# Patient Record
Sex: FEMALE | Race: WHITE | NOT HISPANIC OR LATINO | Employment: OTHER | URBAN - METROPOLITAN AREA
[De-identification: names, ages, dates, MRNs, and addresses within clinical notes are randomized per-mention and may not be internally consistent; named-entity substitution may affect disease eponyms.]

---

## 2022-06-17 ENCOUNTER — ANESTHESIA EVENT (OUTPATIENT)
Dept: EMERGENCY MEDICINE | Facility: HOSPITAL | Age: 57
DRG: 208 | End: 2022-06-17
Payer: MEDICARE

## 2022-06-17 ENCOUNTER — HOSPITAL ENCOUNTER (INPATIENT)
Facility: HOSPITAL | Age: 57
LOS: 8 days | Discharge: HOSPICE | DRG: 208 | End: 2022-06-25
Attending: EMERGENCY MEDICINE | Admitting: FAMILY MEDICINE
Payer: MEDICARE

## 2022-06-17 ENCOUNTER — ANESTHESIA (OUTPATIENT)
Dept: EMERGENCY MEDICINE | Facility: HOSPITAL | Age: 57
DRG: 208 | End: 2022-06-17
Payer: MEDICARE

## 2022-06-17 DIAGNOSIS — R06.00 DYSPNEA: ICD-10-CM

## 2022-06-17 DIAGNOSIS — J43.8 OTHER EMPHYSEMA: Chronic | ICD-10-CM

## 2022-06-17 DIAGNOSIS — R65.21 SEPTIC SHOCK: ICD-10-CM

## 2022-06-17 DIAGNOSIS — J18.9 PNEUMONIA OF RIGHT LUNG DUE TO INFECTIOUS ORGANISM, UNSPECIFIED PART OF LUNG: ICD-10-CM

## 2022-06-17 DIAGNOSIS — I47.20 VT (VENTRICULAR TACHYCARDIA): ICD-10-CM

## 2022-06-17 DIAGNOSIS — I46.9 CARDIAC ARREST: ICD-10-CM

## 2022-06-17 DIAGNOSIS — R07.9 CHEST PAIN: ICD-10-CM

## 2022-06-17 DIAGNOSIS — G47.33 OBSTRUCTIVE SLEEP APNEA SYNDROME: ICD-10-CM

## 2022-06-17 DIAGNOSIS — A41.9 SEPTIC SHOCK: ICD-10-CM

## 2022-06-17 DIAGNOSIS — I47.21: ICD-10-CM

## 2022-06-17 DIAGNOSIS — J96.02 ACUTE HYPERCAPNIC RESPIRATORY FAILURE: Primary | ICD-10-CM

## 2022-06-17 DIAGNOSIS — Z97.8 ENDOTRACHEALLY INTUBATED: ICD-10-CM

## 2022-06-17 DIAGNOSIS — J96.10 CHRONIC RESPIRATORY FAILURE: ICD-10-CM

## 2022-06-17 DIAGNOSIS — I47.20 VENTRICULAR TACHYCARDIA: ICD-10-CM

## 2022-06-17 DIAGNOSIS — E43 SEVERE MALNUTRITION: Chronic | ICD-10-CM

## 2022-06-17 DIAGNOSIS — J44.1 COPD EXACERBATION: ICD-10-CM

## 2022-06-17 DIAGNOSIS — R06.02 SOB (SHORTNESS OF BREATH): ICD-10-CM

## 2022-06-17 DIAGNOSIS — J44.0 CHRONIC OBSTRUCTIVE PULMONARY DISEASE WITH ACUTE LOWER RESPIRATORY INFECTION: Chronic | ICD-10-CM

## 2022-06-17 DIAGNOSIS — J96.10 CHRONIC RESPIRATORY FAILURE, UNSPECIFIED WHETHER WITH HYPOXIA OR HYPERCAPNIA: ICD-10-CM

## 2022-06-17 PROBLEM — R10.9 ABDOMINAL PAIN: Status: ACTIVE | Noted: 2022-03-07

## 2022-06-17 PROBLEM — F17.200 SMOKER: Status: ACTIVE | Noted: 2022-05-23

## 2022-06-17 PROBLEM — J43.2 CENTRIACINAR EMPHYSEMA: Status: ACTIVE | Noted: 2022-06-17

## 2022-06-17 LAB
ALBUMIN SERPL BCP-MCNC: 3.6 G/DL (ref 3.5–5.2)
ALP SERPL-CCNC: 52 U/L (ref 55–135)
ALT SERPL W/O P-5'-P-CCNC: 10 U/L (ref 10–44)
ANION GAP SERPL CALC-SCNC: 6 MMOL/L (ref 8–16)
AST SERPL-CCNC: 15 U/L (ref 10–40)
BASOPHILS # BLD AUTO: 0.03 K/UL (ref 0–0.2)
BASOPHILS NFR BLD: 0.2 % (ref 0–1.9)
BILIRUB SERPL-MCNC: 1 MG/DL (ref 0.1–1)
BNP SERPL-MCNC: 60 PG/ML (ref 0–99)
BUN SERPL-MCNC: 10 MG/DL (ref 6–20)
CALCIUM SERPL-MCNC: 8.5 MG/DL (ref 8.7–10.5)
CHLORIDE SERPL-SCNC: 80 MMOL/L (ref 95–110)
CO2 SERPL-SCNC: 48 MMOL/L (ref 23–29)
CREAT SERPL-MCNC: 0.3 MG/DL (ref 0.5–1.4)
DIFFERENTIAL METHOD: ABNORMAL
EOSINOPHIL # BLD AUTO: 0 K/UL (ref 0–0.5)
EOSINOPHIL NFR BLD: 0.2 % (ref 0–8)
ERYTHROCYTE [DISTWIDTH] IN BLOOD BY AUTOMATED COUNT: 15.4 % (ref 11.5–14.5)
EST. GFR  (AFRICAN AMERICAN): >60 ML/MIN/1.73 M^2
EST. GFR  (NON AFRICAN AMERICAN): >60 ML/MIN/1.73 M^2
GLUCOSE SERPL-MCNC: 134 MG/DL (ref 70–110)
GLUCOSE SERPL-MCNC: 82 MG/DL (ref 70–110)
GLUCOSE SERPL-MCNC: 86 MG/DL (ref 70–110)
HCT VFR BLD AUTO: 37.2 % (ref 37–48.5)
HGB BLD-MCNC: 11 G/DL (ref 12–16)
IMM GRANULOCYTES # BLD AUTO: 0.1 K/UL (ref 0–0.04)
IMM GRANULOCYTES NFR BLD AUTO: 0.6 % (ref 0–0.5)
INFLUENZA A, MOLECULAR: NEGATIVE
INFLUENZA B, MOLECULAR: NEGATIVE
LACTATE SERPL-SCNC: 0.8 MMOL/L (ref 0.5–1.9)
LYMPHOCYTES # BLD AUTO: 0.8 K/UL (ref 1–4.8)
LYMPHOCYTES NFR BLD: 4.4 % (ref 18–48)
MAGNESIUM SERPL-MCNC: 1.6 MG/DL (ref 1.6–2.6)
MCH RBC QN AUTO: 28.4 PG (ref 27–31)
MCHC RBC AUTO-ENTMCNC: 29.6 G/DL (ref 32–36)
MCV RBC AUTO: 96 FL (ref 82–98)
MONOCYTES # BLD AUTO: 0.6 K/UL (ref 0.3–1)
MONOCYTES NFR BLD: 3.6 % (ref 4–15)
NEUTROPHILS # BLD AUTO: 16.4 K/UL (ref 1.8–7.7)
NEUTROPHILS NFR BLD: 91 % (ref 38–73)
NRBC BLD-RTO: 0 /100 WBC
PLATELET # BLD AUTO: 210 K/UL (ref 150–450)
PMV BLD AUTO: 8.4 FL (ref 9.2–12.9)
POTASSIUM SERPL-SCNC: 4.3 MMOL/L (ref 3.5–5.1)
PROCALCITONIN SERPL IA-MCNC: 0.05 NG/ML (ref 0–0.5)
PROT SERPL-MCNC: 6.9 G/DL (ref 6–8.4)
RBC # BLD AUTO: 3.88 M/UL (ref 4–5.4)
SARS-COV-2 RDRP RESP QL NAA+PROBE: NEGATIVE
SODIUM SERPL-SCNC: 134 MMOL/L (ref 136–145)
SPECIMEN SOURCE: NORMAL
TROPONIN I SERPL DL<=0.01 NG/ML-MCNC: <0.03 NG/ML
WBC # BLD AUTO: 17.99 K/UL (ref 3.9–12.7)

## 2022-06-17 PROCEDURE — 87040 BLOOD CULTURE FOR BACTERIA: CPT | Performed by: EMERGENCY MEDICINE

## 2022-06-17 PROCEDURE — C1751 CATH, INF, PER/CENT/MIDLINE: HCPCS

## 2022-06-17 PROCEDURE — 25000242 PHARM REV CODE 250 ALT 637 W/ HCPCS: Performed by: EMERGENCY MEDICINE

## 2022-06-17 PROCEDURE — 83880 ASSAY OF NATRIURETIC PEPTIDE: CPT | Performed by: EMERGENCY MEDICINE

## 2022-06-17 PROCEDURE — 93005 ELECTROCARDIOGRAM TRACING: CPT | Performed by: SPECIALIST

## 2022-06-17 PROCEDURE — 99900031 HC PATIENT EDUCATION (STAT)

## 2022-06-17 PROCEDURE — 93010 EKG 12-LEAD: ICD-10-PCS | Mod: ,,, | Performed by: SPECIALIST

## 2022-06-17 PROCEDURE — 25000003 PHARM REV CODE 250: Performed by: EMERGENCY MEDICINE

## 2022-06-17 PROCEDURE — 99900035 HC TECH TIME PER 15 MIN (STAT)

## 2022-06-17 PROCEDURE — 36600 WITHDRAWAL OF ARTERIAL BLOOD: CPT

## 2022-06-17 PROCEDURE — 82962 GLUCOSE BLOOD TEST: CPT

## 2022-06-17 PROCEDURE — 94002 VENT MGMT INPAT INIT DAY: CPT

## 2022-06-17 PROCEDURE — 82803 BLOOD GASES ANY COMBINATION: CPT

## 2022-06-17 PROCEDURE — 96361 HYDRATE IV INFUSION ADD-ON: CPT

## 2022-06-17 PROCEDURE — 36415 COLL VENOUS BLD VENIPUNCTURE: CPT | Performed by: EMERGENCY MEDICINE

## 2022-06-17 PROCEDURE — 93010 ELECTROCARDIOGRAM REPORT: CPT | Mod: ,,, | Performed by: SPECIALIST

## 2022-06-17 PROCEDURE — 96365 THER/PROPH/DIAG IV INF INIT: CPT

## 2022-06-17 PROCEDURE — 25500020 PHARM REV CODE 255: Performed by: EMERGENCY MEDICINE

## 2022-06-17 PROCEDURE — 63600175 PHARM REV CODE 636 W HCPCS: Performed by: FAMILY MEDICINE

## 2022-06-17 PROCEDURE — 36620 INSERTION CATHETER ARTERY: CPT | Performed by: ANESTHESIOLOGY

## 2022-06-17 PROCEDURE — 94761 N-INVAS EAR/PLS OXIMETRY MLT: CPT

## 2022-06-17 PROCEDURE — 36556 INSERT NON-TUNNEL CV CATH: CPT

## 2022-06-17 PROCEDURE — 92950 HEART/LUNG RESUSCITATION CPR: CPT | Mod: 59

## 2022-06-17 PROCEDURE — 84145 PROCALCITONIN (PCT): CPT | Performed by: EMERGENCY MEDICINE

## 2022-06-17 PROCEDURE — 31500 INSERT EMERGENCY AIRWAY: CPT

## 2022-06-17 PROCEDURE — U0002 COVID-19 LAB TEST NON-CDC: HCPCS | Performed by: EMERGENCY MEDICINE

## 2022-06-17 PROCEDURE — 25000003 PHARM REV CODE 250: Performed by: FAMILY MEDICINE

## 2022-06-17 PROCEDURE — 99291 CRITICAL CARE FIRST HOUR: CPT

## 2022-06-17 PROCEDURE — 80053 COMPREHEN METABOLIC PANEL: CPT | Performed by: EMERGENCY MEDICINE

## 2022-06-17 PROCEDURE — 51702 INSERT TEMP BLADDER CATH: CPT

## 2022-06-17 PROCEDURE — 96367 TX/PROPH/DG ADDL SEQ IV INF: CPT

## 2022-06-17 PROCEDURE — 84484 ASSAY OF TROPONIN QUANT: CPT | Performed by: EMERGENCY MEDICINE

## 2022-06-17 PROCEDURE — 94644 CONT INHLJ TX 1ST HOUR: CPT

## 2022-06-17 PROCEDURE — 83735 ASSAY OF MAGNESIUM: CPT | Performed by: EMERGENCY MEDICINE

## 2022-06-17 PROCEDURE — 63600175 PHARM REV CODE 636 W HCPCS: Performed by: EMERGENCY MEDICINE

## 2022-06-17 PROCEDURE — 83605 ASSAY OF LACTIC ACID: CPT | Performed by: EMERGENCY MEDICINE

## 2022-06-17 PROCEDURE — 96376 TX/PRO/DX INJ SAME DRUG ADON: CPT

## 2022-06-17 PROCEDURE — 87502 INFLUENZA DNA AMP PROBE: CPT | Performed by: EMERGENCY MEDICINE

## 2022-06-17 PROCEDURE — 27000221 HC OXYGEN, UP TO 24 HOURS

## 2022-06-17 PROCEDURE — 96375 TX/PRO/DX INJ NEW DRUG ADDON: CPT

## 2022-06-17 PROCEDURE — 85025 COMPLETE CBC W/AUTO DIFF WBC: CPT | Performed by: EMERGENCY MEDICINE

## 2022-06-17 PROCEDURE — 96368 THER/DIAG CONCURRENT INF: CPT

## 2022-06-17 PROCEDURE — 20000000 HC ICU ROOM

## 2022-06-17 PROCEDURE — 96366 THER/PROPH/DIAG IV INF ADDON: CPT

## 2022-06-17 RX ORDER — LORAZEPAM 2 MG/ML
2 INJECTION INTRAMUSCULAR
Status: DISPENSED | OUTPATIENT
Start: 2022-06-17 | End: 2022-06-18

## 2022-06-17 RX ORDER — ALBUTEROL SULFATE 0.63 MG/3ML
0.63 SOLUTION RESPIRATORY (INHALATION) EVERY 6 HOURS PRN
COMMUNITY

## 2022-06-17 RX ORDER — FENTANYL CITRATE-0.9 % NACL/PF 10 MCG/ML
0-250 PLASTIC BAG, INJECTION (ML) INTRAVENOUS CONTINUOUS
Status: DISCONTINUED | OUTPATIENT
Start: 2022-06-17 | End: 2022-06-19

## 2022-06-17 RX ORDER — LATANOPROST 50 UG/ML
1 SOLUTION/ DROPS OPHTHALMIC NIGHTLY
Status: DISCONTINUED | OUTPATIENT
Start: 2022-06-17 | End: 2022-06-25 | Stop reason: HOSPADM

## 2022-06-17 RX ORDER — GLUCAGON 1 MG
1 KIT INJECTION
Status: DISCONTINUED | OUTPATIENT
Start: 2022-06-17 | End: 2022-06-25 | Stop reason: HOSPADM

## 2022-06-17 RX ORDER — MAGNESIUM SULFATE HEPTAHYDRATE 500 MG/ML
INJECTION, SOLUTION INTRAMUSCULAR; INTRAVENOUS CODE/TRAUMA/SEDATION MEDICATION
Status: COMPLETED | OUTPATIENT
Start: 2022-06-17 | End: 2022-06-17

## 2022-06-17 RX ORDER — IPRATROPIUM BROMIDE AND ALBUTEROL SULFATE 2.5; .5 MG/3ML; MG/3ML
3 SOLUTION RESPIRATORY (INHALATION)
Status: DISCONTINUED | OUTPATIENT
Start: 2022-06-18 | End: 2022-06-18

## 2022-06-17 RX ORDER — ALPRAZOLAM 0.25 MG/1
0.25 TABLET ORAL 3 TIMES DAILY
Status: DISCONTINUED | OUTPATIENT
Start: 2022-06-18 | End: 2022-06-18

## 2022-06-17 RX ORDER — SODIUM BICARBONATE 1 MEQ/ML
SYRINGE (ML) INTRAVENOUS CODE/TRAUMA/SEDATION MEDICATION
Status: COMPLETED | OUTPATIENT
Start: 2022-06-17 | End: 2022-06-17

## 2022-06-17 RX ORDER — LANOLIN ALCOHOL/MO/W.PET/CERES
800 CREAM (GRAM) TOPICAL
Status: DISCONTINUED | OUTPATIENT
Start: 2022-06-17 | End: 2022-06-25 | Stop reason: HOSPADM

## 2022-06-17 RX ORDER — PANTOPRAZOLE SODIUM 40 MG/1
40 TABLET, DELAYED RELEASE ORAL DAILY
COMMUNITY

## 2022-06-17 RX ORDER — AZITHROMYCIN 250 MG/1
500 TABLET, FILM COATED ORAL DAILY
COMMUNITY

## 2022-06-17 RX ORDER — MORPHINE SULFATE 2 MG/ML
2 INJECTION, SOLUTION INTRAMUSCULAR; INTRAVENOUS EVERY 4 HOURS PRN
Status: DISCONTINUED | OUTPATIENT
Start: 2022-06-17 | End: 2022-06-19

## 2022-06-17 RX ORDER — LIDOCAINE HYDROCHLORIDE 10 MG/ML
5 INJECTION, SOLUTION EPIDURAL; INFILTRATION; INTRACAUDAL; PERINEURAL
Status: COMPLETED | OUTPATIENT
Start: 2022-06-17 | End: 2022-06-17

## 2022-06-17 RX ORDER — IBUPROFEN 200 MG
16 TABLET ORAL
Status: DISCONTINUED | OUTPATIENT
Start: 2022-06-17 | End: 2022-06-25 | Stop reason: HOSPADM

## 2022-06-17 RX ORDER — SODIUM CHLORIDE 0.9 % (FLUSH) 0.9 %
10 SYRINGE (ML) INJECTION EVERY 12 HOURS PRN
Status: DISCONTINUED | OUTPATIENT
Start: 2022-06-17 | End: 2022-06-25 | Stop reason: HOSPADM

## 2022-06-17 RX ORDER — PROPOFOL 10 MG/ML
0-50 INJECTION, EMULSION INTRAVENOUS CONTINUOUS
Status: DISCONTINUED | OUTPATIENT
Start: 2022-06-17 | End: 2022-06-18

## 2022-06-17 RX ORDER — GUAIFENESIN 600 MG/1
1200 TABLET, EXTENDED RELEASE ORAL DAILY
COMMUNITY

## 2022-06-17 RX ORDER — IBUPROFEN 200 MG
24 TABLET ORAL
Status: DISCONTINUED | OUTPATIENT
Start: 2022-06-17 | End: 2022-06-25 | Stop reason: HOSPADM

## 2022-06-17 RX ORDER — NOREPINEPHRINE BITARTRATE/D5W 4MG/250ML
0-3 PLASTIC BAG, INJECTION (ML) INTRAVENOUS CONTINUOUS
Status: DISCONTINUED | OUTPATIENT
Start: 2022-06-17 | End: 2022-06-18

## 2022-06-17 RX ORDER — POLYETHYLENE GLYCOL 3350 17 G/17G
17 POWDER, FOR SOLUTION ORAL 2 TIMES DAILY PRN
Status: DISCONTINUED | OUTPATIENT
Start: 2022-06-17 | End: 2022-06-25 | Stop reason: HOSPADM

## 2022-06-17 RX ORDER — AMIODARONE HYDROCHLORIDE 150 MG/3ML
INJECTION, SOLUTION INTRAVENOUS CODE/TRAUMA/SEDATION MEDICATION
Status: COMPLETED | OUTPATIENT
Start: 2022-06-17 | End: 2022-06-17

## 2022-06-17 RX ORDER — ALBUTEROL SULFATE 0.83 MG/ML
7.5 SOLUTION RESPIRATORY (INHALATION)
Status: COMPLETED | OUTPATIENT
Start: 2022-06-17 | End: 2022-06-17

## 2022-06-17 RX ORDER — EPINEPHRINE 0.1 MG/ML
INJECTION INTRAVENOUS CODE/TRAUMA/SEDATION MEDICATION
Status: COMPLETED | OUTPATIENT
Start: 2022-06-17 | End: 2022-06-17

## 2022-06-17 RX ORDER — LATANOPROST/PF 0.005 %
DROPS OPHTHALMIC (EYE)
COMMUNITY

## 2022-06-17 RX ORDER — MORPHINE SULFATE 4 MG/ML
4 INJECTION, SOLUTION INTRAMUSCULAR; INTRAVENOUS ONCE
Status: COMPLETED | OUTPATIENT
Start: 2022-06-17 | End: 2022-06-17

## 2022-06-17 RX ORDER — ONDANSETRON 2 MG/ML
4 INJECTION INTRAMUSCULAR; INTRAVENOUS EVERY 12 HOURS PRN
Status: DISCONTINUED | OUTPATIENT
Start: 2022-06-17 | End: 2022-06-19

## 2022-06-17 RX ORDER — IBUPROFEN 200 MG
1 TABLET ORAL
Status: DISCONTINUED | OUTPATIENT
Start: 2022-06-17 | End: 2022-06-18

## 2022-06-17 RX ORDER — METHYLPREDNISOLONE SOD SUCC 125 MG
125 VIAL (EA) INJECTION
Status: COMPLETED | OUTPATIENT
Start: 2022-06-17 | End: 2022-06-17

## 2022-06-17 RX ORDER — MORPHINE SULFATE 4 MG/ML
4 INJECTION, SOLUTION INTRAMUSCULAR; INTRAVENOUS EVERY 4 HOURS PRN
Status: DISCONTINUED | OUTPATIENT
Start: 2022-06-17 | End: 2022-06-19

## 2022-06-17 RX ORDER — MEROPENEM AND SODIUM CHLORIDE 1 G/50ML
1 INJECTION, SOLUTION INTRAVENOUS
Status: DISCONTINUED | OUTPATIENT
Start: 2022-06-18 | End: 2022-06-23

## 2022-06-17 RX ORDER — ONDANSETRON 2 MG/ML
4 INJECTION INTRAMUSCULAR; INTRAVENOUS
Status: COMPLETED | OUTPATIENT
Start: 2022-06-17 | End: 2022-06-17

## 2022-06-17 RX ORDER — FLUTICASONE FUROATE 200 UG/1
200 POWDER RESPIRATORY (INHALATION)
COMMUNITY

## 2022-06-17 RX ORDER — ACETAMINOPHEN 500 MG
500 TABLET ORAL EVERY 6 HOURS PRN
Status: ON HOLD | COMMUNITY
End: 2022-06-25 | Stop reason: HOSPADM

## 2022-06-17 RX ORDER — SODIUM CHLORIDE, SODIUM LACTATE, POTASSIUM CHLORIDE, CALCIUM CHLORIDE 600; 310; 30; 20 MG/100ML; MG/100ML; MG/100ML; MG/100ML
INJECTION, SOLUTION INTRAVENOUS CONTINUOUS
Status: DISCONTINUED | OUTPATIENT
Start: 2022-06-17 | End: 2022-06-18

## 2022-06-17 RX ORDER — MAGNESIUM SULFATE HEPTAHYDRATE 40 MG/ML
2 INJECTION, SOLUTION INTRAVENOUS ONCE
Status: COMPLETED | OUTPATIENT
Start: 2022-06-17 | End: 2022-06-17

## 2022-06-17 RX ORDER — ENOXAPARIN SODIUM 100 MG/ML
40 INJECTION SUBCUTANEOUS EVERY 24 HOURS
Status: DISCONTINUED | OUTPATIENT
Start: 2022-06-17 | End: 2022-06-25 | Stop reason: HOSPADM

## 2022-06-17 RX ORDER — IBUPROFEN 200 MG
1 TABLET ORAL
COMMUNITY

## 2022-06-17 RX ORDER — NEOMYCIN/POLYMYXIN B/HYDROCORT 3.5-10K-1
1 SUSPENSION, DROPS(FINAL DOSAGE FORM)(ML) OPHTHALMIC (EYE) EVERY 4 HOURS
COMMUNITY

## 2022-06-17 RX ORDER — ACETAMINOPHEN 325 MG/1
650 TABLET ORAL EVERY 8 HOURS PRN
Status: DISCONTINUED | OUTPATIENT
Start: 2022-06-17 | End: 2022-06-25 | Stop reason: HOSPADM

## 2022-06-17 RX ORDER — NALOXONE HCL 0.4 MG/ML
0.02 VIAL (ML) INJECTION
Status: DISCONTINUED | OUTPATIENT
Start: 2022-06-17 | End: 2022-06-25 | Stop reason: HOSPADM

## 2022-06-17 RX ORDER — MEROPENEM AND SODIUM CHLORIDE 1 G/50ML
1 INJECTION, SOLUTION INTRAVENOUS
Status: DISCONTINUED | OUTPATIENT
Start: 2022-06-17 | End: 2022-06-17

## 2022-06-17 RX ORDER — SODIUM CHLORIDE 0.9 % (FLUSH) 0.9 %
3 SYRINGE (ML) INJECTION
Status: DISCONTINUED | OUTPATIENT
Start: 2022-06-17 | End: 2022-06-25 | Stop reason: HOSPADM

## 2022-06-17 RX ORDER — GUAIFENESIN 600 MG/1
1200 TABLET, EXTENDED RELEASE ORAL DAILY
Status: DISCONTINUED | OUTPATIENT
Start: 2022-06-18 | End: 2022-06-25 | Stop reason: HOSPADM

## 2022-06-17 RX ORDER — AZELASTINE 1 MG/ML
1 SPRAY, METERED NASAL 2 TIMES DAILY
COMMUNITY

## 2022-06-17 RX ORDER — PROCHLORPERAZINE EDISYLATE 5 MG/ML
5 INJECTION INTRAMUSCULAR; INTRAVENOUS EVERY 6 HOURS PRN
Status: DISCONTINUED | OUTPATIENT
Start: 2022-06-17 | End: 2022-06-25 | Stop reason: HOSPADM

## 2022-06-17 RX ORDER — FENTANYL CITRATE 50 UG/ML
25 INJECTION, SOLUTION INTRAMUSCULAR; INTRAVENOUS
Status: COMPLETED | OUTPATIENT
Start: 2022-06-17 | End: 2022-06-17

## 2022-06-17 RX ORDER — ALPRAZOLAM 0.25 MG/1
TABLET ORAL 3 TIMES DAILY
COMMUNITY

## 2022-06-17 RX ORDER — IPRATROPIUM BROMIDE 0.5 MG/2.5ML
0.5 SOLUTION RESPIRATORY (INHALATION)
Status: COMPLETED | OUTPATIENT
Start: 2022-06-17 | End: 2022-06-17

## 2022-06-17 RX ADMIN — CEFTRIAXONE 1 G: 1 INJECTION, SOLUTION INTRAVENOUS at 04:06

## 2022-06-17 RX ADMIN — SODIUM CHLORIDE, SODIUM LACTATE, POTASSIUM CHLORIDE, AND CALCIUM CHLORIDE 1000 ML: .6; .31; .03; .02 INJECTION, SOLUTION INTRAVENOUS at 07:06

## 2022-06-17 RX ADMIN — IOHEXOL 100 ML: 350 INJECTION, SOLUTION INTRAVENOUS at 08:06

## 2022-06-17 RX ADMIN — AMIODARONE HYDROCHLORIDE 300 MG: 50 INJECTION, SOLUTION INTRAVENOUS at 05:06

## 2022-06-17 RX ADMIN — MORPHINE SULFATE 4 MG: 4 INJECTION, SOLUTION INTRAMUSCULAR; INTRAVENOUS at 02:06

## 2022-06-17 RX ADMIN — MEROPENEM AND SODIUM CHLORIDE 1 G: 1 INJECTION, SOLUTION INTRAVENOUS at 09:06

## 2022-06-17 RX ADMIN — FENTANYL CITRATE 25 MCG: 50 INJECTION, SOLUTION INTRAMUSCULAR; INTRAVENOUS at 07:06

## 2022-06-17 RX ADMIN — SODIUM CHLORIDE, SODIUM LACTATE, POTASSIUM CHLORIDE, AND CALCIUM CHLORIDE 1000 ML: .6; .31; .03; .02 INJECTION, SOLUTION INTRAVENOUS at 05:06

## 2022-06-17 RX ADMIN — Medication 0.35 MCG/KG/MIN: at 11:06

## 2022-06-17 RX ADMIN — FENTANYL CITRATE 25 MCG/HR: 50 INJECTION INTRAVENOUS at 09:06

## 2022-06-17 RX ADMIN — ALBUTEROL SULFATE 7.5 MG: 2.5 SOLUTION RESPIRATORY (INHALATION) at 11:06

## 2022-06-17 RX ADMIN — Medication 0.01 MCG/KG/MIN: at 06:06

## 2022-06-17 RX ADMIN — SODIUM CHLORIDE, SODIUM LACTATE, POTASSIUM CHLORIDE, AND CALCIUM CHLORIDE 1000 ML: .6; .31; .03; .02 INJECTION, SOLUTION INTRAVENOUS at 11:06

## 2022-06-17 RX ADMIN — LIDOCAINE HYDROCHLORIDE 50 MG: 10 INJECTION, SOLUTION EPIDURAL; INFILTRATION; INTRACAUDAL; PERINEURAL at 07:06

## 2022-06-17 RX ADMIN — SODIUM CHLORIDE, SODIUM LACTATE, POTASSIUM CHLORIDE, AND CALCIUM CHLORIDE: .6; .31; .03; .02 INJECTION, SOLUTION INTRAVENOUS at 11:06

## 2022-06-17 RX ADMIN — MAGNESIUM SULFATE IN WATER 2 G: 40 INJECTION, SOLUTION INTRAVENOUS at 02:06

## 2022-06-17 RX ADMIN — MAGNESIUM SULFATE HEPTAHYDRATE 2 G: 500 INJECTION, SOLUTION INTRAMUSCULAR; INTRAVENOUS at 05:06

## 2022-06-17 RX ADMIN — METHYLPREDNISOLONE SODIUM SUCCINATE 125 MG: 125 INJECTION, POWDER, FOR SOLUTION INTRAMUSCULAR; INTRAVENOUS at 01:06

## 2022-06-17 RX ADMIN — EPINEPHRINE 0.1 MG: 0.1 INJECTION, SOLUTION ENDOTRACHEAL; INTRACARDIAC; INTRAVENOUS at 05:06

## 2022-06-17 RX ADMIN — AZITHROMYCIN 500 MG: 500 INJECTION, POWDER, LYOPHILIZED, FOR SOLUTION INTRAVENOUS at 04:06

## 2022-06-17 RX ADMIN — ONDANSETRON 4 MG: 2 INJECTION INTRAMUSCULAR; INTRAVENOUS at 02:06

## 2022-06-17 RX ADMIN — PROPOFOL 5 MCG/KG/MIN: 10 INJECTION, EMULSION INTRAVENOUS at 04:06

## 2022-06-17 RX ADMIN — SODIUM BICARBONATE 100 MEQ: 84 INJECTION, SOLUTION INTRAVENOUS at 05:06

## 2022-06-17 RX ADMIN — IPRATROPIUM BROMIDE 0.5 MG: 0.5 SOLUTION RESPIRATORY (INHALATION) at 11:06

## 2022-06-17 NOTE — ED PROVIDER NOTES
Encounter Date: 6/17/2022       History     Chief Complaint   Patient presents with    Cough    Shortness of Breath     Cough and SOB x 3 days      56-year-old female past medical history of COPD, malnutrition, sepsis, asthma, who presents emergency department with shortness of breath.  Patient normally wears oxygen 3 L and was reported that her oxygen was low 80s.  Patient has had a cough of occasionally productive sputum.  She has had diarrhea chronically unchanged from baseline.  No fever chills reported.  No one else sick at home with similar symptoms.  No chest pain.  She has chronic abdominal pain from bowel resection from an episode when she severe sepsis.  She is complaining of some upper abdominal pain mild.  Patient has been using her breathing treatments and inhalers at home but still feels short of breath.  Patient recently relocated here from [place].        Review of patient's allergies indicates:   Allergen Reactions    Celexa [citalopram]     Breztri aerosphere [budesonide-glycopyr-formoterol]     Pcn [penicillins]     Shellfish containing products     Advair diskus [fluticasone propion-salmeterol] Rash    Buspirone (bulk) Rash    Chantix [varenicline] Nausea And Vomiting     Past Medical History:   Diagnosis Date    Anemia, unspecified     Asthma     Chronic respiratory failure with hypoxia     COPD (chronic obstructive pulmonary disease)     Dysuria     Hypokalemia     Hypomagnesemia     Hyponatremia     Malnutrition     Sepsis, unspecified organism      Past Surgical History:   Procedure Laterality Date    ABDOMINAL SURGERY      CHOLECYSTECTOMY      COLECTOMY      12/2021    HYSTERECTOMY       History reviewed. No pertinent family history.  Social History     Tobacco Use    Smoking status: Current Every Day Smoker     Packs/day: 0.50    Smokeless tobacco: Never Used   Substance Use Topics    Alcohol use: Not Currently    Drug use: Yes     Types: Marijuana     Comment: x  5 days ago     Review of Systems   Constitutional: Negative for fever.   HENT: Negative for sore throat.    Respiratory: Positive for cough and shortness of breath.    Cardiovascular: Positive for leg swelling. Negative for chest pain.   Gastrointestinal: Positive for diarrhea. Negative for nausea and vomiting.   Genitourinary: Negative for dysuria.   Musculoskeletal: Negative for back pain.   Skin: Negative for rash.   Neurological: Negative for weakness and headaches.   Hematological: Does not bruise/bleed easily.   All other systems reviewed and are negative.      Physical Exam     Initial Vitals [06/17/22 1100]   BP Pulse Resp Temp SpO2   119/82 (!) 118 20 98.7 °F (37.1 °C) 100 %      MAP       --         Physical Exam    Nursing note and vitals reviewed.  Constitutional: No distress.   Frail, cachectic   HENT:   Head: Normocephalic and atraumatic.   Mouth/Throat: No oropharyngeal exudate.   Eyes: Conjunctivae and EOM are normal. Pupils are equal, round, and reactive to light.   Neck: Neck supple. No tracheal deviation present.   Cardiovascular: Regular rhythm, normal heart sounds and intact distal pulses.   No murmur heard.  Tachycardic,   Pulmonary/Chest: No stridor. No respiratory distress. She has wheezes (Diffuse expiratory bilaterally). She has no rhonchi. She has no rales.   Decreased air entry   Abdominal: Abdomen is soft. She exhibits no distension. There is abdominal tenderness ( minimal epigastric). There is no rebound.   Musculoskeletal:         General: No tenderness or edema. Normal range of motion.      Cervical back: Neck supple.     Neurological: She is alert and oriented to person, place, and time. She has normal strength. No cranial nerve deficit or sensory deficit.   Skin: Skin is warm and dry. Capillary refill takes less than 2 seconds. No rash noted. No erythema. No pallor.   Psychiatric: She has a normal mood and affect. Thought content normal.         ED Course   Critical  "Care    Date/Time: 6/17/2022 5:07 PM  Performed by: Shayne Hoffman DO  Authorized by: Shayne Hoffman DO   Direct patient critical care time: 15 minutes  Additional history critical care time: 5 minutes  Ordering / reviewing critical care time: 5 minutes  Documentation critical care time: 5 minutes  Consulting other physicians critical care time: 5 minutes  Total critical care time (exclusive of procedural time) : 35 minutes  Critical care time was exclusive of separately billable procedures and treating other patients.  Critical care was necessary to treat or prevent imminent or life-threatening deterioration of the following conditions: respiratory failure.  Critical care was time spent personally by me on the following activities: blood draw for specimens, discussions with consultants, discussions with primary provider, interpretation of cardiac output measurements, evaluation of patient's response to treatment, examination of patient, obtaining history from patient or surrogate, ordering and performing treatments and interventions, ordering and review of laboratory studies, ordering and review of radiographic studies, review of old charts, transcutaneous pacing, re-evaluation of patient's condition and vascular access procedures.  Subsequent provider of critical care: I assumed direction of critical care for this patient from another provider of my specialty.    Intubation    Date/Time: 6/17/2022 5:09 PM  Location procedure was performed: [facility] EMERGENCY DEPARTMENT  Performed by: Shayne Hoffman DO  Authorized by: Shayne Hoffman DO   Consent Done: Yes  Consent: Verbal consent obtained.  Risks and benefits: risks, benefits and alternatives were discussed  Consent given by: hari  Patient identity confirmed: name  Time out: Immediately prior to procedure a "time out" was called to verify the correct patient, procedure, equipment, support staff and site/side marked as required.  Indications: respiratory failure " and  hypercapnia  Intubation method: video-assisted  Patient status: paralyzed (RSI)  Preoxygenation: BVM  Paralytic: none  Laryngoscope size: Glide 3  Tube size: 7.5 mm  Tube type: cuffed  Number of attempts: 1  Cricoid pressure: no  Cords visualized: yes  Post-procedure assessment: ETCO2 monitor  Breath sounds: wheezing  Cuff inflated: yes  ETT to lip: 23 cm  Tube secured with: ETT christian  Chest x-ray interpreted by me.  Chest x-ray findings: endotracheal tube in appropriate position  Patient tolerance: Patient tolerated the procedure well with no immediate complications  Specimens: No    Central Line    Date/Time: 6/17/2022 7:14 PM  Performed by: Bernadette Campbell MD  Authorized by: Bernadette Campbell MD     Location procedure was performed:  [facility] EMERGENCY DEPARTMENT  Indications:  Hemodynamic monitoring, vascular access and med administration  Anesthesia:  Local infiltration  Local anesthetic:  Lidocaine 1% without epinephrine  Preparation:  Skin prepped with ChloraPrep  Location:  Left internal jugular  Catheter size:  12 Fr  Manometry: No    Number of attempts:  1  Critical Care    Date/Time: 6/17/2022 7:36 PM  Performed by: Bernadette Campbell MD  Authorized by: Juan M Godwin MD   Direct patient critical care time: 20 minutes  Ordering / reviewing critical care time: 5 minutes  Documentation critical care time: 5 minutes  Consulting other physicians critical care time: 5 minutes  Total critical care time (exclusive of procedural time) : 35 minutes        Labs Reviewed   CBC W/ AUTO DIFFERENTIAL - Abnormal; Notable for the following components:       Result Value    WBC 17.99 (*)     RBC 3.88 (*)     Hemoglobin 11.0 (*)     MCHC 29.6 (*)     RDW 15.4 (*)     MPV 8.4 (*)     Immature Granulocytes 0.6 (*)     Gran # (ANC) 16.4 (*)     Immature Grans (Abs) 0.10 (*)     Lymph # 0.8 (*)     Gran % 91.0 (*)     Lymph % 4.4 (*)     Mono % 3.6 (*)     All other components within normal limits   COMPREHENSIVE METABOLIC PANEL - Abnormal;  Notable for the following components:    Sodium 134 (*)     Chloride 80 (*)     CO2 48 (*)     Creatinine 0.3 (*)     Calcium 8.5 (*)     Alkaline Phosphatase 52 (*)     Anion Gap 6 (*)     All other components within normal limits    Narrative:     CO2 critical result(s) repeated. Called and verbal readback obtained   from Kanika Fox RN/ED by SORAIDA 06/17/2022 14:44   CULTURE, BLOOD   CULTURE, BLOOD   LACTIC ACID, PLASMA   TROPONIN I   MAGNESIUM   SARS-COV-2 RNA AMPLIFICATION, QUAL   INFLUENZA A AND B ANTIGEN    Narrative:     Specimen Source->Nasopharyngeal Swab   PROCALCITONIN   B-TYPE NATRIURETIC PEPTIDE   B-TYPE NATRIURETIC PEPTIDE   POCT GLUCOSE     Results for orders placed or performed during the hospital encounter of 06/17/22   CBC auto differential   Result Value Ref Range    WBC 17.99 (H) 3.90 - 12.70 K/uL    RBC 3.88 (L) 4.00 - 5.40 M/uL    Hemoglobin 11.0 (L) 12.0 - 16.0 g/dL    Hematocrit 37.2 37.0 - 48.5 %    MCV 96 82 - 98 fL    MCH 28.4 27.0 - 31.0 pg    MCHC 29.6 (L) 32.0 - 36.0 g/dL    RDW 15.4 (H) 11.5 - 14.5 %    Platelets 210 150 - 450 K/uL    MPV 8.4 (L) 9.2 - 12.9 fL    Immature Granulocytes 0.6 (H) 0.0 - 0.5 %    Gran # (ANC) 16.4 (H) 1.8 - 7.7 K/uL    Immature Grans (Abs) 0.10 (H) 0.00 - 0.04 K/uL    Lymph # 0.8 (L) 1.0 - 4.8 K/uL    Mono # 0.6 0.3 - 1.0 K/uL    Eos # 0.0 0.0 - 0.5 K/uL    Baso # 0.03 0.00 - 0.20 K/uL    nRBC 0 0 /100 WBC    Gran % 91.0 (H) 38.0 - 73.0 %    Lymph % 4.4 (L) 18.0 - 48.0 %    Mono % 3.6 (L) 4.0 - 15.0 %    Eosinophil % 0.2 0.0 - 8.0 %    Basophil % 0.2 0.0 - 1.9 %    Differential Method Automated    Comprehensive metabolic panel   Result Value Ref Range    Sodium 134 (L) 136 - 145 mmol/L    Potassium 4.3 3.5 - 5.1 mmol/L    Chloride 80 (L) 95 - 110 mmol/L    CO2 48 (HH) 23 - 29 mmol/L    Glucose 82 70 - 110 mg/dL    BUN 10 6 - 20 mg/dL    Creatinine 0.3 (L) 0.5 - 1.4 mg/dL    Calcium 8.5 (L) 8.7 - 10.5 mg/dL    Total Protein 6.9 6.0 - 8.4 g/dL    Albumin 3.6 3.5  - 5.2 g/dL    Total Bilirubin 1.0 0.1 - 1.0 mg/dL    Alkaline Phosphatase 52 (L) 55 - 135 U/L    AST 15 10 - 40 U/L    ALT 10 10 - 44 U/L    Anion Gap 6 (L) 8 - 16 mmol/L    eGFR if African American >60.0 >60 mL/min/1.73 m^2    eGFR if non African American >60.0 >60 mL/min/1.73 m^2   Lactic acid, plasma   Result Value Ref Range    Lactate (Lactic Acid) 0.8 0.5 - 1.9 mmol/L   Troponin I   Result Value Ref Range    Troponin I <0.030 <=0.040 ng/mL   Magnesium   Result Value Ref Range    Magnesium 1.6 1.6 - 2.6 mg/dL   COVID-19 Rapid Screening   Result Value Ref Range    SARS-CoV-2 RNA, Amplification, Qual Negative Negative   Influenza antigen Nasopharyngeal Swab   Result Value Ref Range    Influenza A, Molecular Negative Negative    Influenza B, Molecular Negative Negative    Flu A & B Source Nasal swab    Procalcitonin   Result Value Ref Range    Procalcitonin 0.05 0.00 - 0.50 ng/mL   BNP   Result Value Ref Range    BNP 60 0 - 99 pg/mL   POCT glucose   Result Value Ref Range    POC Glucose 86 70 - 110     X-Ray Chest AP Portable   Final Result      X-Ray Chest AP Portable   Final Result      X-Ray Chest 1 View   Final Result      CT Abdomen Pelvis With Contrast    (Results Pending)   CTA Chest Non-Coronary(PE Studies)    (Results Pending)   CT Head Without Contrast    (Results Pending)       EKG Readings: (Independently Interpreted)   Initial Reading: No STEMI. Rhythm: Sinus Tachycardia. Ectopy: Multifocal PVCs. Conduction: Normal.     ECG Results          EKG 12-lead (In process)  Result time 06/17/22 13:26:40    In process by Interface, Lab In [facility] (06/17/22 13:26:40)                 Narrative:    Test Reason : R06.00,    Vent. Rate : 121 BPM     Atrial Rate : 187 BPM     P-R Int : 130 ms          QRS Dur : 080 ms      QT Int : 322 ms       P-R-T Axes : 065 023 075 degrees     QTc Int : 457 ms    Sinus tachycardia with frequent Premature ventricular complexes  Possible Left atrial enlargement  Low voltage  QRS  Borderline Abnormal ECG  No previous ECGs available    Referred By: [email or web address]   SELF           Confirmed By:                             Imaging Results          X-Ray Chest AP Portable (Final result)  Result time 06/17/22 19:06:39    Final result by Isaias Lance MD (06/17/22 19:06:39)                 Narrative:    CLINICAL HISTORY:  56 years (1965) Female Cough; Shortness of Breath    TECHNIQUE:  Portable AP radiograph the chest.    COMPARISON:  Radiograph from June 17, 2022.    FINDINGS:  The lungs are hyperinflated with flattening of the bilateral hemidiaphragms consistent with underlying chronic obstructive lung disease. There is a tiny interstitial opacity in the right upper lobe, unchanged from the previous exam. There is minimal interstitial opacity at the right lung base. No pneumothorax is identified. The heart is normal in size. Atheromatous calcifications are seen at the aortic arch. Osseous structures appear unchanged. The visualized upper abdomen is unremarkable.    Lines and tubes: Interval placement of a right-sided IJ central venous catheter with tip at the level of the SVC. There is an endotracheal tube with tip approximately 4.8; from the shauna. There is an enteric tube with tip below the field of view (sub-diaphragmatic).    IMPRESSION:  Interval placement of a right-sided IJ central venous catheter with tip at the level of the SVC, otherwise unchanged radiograph the chest.                  .            Electronically signed by:  Isaias Lance MD  6/17/2022 7:06 PM CDT Workstation: [id number]                             X-Ray Chest AP Portable (Final result)  Result time 06/17/22 17:58:18   Procedure changed from X-Ray Chest 1 View     Final result by Laurel Arana MD (06/17/22 17:58:18)                 Narrative:    Portable chest x-ray at 5:43 PM is compared to prior study of 11:24 AM    Clinical history is intubation, shortness of breath    There is an endotracheal  tube with the tip approximately four cm above the shauna. There is an NG tube descending into the stomach.    The lungs are hyperexpanded compatible with COPD. There are scattered reticular lung markings in the right upper lobe and infrahilar region. There are no new infiltrates or pleural effusions. There is no pneumothorax.    The cardiomediastinal silhouette is normal in size.    IMPRESSION: Support devices in satisfactory position    Hyperexpanded lungs suggestive of COPD    Stable reticular opacities in the right upper lobe and right infrahilar region suggestive of infectious or inflammatory process    Electronically signed by:  Laurel Arana MD  6/17/2022 5:58 PM CDT Workstation: [id number]                             CT Abdomen Pelvis With Contrast (No Result on File)                X-Ray Chest 1 View (Final result)  Result time 06/17/22 11:31:20    Final result by Isaias Lance MD (06/17/22 11:31:20)                 Narrative:    CLINICAL HISTORY:  56 years (1965) Female Cough; Shortness of Breath (Cough and SOB x 3 days ); Hx of COPD    TECHNIQUE:  Portable AP radiograph the chest.    COMPARISON:  None available.    FINDINGS:  Mild scattered reticular lung markings are seen in the right upper lung zone and right infrahilar lung zone. No pneumothorax is identified. The heart is normal in size. The mediastinum is within normal limits. Osseous structures appear within normal limits. The visualized upper abdomen is unremarkable.    IMPRESSION:  Findings compatible with mild atypical infection/viral pneumonia. Consider radiographic follow-up in 6-8 weeks after treatment to document resolution (as radiographic findings may persist beyond clinical symptoms and underlying malignancy is not excluded).                  .            Electronically signed by:  Isaias Lance MD  6/17/2022 11:31 AM CDT Workstation: [id number]                            X-Rays:   Independently Interpreted Readings:    Other Readings:  Chest x-ray shows normal placement of right IJ catheter    Medications   iohexoL (OMNIPAQUE 350) injection 100 mL (has no administration in time range)   propofol (DIPRIVAN) 10 mg/mL infusion (25 mcg/kg/min × 39 kg Intravenous Rate/Dose Change 6/17/22 1720)   meropenem-0.9% sodium chloride 1 g/50 mL IVPB (has no administration in time range)   lactated ringers bolus 1,000 mL (1,000 mLs Intravenous New Bag 6/17/22 1907)   NORepinephrine 4 mg in dextrose 5% 250 mL infusion (premix) (titrating) (0.005 mcg/kg/min × 39 kg Intravenous New Bag 6/17/22 1833)   lorazepam injection 2 mg (has no administration in time range)   LIDOcaine (PF) 10 mg/ml (1%) injection 50 mg (has no administration in time range)   EPINEPHrine 0.1 mg/mL injection (0.1 mg Intravenous Given 6/17/22 1727)   amiodarone injection (300 mg Intravenous Given 6/17/22 1726)   albuterol nebulizer solution 7.5 mg (7.5 mg Nebulization Given 6/17/22 1130)   ipratropium 0.02 % nebulizer solution 0.5 mg (0.5 mg Nebulization Given 6/17/22 1130)   methylPREDNISolone sodium succinate injection 125 mg (125 mg Intravenous Given 6/17/22 1332)   magnesium sulfate 2g in water 50mL IVPB (premix) (0 g Intravenous Stopped 6/17/22 1642)   morphine injection 4 mg (4 mg Intravenous Given 6/17/22 1436)   ondansetron injection 4 mg (4 mg Intravenous Given 6/17/22 1435)   cefTRIAXone (ROCEPHIN) 1 g/50 mL D5W IVPB (0 g Intravenous Stopped 6/17/22 1648)   azithromycin 500 mg in dextrose 5 % 250 mL IVPB (ready to mix system) (0 mg Intravenous Stopped 6/17/22 1720)   lactated ringers bolus 1,000 mL (0 mLs Intravenous Stopped 6/17/22 1815)     Medical Decision Making:   Differential Diagnosis:   56-year-old female presented emergency department with shortness of breath.  Patient was turned over to me after she was intubated shift change waiting for CT scans and as I was walking into the room nurse called saying patient lost her pulse and CPR immediately started.   Patient had a run of torsades and ventricular tachycardia noted during CPR.  ET tube in place.  Breath sounds diminished bilaterally during the COVID however able to ventilate with Ambu bag.  Patient did respond to medication and pressor started as patient's blood pressure on the lower side.  Patient gradually woke up and now following commands and responding to family members appropriately and moving all extremities.  CT still pending and will be done on the way to the floor.  Hospital Medicine consulted who evaluated patient at bedside.  Anesthesia placed arterial line.  Pulmonologist on-call consulted and will follow-up patient.  Family counseled about patient's condition and poor prognosis  Clinical Tests:   Lab Tests: Reviewed  Radiological Study: Reviewed  Medical Tests: Reviewed             ED Course as of 06/17/22 1935 Fri Jun 17, 2022   1331 EKG 12:39 p.m. sinus tachycardia rate of 121, frequent premature ventricular complexes in a pattern of bigeminy followed by a normal sinus rhythm sinus tachycardia.  No STEMI. [JR]   1526 BMP resulted with CO2 of 48. Because of this I ordered a blood gas.  Blood gas with markedly elevated CO2 pH is 7.18.  Patient likely has acute on chronic hypercapnic respiratory failure.  Will place BiPAP.  Patient is protecting her airway and is still conversant.  [JR]   1631 Lactic acid still pending I am calling lab to figure out why. [JR]   1717 Care turned over to Dr. Campbell []      ED Course User Index  [JR] Shayne Hoffman DO             Clinical Impression:   Final diagnoses:  [R06.00] Dyspnea  [J96.02] Acute hypercapnic respiratory failure (Primary)  [J44.1] COPD exacerbation  [Z97.8] Endotracheally intubated  [R06.02] SOB (shortness of breath)  [I46.9] Cardiac arrest  [A41.9, R65.21] Septic shock          ED Disposition Condition    Admit               Bernadette Campbell MD  06/17/22 1935       Bernadette Campbell MD  06/17/22 1937       Bernadette Campbell MD  07/14/22 [id number]

## 2022-06-17 NOTE — ED TRIAGE NOTES
Sob x worse since last pm.  Stats at home in high 80's, on 2 lpm O2 at home.  EMS upped Oxygen to 4 lpm with better O2 sats.  + rough cough, green in color.  + fever (102.1 at home, Tylenol 1000 mg at 7:30 am w/ relief.).

## 2022-06-18 ENCOUNTER — CLINICAL SUPPORT (OUTPATIENT)
Dept: CARDIOLOGY | Facility: HOSPITAL | Age: 57
DRG: 208 | End: 2022-06-18
Attending: INTERNAL MEDICINE
Payer: MEDICARE

## 2022-06-18 VITALS — HEIGHT: 64 IN | WEIGHT: 104 LBS | BODY MASS INDEX: 17.75 KG/M2

## 2022-06-18 PROBLEM — I47.20 VT (VENTRICULAR TACHYCARDIA): Status: ACTIVE | Noted: 2022-06-18

## 2022-06-18 PROBLEM — F41.1 GAD (GENERALIZED ANXIETY DISORDER): Chronic | Status: ACTIVE | Noted: 2022-06-18

## 2022-06-18 PROBLEM — J44.9 COPD (CHRONIC OBSTRUCTIVE PULMONARY DISEASE): Chronic | Status: ACTIVE | Noted: 2022-06-18

## 2022-06-18 PROBLEM — D64.9 ANEMIA: Chronic | Status: ACTIVE | Noted: 2022-06-18

## 2022-06-18 PROBLEM — E43 SEVERE MALNUTRITION: Chronic | Status: ACTIVE | Noted: 2022-06-18

## 2022-06-18 PROBLEM — D72.829 LEUCOCYTOSIS: Status: ACTIVE | Noted: 2022-06-18

## 2022-06-18 PROBLEM — E87.1 HYPONATREMIA: Status: ACTIVE | Noted: 2022-06-18

## 2022-06-18 LAB
ALLENS TEST: ABNORMAL
ANION GAP SERPL CALC-SCNC: 7 MMOL/L (ref 8–16)
AV INDEX (PROSTH): 0.9
AV MEAN GRADIENT: 2 MMHG
AV VALVE AREA: 4.09 CM2
BASO STIPL BLD QL SMEAR: ABNORMAL
BASOPHILS # BLD AUTO: 0.01 K/UL (ref 0–0.2)
BASOPHILS NFR BLD: 0 % (ref 0–1.9)
BSA FOR ECHO PROCEDURE: 1.46 M2
BUN SERPL-MCNC: 15 MG/DL (ref 6–20)
CA-I BLDV-SCNC: 1 MMOL/L (ref 1.06–1.42)
CALCIUM SERPL-MCNC: 7.8 MG/DL (ref 8.7–10.5)
CHLORIDE SERPL-SCNC: 85 MMOL/L (ref 95–110)
CO2 SERPL-SCNC: 40 MMOL/L (ref 23–29)
CREAT SERPL-MCNC: 0.5 MG/DL (ref 0.5–1.4)
DELSYS: ABNORMAL
DIFFERENTIAL METHOD: ABNORMAL
DOP CALC AO VTI: 14.26 CM
DOP CALC LVOT AREA: 4.6 CM2
DOP CALC LVOT DIAMETER: 2.41 CM
DOP CALC LVOT PEAK VEL: 77 M/S
DOP CALC LVOT STROKE VOLUME: 58.27 CM3
DOP CALCLVOT PEAK VEL VTI: 12.78 CM
E WAVE DECELERATION TIME: 242.9 MSEC
E/A RATIO: 0.63
EJECTION FRACTION: 25 %
EOSINOPHIL # BLD AUTO: 0 K/UL (ref 0–0.5)
EOSINOPHIL NFR BLD: 0 % (ref 0–8)
EP: 8
ERYTHROCYTE [DISTWIDTH] IN BLOOD BY AUTOMATED COUNT: 15.5 % (ref 11.5–14.5)
ERYTHROCYTE [SEDIMENTATION RATE] IN BLOOD BY WESTERGREN METHOD: 25 MM/H
ERYTHROCYTE [SEDIMENTATION RATE] IN BLOOD BY WESTERGREN METHOD: 25 MM/H
ERYTHROCYTE [SEDIMENTATION RATE] IN BLOOD BY WESTERGREN METHOD: 30 MM/H
EST. GFR  (AFRICAN AMERICAN): >60 ML/MIN/1.73 M^2
EST. GFR  (NON AFRICAN AMERICAN): >60 ML/MIN/1.73 M^2
FIO2: 100
FIO2: 30
FIO2: 32
FIO2: 32
FIO2: 40
FIO2: 50
FIO2: 60
FLOW: 3
FLOW: 3
FOLATE SERPL-MCNC: 20.1 NG/ML (ref 4–24)
FRACTIONAL SHORTENING: 36 % (ref 28–44)
GLUCOSE SERPL-MCNC: 101 MG/DL (ref 70–110)
GLUCOSE SERPL-MCNC: 94 MG/DL (ref 70–110)
HCO3 UR-SCNC: 39.3 MMOL/L (ref 24–28)
HCO3 UR-SCNC: 48.2 MMOL/L (ref 24–28)
HCO3 UR-SCNC: 50 MMOL/L (ref 24–28)
HCO3 UR-SCNC: 51.5 MMOL/L (ref 24–28)
HCO3 UR-SCNC: ABNORMAL MMOL/L
HCT VFR BLD AUTO: 30.8 % (ref 37–48.5)
HCT VFR BLD CALC: 32 %PCV (ref 36–54)
HGB BLD-MCNC: 9.5 G/DL (ref 12–16)
HYPOCHROMIA BLD QL SMEAR: ABNORMAL
IMM GRANULOCYTES # BLD AUTO: 0.17 K/UL (ref 0–0.04)
IMM GRANULOCYTES NFR BLD AUTO: 0.7 % (ref 0–0.5)
IP: 22
IVRT: 157.47 MSEC
LEFT INTERNAL DIMENSION IN SYSTOLE: 3.21 CM (ref 2.1–4)
LEFT VENTRICLE DIASTOLIC VOLUME INDEX: 84.7 ML/M2
LEFT VENTRICLE DIASTOLIC VOLUME: 125.36 ML
LEFT VENTRICLE SYSTOLIC VOLUME INDEX: 22.3 ML/M2
LEFT VENTRICLE SYSTOLIC VOLUME: 33.06 ML
LEFT VENTRICULAR INTERNAL DIMENSION IN DIASTOLE: 5 CM (ref 3.5–6)
LYMPHOCYTES # BLD AUTO: 1.3 K/UL (ref 1–4.8)
LYMPHOCYTES NFR BLD: 5.6 % (ref 18–48)
MAGNESIUM SERPL-MCNC: 2.2 MG/DL (ref 1.6–2.6)
MCH RBC QN AUTO: 28.1 PG (ref 27–31)
MCHC RBC AUTO-ENTMCNC: 30.8 G/DL (ref 32–36)
MCV RBC AUTO: 91 FL (ref 82–98)
MODE: ABNORMAL
MONOCYTES # BLD AUTO: 0.7 K/UL (ref 0.3–1)
MONOCYTES NFR BLD: 3.1 % (ref 4–15)
MRSA SCREEN BY PCR: NEGATIVE
MV PEAK A VEL: 1.05 M/S
MV PEAK E VEL: 0.66 M/S
NEUTROPHILS # BLD AUTO: 20.5 K/UL (ref 1.8–7.7)
NEUTROPHILS NFR BLD: 90.6 % (ref 38–73)
NRBC BLD-RTO: 0 /100 WBC
PCO2 BLDA: 50.9 MMHG (ref 35–45)
PCO2 BLDA: 59.9 MMHG (ref 35–45)
PCO2 BLDA: 64.4 MMHG (ref 35–45)
PCO2 BLDA: 85.3 MMHG (ref 35–45)
PCO2 BLDA: >130 MMHG (ref 35–45)
PEEP: 5
PH SMN: 7.12 [PH] (ref 7.35–7.45)
PH SMN: 7.18 [PH] (ref 7.35–7.45)
PH SMN: 7.19 [PH] (ref 7.35–7.45)
PH SMN: 7.36 [PH] (ref 7.35–7.45)
PH SMN: 7.5 [PH] (ref 7.35–7.45)
PH SMN: 7.51 [PH] (ref 7.35–7.45)
PH SMN: 7.53 [PH] (ref 7.35–7.45)
PHOSPHATE SERPL-MCNC: 2.6 MG/DL (ref 2.7–4.5)
PISA TR MAX VEL: 2.32 M/S
PLATELET # BLD AUTO: 216 K/UL (ref 150–450)
PLATELET BLD QL SMEAR: ABNORMAL
PMV BLD AUTO: 8.6 FL (ref 9.2–12.9)
PO2 BLDA: 104 MMHG (ref 80–100)
PO2 BLDA: 112 MMHG (ref 80–100)
PO2 BLDA: 195 MMHG (ref 80–100)
PO2 BLDA: 550 MMHG (ref 80–100)
PO2 BLDA: 79 MMHG (ref 80–100)
PO2 BLDA: 87 MMHG (ref 80–100)
PO2 BLDA: 92 MMHG (ref 80–100)
POC BE: 16 MMOL/L
POC BE: 23 MMOL/L
POC BE: 27 MMOL/L
POC BE: 28 MMOL/L
POC BE: ABNORMAL MMOL/L
POC IONIZED CALCIUM: 1.08 MMOL/L (ref 1.06–1.42)
POC SATURATED O2: 100 % (ref 95–100)
POC SATURATED O2: 100 % (ref 95–100)
POC SATURATED O2: 96 % (ref 95–100)
POC SATURATED O2: 99 % (ref 95–100)
POC SATURATED O2: ABNORMAL %
POC TCO2: 41 MMOL/L (ref 23–27)
POC TCO2: >50 MMOL/L (ref 23–27)
POC TCO2: ABNORMAL MMOL/L
POTASSIUM BLD-SCNC: 3.9 MMOL/L (ref 3.5–5.1)
POTASSIUM SERPL-SCNC: 3.7 MMOL/L (ref 3.5–5.1)
PULM VEIN S/D RATIO: 1.24
PV PEAK D VEL: 45.96 M/S
PV PEAK S VEL: 57.2 M/S
RBC # BLD AUTO: 3.38 M/UL (ref 4–5.4)
RIGHT VENTRICULAR END-DIASTOLIC DIMENSION: 350 CM
SAMPLE: ABNORMAL
SITE: ABNORMAL
SODIUM BLD-SCNC: 133 MMOL/L (ref 136–145)
SODIUM SERPL-SCNC: 132 MMOL/L (ref 136–145)
SP02: 100
SP02: 95
SP02: 98
TR MAX PG: 22 MMHG
VIT B12 SERPL-MCNC: 249 PG/ML (ref 210–950)
VT: 380
VT: 380
VT: 400
VT: 400
WBC # BLD AUTO: 22.69 K/UL (ref 3.9–12.7)

## 2022-06-18 PROCEDURE — 87070 CULTURE OTHR SPECIMN AEROBIC: CPT | Performed by: INTERNAL MEDICINE

## 2022-06-18 PROCEDURE — 93010 ELECTROCARDIOGRAM REPORT: CPT | Mod: ,,, | Performed by: GENERAL PRACTICE

## 2022-06-18 PROCEDURE — 99900026 HC AIRWAY MAINTENANCE (STAT)

## 2022-06-18 PROCEDURE — 99900031 HC PATIENT EDUCATION (STAT)

## 2022-06-18 PROCEDURE — 27000221 HC OXYGEN, UP TO 24 HOURS

## 2022-06-18 PROCEDURE — 25000003 PHARM REV CODE 250: Performed by: FAMILY MEDICINE

## 2022-06-18 PROCEDURE — 84100 ASSAY OF PHOSPHORUS: CPT | Performed by: FAMILY MEDICINE

## 2022-06-18 PROCEDURE — 99291 CRITICAL CARE FIRST HOUR: CPT | Mod: ,,, | Performed by: INTERNAL MEDICINE

## 2022-06-18 PROCEDURE — S4991 NICOTINE PATCH NONLEGEND: HCPCS | Performed by: FAMILY MEDICINE

## 2022-06-18 PROCEDURE — 93306 TTE W/DOPPLER COMPLETE: CPT | Mod: 26,,, | Performed by: GENERAL PRACTICE

## 2022-06-18 PROCEDURE — 85025 COMPLETE CBC W/AUTO DIFF WBC: CPT | Performed by: FAMILY MEDICINE

## 2022-06-18 PROCEDURE — 93306 ECHO (CUPID ONLY): ICD-10-PCS | Mod: 26,,, | Performed by: GENERAL PRACTICE

## 2022-06-18 PROCEDURE — 87205 SMEAR GRAM STAIN: CPT | Performed by: INTERNAL MEDICINE

## 2022-06-18 PROCEDURE — 25000003 PHARM REV CODE 250: Performed by: INTERNAL MEDICINE

## 2022-06-18 PROCEDURE — 84132 ASSAY OF SERUM POTASSIUM: CPT

## 2022-06-18 PROCEDURE — 25000242 PHARM REV CODE 250 ALT 637 W/ HCPCS: Performed by: FAMILY MEDICINE

## 2022-06-18 PROCEDURE — 85014 HEMATOCRIT: CPT

## 2022-06-18 PROCEDURE — 82607 VITAMIN B-12: CPT | Performed by: INTERNAL MEDICINE

## 2022-06-18 PROCEDURE — 84295 ASSAY OF SERUM SODIUM: CPT

## 2022-06-18 PROCEDURE — 93010 EKG 12-LEAD: ICD-10-PCS | Mod: ,,, | Performed by: GENERAL PRACTICE

## 2022-06-18 PROCEDURE — 63600175 PHARM REV CODE 636 W HCPCS: Performed by: FAMILY MEDICINE

## 2022-06-18 PROCEDURE — 82803 BLOOD GASES ANY COMBINATION: CPT

## 2022-06-18 PROCEDURE — 94761 N-INVAS EAR/PLS OXIMETRY MLT: CPT

## 2022-06-18 PROCEDURE — 80048 BASIC METABOLIC PNL TOTAL CA: CPT | Performed by: FAMILY MEDICINE

## 2022-06-18 PROCEDURE — 99221 1ST HOSP IP/OBS SF/LOW 40: CPT | Mod: 25,,, | Performed by: GENERAL PRACTICE

## 2022-06-18 PROCEDURE — 99221 PR INITIAL HOSPITAL CARE,LEVL I: ICD-10-PCS | Mod: 25,,, | Performed by: GENERAL PRACTICE

## 2022-06-18 PROCEDURE — 99223 PR INITIAL HOSPITAL CARE,LEVL III: ICD-10-PCS | Mod: ,,, | Performed by: SURGERY

## 2022-06-18 PROCEDURE — 99291 PR CRITICAL CARE, E/M 30-74 MINUTES: ICD-10-PCS | Mod: ,,, | Performed by: INTERNAL MEDICINE

## 2022-06-18 PROCEDURE — S4991 NICOTINE PATCH NONLEGEND: HCPCS | Performed by: INTERNAL MEDICINE

## 2022-06-18 PROCEDURE — 63600175 PHARM REV CODE 636 W HCPCS: Performed by: INTERNAL MEDICINE

## 2022-06-18 PROCEDURE — 93306 TTE W/DOPPLER COMPLETE: CPT

## 2022-06-18 PROCEDURE — 94640 AIRWAY INHALATION TREATMENT: CPT

## 2022-06-18 PROCEDURE — 37799 UNLISTED PX VASCULAR SURGERY: CPT

## 2022-06-18 PROCEDURE — 99223 1ST HOSP IP/OBS HIGH 75: CPT | Mod: ,,, | Performed by: SURGERY

## 2022-06-18 PROCEDURE — 36415 COLL VENOUS BLD VENIPUNCTURE: CPT | Performed by: INTERNAL MEDICINE

## 2022-06-18 PROCEDURE — 82330 ASSAY OF CALCIUM: CPT

## 2022-06-18 PROCEDURE — 82746 ASSAY OF FOLIC ACID SERUM: CPT | Performed by: INTERNAL MEDICINE

## 2022-06-18 PROCEDURE — 20000000 HC ICU ROOM

## 2022-06-18 PROCEDURE — 94003 VENT MGMT INPAT SUBQ DAY: CPT

## 2022-06-18 PROCEDURE — 83735 ASSAY OF MAGNESIUM: CPT | Performed by: FAMILY MEDICINE

## 2022-06-18 PROCEDURE — 82330 ASSAY OF CALCIUM: CPT | Performed by: FAMILY MEDICINE

## 2022-06-18 PROCEDURE — 93005 ELECTROCARDIOGRAM TRACING: CPT | Performed by: GENERAL PRACTICE

## 2022-06-18 PROCEDURE — 99900035 HC TECH TIME PER 15 MIN (STAT)

## 2022-06-18 PROCEDURE — 25000003 PHARM REV CODE 250: Performed by: GENERAL PRACTICE

## 2022-06-18 PROCEDURE — 87641 MR-STAPH DNA AMP PROBE: CPT | Performed by: FAMILY MEDICINE

## 2022-06-18 RX ORDER — MAGNESIUM SULFATE HEPTAHYDRATE 40 MG/ML
4 INJECTION, SOLUTION INTRAVENOUS
Status: DISCONTINUED | OUTPATIENT
Start: 2022-06-18 | End: 2022-06-25 | Stop reason: HOSPADM

## 2022-06-18 RX ORDER — MAGNESIUM SULFATE HEPTAHYDRATE 40 MG/ML
2 INJECTION, SOLUTION INTRAVENOUS
Status: DISCONTINUED | OUTPATIENT
Start: 2022-06-18 | End: 2022-06-25 | Stop reason: HOSPADM

## 2022-06-18 RX ORDER — POTASSIUM CHLORIDE 14.9 MG/ML
60 INJECTION INTRAVENOUS
Status: DISCONTINUED | OUTPATIENT
Start: 2022-06-18 | End: 2022-06-25 | Stop reason: HOSPADM

## 2022-06-18 RX ORDER — POTASSIUM CHLORIDE 29.8 MG/ML
80 INJECTION INTRAVENOUS
Status: DISCONTINUED | OUTPATIENT
Start: 2022-06-18 | End: 2022-06-25 | Stop reason: HOSPADM

## 2022-06-18 RX ORDER — IPRATROPIUM BROMIDE AND ALBUTEROL SULFATE 2.5; .5 MG/3ML; MG/3ML
3 SOLUTION RESPIRATORY (INHALATION) EVERY 4 HOURS
Status: DISCONTINUED | OUTPATIENT
Start: 2022-06-18 | End: 2022-06-23

## 2022-06-18 RX ORDER — FAMOTIDINE 20 MG/50ML
20 INJECTION, SOLUTION INTRAVENOUS 2 TIMES DAILY
Status: DISCONTINUED | OUTPATIENT
Start: 2022-06-18 | End: 2022-06-18

## 2022-06-18 RX ORDER — LEVOFLOXACIN 5 MG/ML
750 INJECTION, SOLUTION INTRAVENOUS
Status: DISCONTINUED | OUTPATIENT
Start: 2022-06-18 | End: 2022-06-18

## 2022-06-18 RX ORDER — IPRATROPIUM BROMIDE AND ALBUTEROL SULFATE 2.5; .5 MG/3ML; MG/3ML
3 SOLUTION RESPIRATORY (INHALATION) EVERY 4 HOURS PRN
Status: DISCONTINUED | OUTPATIENT
Start: 2022-06-18 | End: 2022-06-25 | Stop reason: HOSPADM

## 2022-06-18 RX ORDER — POTASSIUM CHLORIDE 29.8 MG/ML
40 INJECTION INTRAVENOUS
Status: DISCONTINUED | OUTPATIENT
Start: 2022-06-18 | End: 2022-06-25 | Stop reason: HOSPADM

## 2022-06-18 RX ORDER — METOPROLOL TARTRATE 25 MG/1
25 TABLET, FILM COATED ORAL 2 TIMES DAILY
Status: DISCONTINUED | OUTPATIENT
Start: 2022-06-18 | End: 2022-06-22

## 2022-06-18 RX ORDER — IBUPROFEN 200 MG
1 TABLET ORAL DAILY
Status: DISCONTINUED | OUTPATIENT
Start: 2022-06-18 | End: 2022-06-20

## 2022-06-18 RX ORDER — FAMOTIDINE 10 MG/ML
20 INJECTION INTRAVENOUS 2 TIMES DAILY
Status: DISCONTINUED | OUTPATIENT
Start: 2022-06-18 | End: 2022-06-20

## 2022-06-18 RX ORDER — ALPRAZOLAM 0.25 MG/1
0.25 TABLET ORAL 3 TIMES DAILY PRN
Status: DISCONTINUED | OUTPATIENT
Start: 2022-06-18 | End: 2022-06-25 | Stop reason: HOSPADM

## 2022-06-18 RX ORDER — POTASSIUM CHLORIDE 750 MG/1
10 CAPSULE, EXTENDED RELEASE ORAL ONCE
Status: DISCONTINUED | OUTPATIENT
Start: 2022-06-18 | End: 2022-06-18

## 2022-06-18 RX ORDER — METHYLPREDNISOLONE SOD SUCC 125 MG
60 VIAL (EA) INJECTION EVERY 8 HOURS
Status: DISCONTINUED | OUTPATIENT
Start: 2022-06-18 | End: 2022-06-19

## 2022-06-18 RX ADMIN — POTASSIUM CHLORIDE 40 MEQ: 29.8 INJECTION, SOLUTION INTRAVENOUS at 05:06

## 2022-06-18 RX ADMIN — ENOXAPARIN SODIUM 40 MG: 40 INJECTION SUBCUTANEOUS at 05:06

## 2022-06-18 RX ADMIN — IPRATROPIUM BROMIDE AND ALBUTEROL SULFATE 3 ML: .5; 3 SOLUTION RESPIRATORY (INHALATION) at 07:06

## 2022-06-18 RX ADMIN — IPRATROPIUM BROMIDE AND ALBUTEROL SULFATE 3 ML: .5; 3 SOLUTION RESPIRATORY (INHALATION) at 12:06

## 2022-06-18 RX ADMIN — NICOTINE 1 PATCH: 14 PATCH, EXTENDED RELEASE TRANSDERMAL at 12:06

## 2022-06-18 RX ADMIN — ALPRAZOLAM 0.25 MG: 0.25 TABLET ORAL at 03:06

## 2022-06-18 RX ADMIN — IPRATROPIUM BROMIDE AND ALBUTEROL SULFATE 3 ML: .5; 3 SOLUTION RESPIRATORY (INHALATION) at 04:06

## 2022-06-18 RX ADMIN — MEROPENEM AND SODIUM CHLORIDE 1 G: 1 INJECTION, SOLUTION INTRAVENOUS at 10:06

## 2022-06-18 RX ADMIN — METHYLPREDNISOLONE SODIUM SUCCINATE 80 MG: 40 INJECTION, POWDER, FOR SOLUTION INTRAMUSCULAR; INTRAVENOUS at 05:06

## 2022-06-18 RX ADMIN — ALPRAZOLAM 0.25 MG: 0.25 TABLET ORAL at 10:06

## 2022-06-18 RX ADMIN — ENOXAPARIN SODIUM 40 MG: 40 INJECTION SUBCUTANEOUS at 12:06

## 2022-06-18 RX ADMIN — ONDANSETRON 4 MG: 2 INJECTION INTRAMUSCULAR; INTRAVENOUS at 09:06

## 2022-06-18 RX ADMIN — FAMOTIDINE 20 MG: 10 INJECTION INTRAVENOUS at 09:06

## 2022-06-18 RX ADMIN — METHYLPREDNISOLONE SODIUM SUCCINATE 60 MG: 125 INJECTION, POWDER, FOR SOLUTION INTRAMUSCULAR; INTRAVENOUS at 10:06

## 2022-06-18 RX ADMIN — MEROPENEM AND SODIUM CHLORIDE 1 G: 1 INJECTION, SOLUTION INTRAVENOUS at 05:06

## 2022-06-18 RX ADMIN — CALCIUM GLUCONATE 2 G: 98 INJECTION, SOLUTION INTRAVENOUS at 08:06

## 2022-06-18 RX ADMIN — SODIUM PHOSPHATE, MONOBASIC, MONOHYDRATE 15 MMOL: 276; 142 INJECTION, SOLUTION INTRAVENOUS at 08:06

## 2022-06-18 RX ADMIN — METOPROLOL TARTRATE 25 MG: 25 TABLET, FILM COATED ORAL at 10:06

## 2022-06-18 RX ADMIN — IPRATROPIUM BROMIDE AND ALBUTEROL SULFATE 3 ML: .5; 3 SOLUTION RESPIRATORY (INHALATION) at 11:06

## 2022-06-18 RX ADMIN — IPRATROPIUM BROMIDE AND ALBUTEROL SULFATE 3 ML: .5; 3 SOLUTION RESPIRATORY (INHALATION) at 03:06

## 2022-06-18 RX ADMIN — NICOTINE 1 PATCH: 21 PATCH, EXTENDED RELEASE TRANSDERMAL at 02:06

## 2022-06-18 RX ADMIN — LATANOPROST 1 DROP: 50 SOLUTION OPHTHALMIC at 10:06

## 2022-06-18 RX ADMIN — FAMOTIDINE 20 MG: 10 INJECTION INTRAVENOUS at 10:06

## 2022-06-18 RX ADMIN — MEROPENEM AND SODIUM CHLORIDE 1 G: 1 INJECTION, SOLUTION INTRAVENOUS at 02:06

## 2022-06-18 RX ADMIN — METHYLPREDNISOLONE SODIUM SUCCINATE 80 MG: 40 INJECTION, POWDER, FOR SOLUTION INTRAMUSCULAR; INTRAVENOUS at 02:06

## 2022-06-18 RX ADMIN — LEUCINE, PHENYLALANINE, LYSINE, METHIONINE, ISOLEUCINE, VALINE, HISTIDINE, THREONINE, TRYPTOPHAN, ALANINE, GLYCINE, ARGININE, PROLINE, SERINE, TYROSINE, SODIUM ACETATE, DIBASIC POTASSIUM PHOSPHATE, MAGNESIUM CHLORIDE, SODIUM CHLORIDE, CALCIUM CHLORIDE, DEXTROSE
311; 238; 247; 170; 255; 247; 204; 179; 77; 880; 438; 489; 289; 213; 17; 297; 261; 51; 77; 33; 5 INJECTION INTRAVENOUS at 02:06

## 2022-06-18 RX ADMIN — LATANOPROST 1 DROP: 50 SOLUTION OPHTHALMIC at 12:06

## 2022-06-18 NOTE — SUBJECTIVE & OBJECTIVE
Interval History:  See hospital course.  Collateral obtained from sister over the phone.  Patient is also writing and giving thumbs up.  In December reportedly had emergent bowel surgery in [facility], bowel resection, since then has had a chronic abdominal pain, intermittent episodes of worsening, loose stool followed by gastroenterology and apparently on b.i.d. Questran.  Chronic respiratory failure, 3 L oxygen, producing greenish phlegm.  Denies any abdominal pain today.  Not on Levophed.  T-max 98.8°.  Orally intubated on ventilator FiO2 35 with 5 of PEEP.  WBC 22, hemoglobin 9.5, potassium 3.7, magnesium 2.2, troponin negative, lactic acid 0.8, procalcitonin 0.05.  Chest x-ray with right lung pneumonia.  CT with concern for partial large bowel obstruction.  Discussed with patient.  Discussed with family members at bedside.  Discussed with nursing.    Review of Systems   Constitutional:  Positive for appetite change and fatigue. Negative for fever.   Respiratory:  Positive for cough.    Gastrointestinal:  Negative for abdominal pain.   Neurological:  Positive for weakness.   Psychiatric/Behavioral:  Negative for confusion.    Objective:     Vital Signs (Most Recent):  Temp: 99.1 °F (37.3 °C) (06/18/22 0701)  Pulse: 87 (06/18/22 1116)  Resp: 20 (06/18/22 1116)  BP: (!) 181/79 (06/18/22 1100)  SpO2: 98 % (06/18/22 1116)   Vital Signs (24h Range):  Temp:  [98.4 °F (36.9 °C)-99.8 °F (37.7 °C)] 99.1 °F (37.3 °C)  Pulse:  [] 87  Resp:  [15-41] 20  SpO2:  [63 %-100 %] 98 %  BP: ()/() 181/79  Arterial Line BP: ()/(-9-89) 104/56     Weight: 47.6 kg (104 lb 15 oz)  Body mass index is 18.01 kg/m².    Intake/Output Summary (Last 24 hours) at 6/18/2022 1418  Last data filed at 6/18/2022 1118  Gross per 24 hour   Intake 4660.28 ml   Output 795 ml   Net 3865.28 ml      Physical Exam  Vitals and nursing note reviewed.   Constitutional:       Comments: Critically and chronically ill-appearing   HENT:       Head: Normocephalic and atraumatic.   Cardiovascular:      Rate and Rhythm: Normal rate and regular rhythm.   Pulmonary:      Comments: Orally intubated on mechanical ventilation FiO2 of 35 with 5 of PEEP  Abdominal:      Comments: Mildly distended but soft, bowel sounds heard right lower abdomen, nontender   Genitourinary:     Comments: Wong catheter in place with yellow urine draining  Musculoskeletal:      Cervical back: Neck supple.      Comments: Evidence of severe muscle wasting, cachexia with low BMI   Skin:     Comments: Bruising upper extremities, healed lesions lower extremity.  Right radial arterial line with some oozing noted   Neurological:      Mental Status: She is alert and oriented to person, place, and time.      Comments: On fentanyl but alert, oriented, communicating via written       Significant Labs: BMP:   Recent Labs   Lab 06/18/22  0304      *   K 3.7   CL 85*   CO2 40*   BUN 15   CREATININE 0.5   CALCIUM 7.8*   MG 2.2     CBC:   Recent Labs   Lab 06/17/22  1307 06/18/22  0304 06/18/22  0411   WBC 17.99* 22.69*  --    HGB 11.0* 9.5*  --    HCT 37.2 30.8* 32*    216  --      CMP:   Recent Labs   Lab 06/17/22  1307 06/18/22  0304   * 132*   K 4.3 3.7   CL 80* 85*   CO2 48* 40*   GLU 82 101   BUN 10 15   CREATININE 0.3* 0.5   CALCIUM 8.5* 7.8*   PROT 6.9  --    ALBUMIN 3.6  --    BILITOT 1.0  --    ALKPHOS 52*  --    AST 15  --    ALT 10  --    ANIONGAP 6* 7*   EGFRNONAA >60.0 >60.0     Cardiac Markers:   Recent Labs   Lab 06/17/22  1307   BNP 60     Lactic Acid:   Recent Labs   Lab 06/17/22  1307   LACTATE 0.8     Magnesium:   Recent Labs   Lab 06/17/22  1307 06/18/22  0304   MG 1.6 2.2     POCT Glucose: No results for input(s): POCTGLUCOSE in the last 48 hours.    Significant Imaging: I have reviewed and interpreted all pertinent imaging results/findings within the past 24 hours.

## 2022-06-18 NOTE — CONSULTS
06/18/2022      Admit Date: 6/17/2022  Estelle Cast  New Patient Consult    Chief Complaint   Patient presents with    Cough    Shortness of Breath     Cough and SOB x 3 days        History of Present Illness:  Pt is a 57 yo female with COPD, malnutrition who presented to ED with resp distress and experienced code blue after arrival requiring 10min CPR, mag for torsades then had ROSC and was waking up and responsive. Pt admitted to ICU and pulmonary is consulted for further management/recommendations.  Further history is obtained over the phone with pt's sister and RAMIREZ Knightor Khanh. She states pt was living in [place] where she had a pulmonologist caring for her, had very poor health with frequent admissions lately for COPD with CO2 retention (10 times so far this year) and moved to [place] to stay w/ sister 4 days ago. She developed acute on chronic abdominal pain and distention which made it difficult for her to breathe then had difficulty keeping sats up with home O2- uses 3L continuously. She was supposed to have NIV for home but had difficulty tolerating it and then difficulty getting it in [place]. Pt has had SBO requiring bowel resection 12/2021 and has had complications with abd pain, trouble eating off and on since then. She is noted to have SBO on this admission and surgery is consulted for further recommendations. Pt is a current smoker and wants to quit.      PFSH:  Past Medical History:   Diagnosis Date    Anemia, unspecified     Asthma     Chronic respiratory failure with hypoxia     COPD (chronic obstructive pulmonary disease)     Dysuria     Hypokalemia     Hypomagnesemia     Hyponatremia     Malnutrition     Sepsis, unspecified organism      Past Surgical History:   Procedure Laterality Date    ABDOMINAL SURGERY      CHOLECYSTECTOMY      COLECTOMY      12/2021    HYSTERECTOMY       Social History     Tobacco Use    Smoking status: Current Every Day Smoker     Packs/day: 0.50  "   Smokeless tobacco: Never Used   Substance Use Topics    Alcohol use: Not Currently    Drug use: Yes     Types: Marijuana     Comment: x 5 days ago     History reviewed. No pertinent family history.  Review of patient's allergies indicates:   Allergen Reactions    Celexa [citalopram]     Breztri aerosphere [budesonide-glycopyr-formoterol]     Pcn [penicillins]     Shellfish containing products     Advair diskus [fluticasone propion-salmeterol] Rash    Buspirone (bulk) Rash    Chantix [varenicline] Nausea And Vomiting       Performance Status:Performance Status:The patient's activity level is housebound activities.    Review of Systems:  due to neurologic status/impairments a Review of Systems could not be obtained       Exam:Comprehensive exam done. BP 98/73   Pulse 93   Temp 99.1 °F (37.3 °C) (Oral)   Resp 20   Ht 5' 4" (1.626 m)   Wt 47.6 kg (104 lb 15 oz)   SpO2 95%   Breastfeeding No   BMI 18.01 kg/m²   Exam included Vitals as listed, and patient's appearance and affect and alertness and mood, oral exam for yeast and hygiene and pharynx lesions and Mallapatti (M) score, neck with inspection for jvd and masses and thyroid abnormalities and lymph nodes (supraclavicular and infraclavicular nodes also examined and noted if abn), chest exam included symmetry and effort and fremitus and percussion and auscultation, cardiac exam included rhythm and gallops and murmur and rubs and jvd and edema, abdominal exam for mass and hepatosplenomegaly and tenderness and hernias and bowel sounds, Musculoskeletal exam with muscle tone and posture and mobility/gait and  strenght, and skin for rashes and cyanosis and pallor and turgor, extremity for clubbing.  Findings were normal except as listed below:  Intubated, sedated  Opens eyes  PERRL  Cachectic and chronically ill appearing  Coughs w/ suction  Bilateral diminished breath sounds  abd soft, hypoactive BS, midline ex lap scar healed  No " edema      Radiographs reviewed: view by direct vision   CTA chest 6/17- severe emphysema, R side scattered infiltrates w/ bronchiectasis which look infectious.  CT abd/p 6/17-   1.  Right lower lobe pneumonia.  2.  Large stool burden in the right colon with prior operative changes at the hepatic flexure and relative transition point suggests partial colonic obstruction.  3.  Hepatic steatosis.  CXR 6/18- hyperinflation, copd, osteopenia    Labs     Recent Labs   Lab 06/18/22  0304 06/18/22  0411   WBC 22.69*  --    HGB 9.5*  --    HCT 30.8* 32*     --      Recent Labs   Lab 06/17/22  1307 06/18/22  0304 06/18/22  0501   * 132*  --    K 4.3 3.7  --    CL 80* 85*  --    CO2 48* 40*  --    BUN 10 15  --    CREATININE 0.3* 0.5  --    GLU 82 101  --    CALCIUM 8.5* 7.8*  --    CAION  --   --  1.00*   MG 1.6 2.2  --    PHOS  --  2.6*  --    AST 15  --   --    ALT 10  --   --    ALKPHOS 52*  --   --    BILITOT 1.0  --   --    PROT 6.9  --   --    ALBUMIN 3.6  --   --    PROCAL 0.05  --   --    LACTATE 0.8  --   --    TROPONINI <0.030  --   --    BNP 60  --   --      Recent Labs   Lab 06/18/22  0524   PH 7.530*   PCO2 59.9*   PO2 112*   HCO3 50.0*     Microbiology Results (last 7 days)     Procedure Component Value Units Date/Time    Culture, Respiratory with Gram Stain [[id number]]     Order Status: No result Specimen: Respiratory from Tracheal Aspirate     MRSA Screen by PCR [[id number]] Collected: 06/18/22 0121    Order Status: Completed Specimen: Nasopharyngeal Swab from Nasal Updated: 06/18/22 0358     MRSA SCREEN BY PCR Negative    Blood culture #1 **CANNOT BE ORDERED STAT** [[id number]] Collected: 06/17/22 1307    Order Status: Completed Specimen: Blood from Peripheral, Antecubital, Left Updated: 06/17/22 2117     Blood Culture, Routine No Growth to date    Blood culture #2 **CANNOT BE ORDERED STAT** [[id number]] Collected: 06/17/22 1320    Order Status: Completed Specimen: Blood from Peripheral,  Antecubital, Right Updated: 06/17/22 2117     Blood Culture, Routine No Growth to date          Impression:  Active Hospital Problems    Diagnosis  POA    Centriacinar emphysema [J43.2]  Yes    Chronic respiratory failure [J96.10]  Yes    Cardiac arrest [I46.9]  Unknown     Patient coded in emergency department  Intubated  Admit to ICU  Consult to Critical Care      Pneumonia [J18.9]  Yes    Smoker [F17.200]  Yes    Abdominal pain [R10.9]  Yes    BMI less than 19,adult [Z68.1]  Not Applicable    Obstructive sleep apnea syndrome [G47.33]  Yes      Resolved Hospital Problems   No resolved problems to display.               Plan:     - continue vent support  - SAT/SBT daily- start in am  - continue inhaled bronchodilators  - per surgery no intervention at this time  - continue meropenem for pneumonia  - f/u sputum culture  - continue TPN  - pepcid for GI prophylaxis  - lovenox for DVT prophylaxis    The following were evaluated and adjusted as needed: mechanical ventilator settings and weaning status, intravenous fluids and nutritional status, antibiotics, acid base balance and oxygenation needs, input and output and renal status and CODE STATUS/OUTLOOK DISCUSSED WITH AVAILABLE NEXT OF  KIN       Critical Care  - THE PATIENT HAS A HIGH PROBABILITY OF IMMINENT OR LIFE THREATENING DETERIORATION.  Over 50%time of encounter was in direct care at bedside.  Time was 30 to 74 minutes required for patient care.  Time needed for all of the above totaled 40 minutes.    Lizzie Negrete MD  Pulmonary & Critical Care Medicine

## 2022-06-18 NOTE — CARE UPDATE
Finding on CT of partial colon obstruction.  Large burden of stool    Pt intubated and so keep NPO

## 2022-06-18 NOTE — ANESTHESIA PROCEDURE NOTES
Arterial line    Diagnosis: Respiratory failure, cardiac arrest    Patient location during procedure: ED  Procedure start time: 6/17/2022 7:25 AM  Timeout: 6/17/2022 7:25 AM  Procedure end time: 6/17/2022 7:30 AM    Staffing  Authorizing Provider: Deni Funk MD  Performing Provider: Deni Funk MD    Anesthesiologist was present at the time of the procedure.    Preanesthetic Checklist  Completed: patient identified, risks and benefits discussed, monitors and equipment checked, timeout performed and anesthesia consent givenArterial line  Skin Prep: chlorhexidine gluconate  Local Infiltration: lidocaine  Orientation: right  Location: radial    Catheter Size: 20 G  Catheter placement by Ultrasound guidance. Heme positive aspiration all ports.   Vessel Caliber: small, patent, compressibility normal  Vascular Doppler:  not done  Needle advanced into vessel with real time Ultrasound guidance.  Sterile sheath used.Insertion Attempts: 1  Assessment  Dressing: sutured in place and taped and tegaderm  Patient: Tolerated well  Additional Notes  Upon my arrival, when I announced to patient and her nieces that I was consulted to place an arterial line, the patient immediately offered her right wrist to me.  Informed consent obtained with patient nodding her head.  Definite arterial waveform but unusual appearance, despite exchanging new and longer catheter over guidewire.  Mean arterial pressure correlates with cuff mean.

## 2022-06-18 NOTE — HPI
57 yo F who presented to hospital yesterday with respiratory distress and upper abdominal pain. Pt with known history of emphysema and heavy smoking istory.  She reportedly required home O2 at 3 L.  Pt had a code event in ER requiring 10 minutes of CPR.  Pt intubated and has been in ICU.  Today in ICU remains intubated.  History over phone from sister. Pt awake, responding to commands and writing some instructions to niece.  She reportedly had ex lap with bowel resection for septic event in 12/21 at another facility.  Struggles with malnutrition and chronic pain.  Per d/w sister her pain yesterday was no worse than previous and is not what brought her to hospiatl>  Had a ct of abd/pelvis demonstrting large burden of stool in colon appears distal to anastomosis.

## 2022-06-18 NOTE — CONSULTS
"[facility], [facility]  Adult Nutrition   Consult Note (Initial Assessment)     SUMMARY     Recommendations  Recommendation/Intervention:   1) Recommend starting PN within 48hrs if unable to advance diet d/t high risk for malnutrition.   2) RD to monitor days NPO and will make recommendations PRN.    Goals: Patient to receive some form of nutrition within 48hrs  Nutrition Goal Status: new  Communication of RD Recs: reviewed with RN    Dietitian Rounds Brief  Pt intubated and sedated. Possible extubation today. Per MD: "Finding on CT of partial colon obstruction. Large burden of stool". Niece at bedside asked mom over the phone about patient's intake PTA. Per mom, pt had a healthy appetite, eating well but did c/o stomach pain and gas.     Diet order:   Current Diet Order: NPO     Evaluation of Received Nutrient/Fluid Intake  Energy Calories Required: not meeting needs  Protein Required: not meeting needs  Tolerance:  (NPO)     % Intake of Estimated Energy Needs: 0%  % Meal Intake: NPO      Intake/Output Summary (Last 24 hours) at 6/18/2022 1233  Last data filed at 6/18/2022 1118  Gross per 24 hour   Intake 4660.28 ml   Output 445 ml   Net 4215.28 ml      Anthropometrics  Temp: 99.1 °F (37.3 °C)  Height Method: Stated  Height: 5' 4" (162.6 cm)  Height (inches): 64 in  Weight Method: Bed Scale  Weight: 47.6 kg (104 lb 15 oz)  Weight (lb): 104.94 lb  Ideal Body Weight (IBW), Female: 120 lb  % Ideal Body Weight, Female (lb): 71.67 %  BMI (Calculated): 18       Estimated/Assessed Needs  Weight Used For Calorie Calculations: 47.6 kg (104 lb 15 oz)  Energy Calorie Requirements (kcal): 1190 (25ml/kg while intubated), 7832-7016 (35-40kcal/kg once extubated)  Energy Need Method: Kcal/kg  Protein Requirements: 57-72gm (1.2-1.5gm/kg)  Weight Used For Protein Calculations: 47.6 kg (104 lb 15 oz)  Fluid Requirements (mL): 1666ml (35ml/kg)     RDA Method (mL): 1190     Reason for Assessment  Reason For Assessment: " consult  Diagnosis:  (SOB)  Relevant Medical History: COPD, BMI 14.8, depression, generalized anxiety, DANIS, extensive smoking    Nutrition/Diet History  Food Allergies: NKFA  Factors Affecting Nutritional Intake: NPO, on mechanical ventilation, altered gastrointestinal function    Nutrition Risk Screen  Nutrition Risk Screen: no indicators present    Weight History:  Wt Readings from Last 5 Encounters:   06/17/22 47.6 kg (104 lb 15 oz)   06/18/22 47.2 kg (104 lb)        Lab/Procedures/Meds: Pertinent Labs/Meds Reviewed    Medications:Pertinent Medications Reviewed  Scheduled Meds:   albuterol-ipratropium  3 mL Nebulization Q4H    enoxaparin  40 mg Subcutaneous Daily    famotidine (PF)  20 mg Intravenous BID    guaiFENesin  1,200 mg Oral Daily    latanoprost  1 drop Both Eyes QHS    meropenem (MERREM) IVPB  1 g Intravenous Q8H    methylPREDNISolone sodium succinate injection  80 mg Intravenous Q8H     Continuous Infusions:   fentanyl 75 mcg/hr (06/17/22 2315)    lactated ringers 75 mL/hr at 06/18/22 0035    NORepinephrine bitartrate-D5W 0.15 mcg/kg/min (06/18/22 8092)     PRN Meds:.acetaminophen, albuterol-ipratropium, ALPRAZolam, calcium gluconate IVPB, calcium gluconate IVPB, calcium gluconate IVPB, dextrose 50%, dextrose 50%, glucagon (human recombinant), glucose, glucose, magnesium oxide, magnesium oxide, magnesium sulfate IVPB, magnesium sulfate IVPB, morphine, morphine, naloxone, ondansetron, polyethylene glycol, potassium bicarbonate, potassium bicarbonate, potassium bicarbonate, potassium chloride in water **AND** potassium chloride in water **AND** potassium chloride in water, prochlorperazine, sodium chloride 0.9%, sodium chloride 0.9%, sodium phosphate IVPB, sodium phosphate IVPB, sodium phosphate IVPB    Labs: Pertinent Labs Reviewed  Clinical Chemistry:  Recent Labs   Lab 06/17/22  1307 06/18/22  0304   * 132*   K 4.3 3.7   CL 80* 85*   CO2 48* 40*   GLU 82 101   BUN 10 15   CREATININE  0.3* 0.5   CALCIUM 8.5* 7.8*   PROT 6.9  --    ALBUMIN 3.6  --    BILITOT 1.0  --    ALKPHOS 52*  --    AST 15  --    ALT 10  --    ANIONGAP 6* 7*   ESTGFRAFRICA >60.0 >60.0   EGFRNONAA >60.0 >60.0   MG 1.6 2.2   PHOS  --  2.6*     CBC:   Recent Labs   Lab 06/18/22  0304 06/18/22  0411   WBC 22.69*  --    RBC 3.38*  --    HGB 9.5*  --    HCT 30.8* 32*     --    MCV 91  --    MCH 28.1  --    MCHC 30.8*  --      Cardiac Profile:  Recent Labs   Lab 06/17/22  1307   BNP 60   TROPONINI <0.030     Monitor and Evaluation  Food and Nutrient Intake: energy intake  Food and Nutrient Adminstration: diet order  Physical Activity and Function: nutrition-related ADLs and IADLs  Anthropometric Measurements: weight change  Biochemical Data, Medical Tests and Procedures: electrolyte and renal panel, gastrointestinal profile, glucose/endocrine profile  Nutrition-Focused Physical Findings: overall appearance     Nutrition Risk  Level of Risk/Frequency of Follow-up: high     Nutrition Follow-Up  RD Follow-up?: Yes      Shefali Wei, DON 06/18/2022 12:33 PM

## 2022-06-18 NOTE — CONSULTS
[facility]  General Surgery  Consult Note    Patient Name: Estelle Cast  MRN: [id number]  Code Status: Full Code  Admission Date: 6/17/2022  Hospital Length of Stay: 1 days  Attending Physician: Reyna Parada MD  Primary Care Provider: Primary Doctor No    Patient information was obtained from patient and ER records.     Inpatient consult to General Surgery  Consult performed by: Bill Graf MD  Consult ordered by: Reyna Parada MD        Subjective:     Principal Problem: Chronic respiratory failure    History of Present Illness: 55 yo F who presented to hospital yesterday with respiratory distress and upper abdominal pain. Pt with known history of emphysema and heavy smoking istory.  She reportedly required home O2 at 3 L.  Pt had a code event in ER requiring 10 minutes of CPR.  Pt intubated and has been in ICU.  Today in ICU remains intubated.  History over phone from sister. Pt awake, responding to commands and writing some instructions to niece.  She reportedly had ex lap with bowel resection for septic event in 12/21 at another facility.  Struggles with malnutrition and chronic pain.  Per d/w sister her pain yesterday was no worse than previous and is not what brought her to hospiatl>  Had a ct of abd/pelvis demonstrting large burden of stool in colon appears distal to anastomosis.        No current facility-administered medications on file prior to encounter.     Current Outpatient Medications on File Prior to Encounter   Medication Sig    acetaminophen (TYLENOL) 500 MG tablet Take 500 mg by mouth every 6 (six) hours as needed for Pain.    albuterol (ACCUNEB) 0.63 mg/3 mL Nebu Take 0.63 mg by nebulization every 6 (six) hours as needed. Rescue    ALPRAZolam (XANAX) 0.25 MG tablet Take by mouth 3 (three) times daily.    azelastine (ASTELIN) 137 mcg (0.1 %) nasal spray 1 spray by Nasal route 2 (two) times daily.    azithromycin (Z-CAMI) 250 MG tablet Take 500 mg by mouth  once daily.    fluticasone furoate (ARNUITY ELLIPTA) 200 mcg/actuation inhaler Inhale 200 mcg into the lungs. Controller    guaiFENesin (MUCINEX) 600 mg 12 hr tablet Take 1,200 mg by mouth once daily.    ipratropium (ATROVENT HFA) 17 mcg/actuation inhaler Inhale 2 puffs into the lungs every 6 (six) hours. Rescue    ipratropium/albuterol sulfate (DUONEB INHL) Inhale into the lungs.    latanoprost, PF, 0.005 % Drop Apply to eye.    neomycin-polymyxin-hydrocortisone (CORTISPORIN) 3.5-10,000-10 mg-unit-mg/mL ophthalmic suspension 1 drop every 4 (four) hours.    nicotine (NICODERM CQ) 14 mg/24 hr Place 1 patch onto the skin every 24 hours.    pantoprazole (PROTONIX) 40 MG tablet Take 40 mg by mouth once daily.       Review of patient's allergies indicates:   Allergen Reactions    Celexa [citalopram]     Breztri aerosphere [budesonide-glycopyr-formoterol]     Pcn [penicillins]     Shellfish containing products     Advair diskus [fluticasone propion-salmeterol] Rash    Buspirone (bulk) Rash    Chantix [varenicline] Nausea And Vomiting       Past Medical History:   Diagnosis Date    Anemia, unspecified     Asthma     Chronic respiratory failure with hypoxia     COPD (chronic obstructive pulmonary disease)     Dysuria     Hypokalemia     Hypomagnesemia     Hyponatremia     Malnutrition     Sepsis, unspecified organism      Past Surgical History:   Procedure Laterality Date    ABDOMINAL SURGERY      CHOLECYSTECTOMY      COLECTOMY      12/2021    HYSTERECTOMY       Family History    None       Tobacco Use    Smoking status: Current Every Day Smoker     Packs/day: 0.50    Smokeless tobacco: Never Used   Substance and Sexual Activity    Alcohol use: Not Currently    Drug use: Yes     Types: Marijuana     Comment: x 5 days ago    Sexual activity: Not on file     Review of Systems   Unable to perform ROS: Intubated   Objective:     Vital Signs (Most Recent):  Temp: 99.1 °F (37.3 °C) (06/18/22  0701)  Pulse: 87 (06/18/22 1116)  Resp: 20 (06/18/22 1116)  BP: (!) 181/79 (06/18/22 1100)  SpO2: 98 % (06/18/22 1116)   Vital Signs (24h Range):  Temp:  [98.4 °F (36.9 °C)-99.8 °F (37.7 °C)] 99.1 °F (37.3 °C)  Pulse:  [] 87  Resp:  [15-41] 20  SpO2:  [63 %-100 %] 98 %  BP: ()/() 181/79  Arterial Line BP: ()/(-9-89) 104/56     Weight: 47.6 kg (104 lb 15 oz)  Body mass index is 18.01 kg/m².    Physical Exam  Vitals reviewed.   Constitutional:       Appearance: She is ill-appearing.   Cardiovascular:      Rate and Rhythm: Normal rate.      Pulses: Normal pulses.   Pulmonary:      Effort: Respiratory distress present.      Breath sounds: Wheezing present.   Abdominal:      General: Abdomen is flat. There is no distension.      Tenderness: There is no abdominal tenderness. There is no guarding.      Comments: Well healed scar.  Nondistended.  No guarding or rebound. No peritoneal signs   Neurological:      Mental Status: She is alert.   Psychiatric:         Mood and Affect: Mood normal.         Behavior: Behavior normal.       Significant Labs:  I have reviewed all pertinent lab results within the past 24 hours.  CBC:   Recent Labs   Lab 06/18/22  0304 06/18/22  0411   WBC 22.69*  --    RBC 3.38*  --    HGB 9.5*  --    HCT 30.8* 32*     --    MCV 91  --    MCH 28.1  --    MCHC 30.8*  --      BMP:   BMP  Lab Results   Component Value Date     (L) 06/18/2022    K 3.7 06/18/2022    CL 85 (L) 06/18/2022    CO2 40 (H) 06/18/2022    BUN 15 06/18/2022    CREATININE 0.5 06/18/2022    CALCIUM 7.8 (L) 06/18/2022    ANIONGAP 7 (L) 06/18/2022    ESTGFRAFRICA >60.0 06/18/2022    EGFRNONAA >60.0 06/18/2022       Significant Diagnostics:  Ct reviewed.  Large stool burden noted.  Much stool noted distal to anastomosis or staple line      Assessment/Plan:     Abdominal pain  This is chronic in nature.  Do not appreciate an acute surgical abnormality.  Will review ct with radiology.  Anticipate  recommending enema.  Cont to follow      VTE Risk Mitigation (From admission, onward)         Ordered     enoxaparin injection 40 mg  Daily         06/17/22 2317     Place sequential compression device  Until discontinued         06/17/22 2317     IP VTE HIGH RISK PATIENT  Once         06/17/22 2317     Reason for no Mechanical VTE Prophylaxis  Once        Question:  Reasons:  Answer:  Physician Provided (leave comment)    06/17/22 2317                Thank you for your consult. I will follow-up with patient. Please contact us if you have any additional questions.    Bill Graf MD  General Surgery  [facility]

## 2022-06-18 NOTE — ASSESSMENT & PLAN NOTE
CT abdomen and pelvis pending  Meropenem for now to cover for intra-abdominal process  Leukocytosis raises Specter of possible infection, however patient has had steroids in the past 3 days.  Would also cover for pneumonia, though procalcitonin negative.

## 2022-06-18 NOTE — HOSPITAL COURSE
Assumed care of this patient on 6/18/22.  Patient recently relocated to [place] to live with sister from [place].  Known history of COPD, chronic respiratory failure on 3 L home oxygen, continued tobacco use.  States in December needed emergent surgery, was taken to [facility], had abdominal resection, since then she is followed by surgery and Gastroenterology, chronic abdominal pain, intermittent worsening, loose stools and as per report seems she was placed on b.i.d. Questran.  Initially presented to the ED due to worsening abdominal pain.  Subsequently with worsening respiratory status, cough productive of greenish phlegm, cardiopulmonary arrest (reported torsades with VT after azithromycin and EKG with QT prolongation), status post ROSC, intubated and transferred to the ICU.  Chest x-ray with concern for right lung pneumonia, likely aspiration.  CT abdomen with concern for large stool burden large bowel and concern for partial colonic obstruction.  Patient is severely malnourished.  On 06/18 remains orally intubated but awake, alert, communicating via writing, copious amounts of phlegm production, denies any abdominal pain, states she is not passing flatus, no bowel movements today.  On 06/19, EKG with continued QTC prolongation, echo now with EF of 25% with grade 1 diastolic dysfunction, started on metoprolol, cardiology following.  Plan is attempted extubation to BiPAP.  PICC line when able to start TPN.    6/21/2022  Ms Cast is sitting up and feeding herself in a chair. She notes improved SOB but continued GONZALEZ. I want to have s decent life with the time I have left.     6/22/2022  Pt states that she is feeling better than yesterday with reduced sob, gonzalez and orthopnea. She has decided to be a hospice patient at home.    6/23/2022  Ms Cast has is depressed and I have spoken to her Re DNR status and no indication at this point for a life vest    6/24/2022  Ms Cast feels better on zoloft.  She has an improved appetite and mobility with reduced LAWSON. Pt unable to receive a trilogy vent at present.     6/25/2022  Pt is feeling much better and would like to go home on palliative care. Her BIPAP will be set up a 1 PM and she has 3 liters HOME 0 2  ROS: LAWSON otherwise negative X 9  PE in no distress HEENT MAC EOM intact moist mucus membranes Neck supple no use of accessory muscles Lungs no crackles wheezes or rhonchi Heart S 1 S 2 RRR no murmur Abdomen BS+ nontender Ext without CC or E pulses 1-2+ much diminished muscle mass Skin no acute finding Neuro no acute sensory or motor deficit

## 2022-06-18 NOTE — ASSESSMENT & PLAN NOTE
This is chronic in nature.  Do not appreciate an acute surgical abnormality.  Will review ct with radiology.  Anticipate recommending enema.  Cont to follow

## 2022-06-18 NOTE — HPI
56-year-old with past medical history significant for COPD, BMI 14.8, depression, generalized anxiety, DANIS, extensive smoking history presenting with worsening shortness breath.  At baseline she uses 3 L it was reported that her oxygen was in the low 80s.  Minimally productive cough.  Chronic diarrhea unchanged from baseline.  No fevers, chills, nausea, vomiting, sick contacts.  No chest pain.  Patient has history of chronic abdominal pain from bowel resection from severe sepsis episode in the past.  On presentation she had been complaining of some upper abdominal pain that was mild.  Family recently relocated from [place].    At time of my evaluation the patient had been intubated for worsening shortness of breath and acute respiratory decompensation with code.  Per ED physician verbal report, patient had gone into torsades and subsequently V-tach.  CPR, epinephrine, amiodarone, shock, and magnesium were given.  Patient responded and is now intubated.  Approximate time of code was less than 10 minutes.  She is currently on Levophed drip.  She is alert and able to give the thumbs-up sign.  My history is limited to report from emergency department.    ED lab significant for WBC 18, Na 134, CO2 48, creatinine 0.3, BNP 60.  Troponins negative.  Lactic acid negative.  Procalcitonin negative.    Chest x-ray significant for tiny interstitial opacity and right upper lobe and right lower lung base.  Lungs are hyperexpanded consistent with emphysema.    In the ED she received a dose of the azithromycin, ceftriaxone, and later meropenem after she was intubated.  She is also received a dose of Solu-Medrol and DuoNeb treatments.

## 2022-06-18 NOTE — PLAN OF CARE
Problem: Adjustment to Illness COPD (Chronic Obstructive Pulmonary Disease)  Goal: Optimal Chronic Illness Coping  Outcome: Ongoing, Progressing     Problem: Functional Ability Impaired COPD (Chronic Obstructive Pulmonary Disease)  Goal: Optimal Level of Functional [place]  Outcome: Ongoing, Progressing     Problem: Infection COPD (Chronic Obstructive Pulmonary Disease)  Goal: Absence of Infection Signs and Symptoms  Outcome: Ongoing, Progressing     Problem: Oral Intake Inadequate COPD (Chronic Obstructive Pulmonary Disease)  Goal: Improved Nutrition Intake  Outcome: Ongoing, Progressing     Problem: Respiratory Compromise COPD (Chronic Obstructive Pulmonary Disease)  Goal: Effective Oxygenation and Ventilation  Outcome: Ongoing, Progressing     Problem: Respiratory Compromise COPD (Chronic Obstructive Pulmonary Disease)  Goal: Effective Oxygenation and Ventilation  Outcome: Ongoing, Progressing     Problem: Adult Inpatient Plan of Care  Goal: Plan of Care Review  Outcome: Ongoing, Progressing  Goal: Patient-Specific Goal (Individualized)  Outcome: Ongoing, Progressing  Goal: Absence of Hospital-Acquired Illness or Injury  Outcome: Ongoing, Progressing  Goal: Optimal Comfort and Wellbeing  Outcome: Ongoing, Progressing  Goal: Readiness for Transition of Care  Outcome: Ongoing, Progressing     Problem: Infection  Goal: Absence of Infection Signs and Symptoms  Outcome: Ongoing, Progressing     Problem: Fluid Imbalance (Pneumonia)  Goal: Fluid Balance  Outcome: Ongoing, Progressing     Problem: Infection (Pneumonia)  Goal: Resolution of Infection Signs and Symptoms  Outcome: Ongoing, Progressing     Problem: Respiratory Compromise (Pneumonia)  Goal: Effective Oxygenation and Ventilation  Outcome: Ongoing, Progressing     Problem: Communication Impairment (Mechanical Ventilation, Invasive)  Goal: Effective Communication  Outcome: Ongoing, Progressing     Problem: Device-Related Complication Risk (Mechanical  Ventilation, Invasive)  Goal: Optimal Device Function  Outcome: Ongoing, Progressing     Problem: Inability to Wean (Mechanical Ventilation, Invasive)  Goal: Mechanical Ventilation Liberation  Outcome: Ongoing, Progressing     Problem: Nutrition Impairment (Mechanical Ventilation, Invasive)  Goal: Optimal Nutrition Delivery  Outcome: Ongoing, Progressing     Problem: Skin and Tissue Injury (Mechanical Ventilation, Invasive)  Goal: Absence of Device-Related Skin and Tissue Injury  Outcome: Ongoing, Progressing     Problem: Skin and Tissue Injury (Mechanical Ventilation, Invasive)  Goal: Absence of Device-Related Skin and Tissue Injury  Outcome: Ongoing, Progressing     Problem: Ventilator-Induced Lung Injury (Mechanical Ventilation, Invasive)  Goal: Absence of Ventilator-Induced Lung Injury  Outcome: Ongoing, Progressing     Problem: Device-Related Complication Risk (Artificial Airway)  Goal: Optimal Device Function  Outcome: Ongoing, Progressing     Problem: Skin and Tissue Injury (Artificial Airway)  Goal: Absence of Device-Related Skin or Tissue Injury  Outcome: Ongoing, Progressing     Problem: Noninvasive Ventilation Acute  Goal: Effective Unassisted Ventilation and Oxygenation  Outcome: Ongoing, Progressing     Problem: Skin Injury Risk Increased  Goal: Skin Health and Integrity  Outcome: Ongoing, Progressing

## 2022-06-18 NOTE — ASSESSMENT & PLAN NOTE
Tiny interstitial opacities in right upper lobe and right lower lobe.  Procalcitonin negative  No history of fevers.  Lower index of suspicion for bacterial pneumonia.  Patient received initial dose of ceftriaxone and azithromycin in the ED.  For antibiotic therapy, will continue meropenem for now to cover for both pneumonia and intra-abdominal process.  Would avoid azithromycin, Levaquin given reported history of torsade during code.  Check nasal MRSA swab

## 2022-06-18 NOTE — CARE UPDATE
06/18/22 0722   Patient Assessment/Suction   Level of Consciousness (AVPU) responds to voice   Respiratory Effort Normal;Mild   Expansion/Accessory Muscles/Retractions no use of accessory muscles   All Lung Fields Breath Sounds equal bilaterally   NINA Breath Sounds clear   LLL Breath Sounds diminished   RUL Breath Sounds coarse   RML Breath Sounds coarse   RLL Breath Sounds diminished   Rhythm/Pattern, Respiratory assisted mechanically   Cough Frequency with stimulation   Cough Type assisted   Suction Method tracheal;oral   Suction Pressure (mmHg) 120 mmHg   $ Suction Charges Inline Suction Procedure Stat Charge   Secretions Amount scant   Secretions Color tan   Secretions Characteristics thin   PRE-TX-O2   O2 Device (Oxygen Therapy) ventilator   $ Is the patient on Low Flow Oxygen? Yes   SpO2 95 %   Pulse Oximetry Type Continuous   $ Pulse Oximetry - Multiple Charge Pulse Oximetry - Multiple   Pulse 93   Resp 20   Aerosol Therapy   $ Aerosol Therapy Charges Aerosol Treatment   Daily Review of Necessity (SVN) completed   Respiratory Treatment Status (SVN) given   Treatment Route (SVN) in-line   Patient Position (SVN) HOB elevated   Post Treatment Assessment (SVN) breath sounds unchanged   Signs of Intolerance (SVN) none

## 2022-06-18 NOTE — CONSULTS
Replaced by [facility]  Department of Cardiology  Consult Note      PATIENT NAME: Estelle Cast    MRN: [id number]  TODAY'S DATE: 06/18/2022  ADMIT DATE: 6/17/2022                          CONSULT REQUESTED BY: Reyna Parada MD    SUBJECTIVE     PRINCIPAL PROBLEM: Chronic respiratory failure      REASON FOR CONSULT:    From H&P: 56-year-old with past medical history significant for COPD, BMI 14.8, depression, generalized anxiety, DANIS, extensive smoking history presenting with worsening shortness breath.  At baseline she uses 3 L it was reported that her oxygen was in the low 80s.  Minimally productive cough.  Chronic diarrhea unchanged from baseline.  No fevers, chills, nausea, vomiting, sick contacts.  No chest pain.  Patient has history of chronic abdominal pain from bowel resection from severe sepsis episode in the past.  On presentation she had been complaining of some upper abdominal pain that was mild.  Family recently relocated from [place].     At time of my evaluation the patient had been intubated for worsening shortness of breath and acute respiratory decompensation with code.  Per ED physician verbal report, patient had gone into torsades and subsequently V-tach.  CPR, epinephrine, amiodarone, shock, and magnesium were given.  Patient responded and is now intubated.  Approximate time of code was less than 10 minutes.  She is currently on Levophed drip.  She is alert and able to give the thumbs-up sign.  My history is limited to report from emergency department.     ED lab significant for WBC 18, Na 134, CO2 48, creatinine 0.3, BNP 60.  Troponins negative.  Lactic acid negative.  Procalcitonin negative.     Chest x-ray significant for tiny interstitial opacity and right upper lobe and right lower lung base.  Lungs are hyperexpanded consistent with emphysema.     In the ED she received a dose of the azithromycin, ceftriaxone, and later meropenem after she was intubated.  She is also received a  dose of Solu-Medrol and DuoNeb treatments.           No new subjective & objective note has been filed under this hospital service since the last note was generated.      HPI:    Patient is a 56-year-old female who presented to the emergency room with worsening shortness of breath.  Patient was noted to be hypoxic with O2 sat in the 80s.  In the emergency room the patient went into torsades and subsequently ventricular tachycardia according to H&P.  Patient was coded with ACLS protocol and achieved Rosc.  She was intubated and put in the ICU at which time she was placed on a Levophed drip.    Patient's niece was present at bedside this morning, but she was not aware of the patient's medical history other than that she had been having some abdominal pain at her home in [place] and decided to come over and stay with her sister.      Review of patient's allergies indicates:   Allergen Reactions    Celexa [citalopram]     Breztri aerosphere [budesonide-glycopyr-formoterol]     Pcn [penicillins]     Shellfish containing products     Advair diskus [fluticasone propion-salmeterol] Rash    Buspirone (bulk) Rash    Chantix [varenicline] Nausea And Vomiting       Past Medical History:   Diagnosis Date    Anemia, unspecified     Asthma     Chronic respiratory failure with hypoxia     COPD (chronic obstructive pulmonary disease)     Dysuria     Hypokalemia     Hypomagnesemia     Hyponatremia     Malnutrition     Sepsis, unspecified organism      Past Surgical History:   Procedure Laterality Date    ABDOMINAL SURGERY      CHOLECYSTECTOMY      COLECTOMY      12/2021    HYSTERECTOMY       Social History     Tobacco Use    Smoking status: Current Every Day Smoker     Packs/day: 0.50    Smokeless tobacco: Never Used   Substance Use Topics    Alcohol use: Not Currently    Drug use: Yes     Types: Marijuana     Comment: x 5 days ago        REVIEW OF SYSTEMS      OBJECTIVE     VITAL SIGNS (Most Recent)  Temp:  98.6 °F (37 °C) (06/18/22 1501)  Pulse: 76 (06/18/22 1730)  Resp: 20 (06/18/22 1730)  BP: (!) 152/71 (06/18/22 1730)  SpO2: 97 % (06/18/22 1730)    VENTILATION STATUS  Resp: 20 (06/18/22 1730)  SpO2: 97 % (06/18/22 1730)  Vent Mode: A/C  Oxygen Concentration (%):  [30-50] 35  Resp Rate Total:  [20 br/min-68 br/min] 20 br/min  Vt Set:  [380 mL-400 mL] 400 mL  PEEP/CPAP:  [5 cmH20] 5 cmH20  Pressure Support:  [0 cmH20] 0 cmH20  Mean Airway Pressure:  [10 ruT27-84 cmH20] 11 cmH20    I & O (Last 24H):    Intake/Output Summary (Last 24 hours) at 6/18/2022 1922  Last data filed at 6/18/2022 1738  Gross per 24 hour   Intake 4075.12 ml   Output 1155 ml   Net 2920.12 ml       WEIGHTS  Wt Readings from Last 1 Encounters:   06/17/22 2245 47.6 kg (104 lb 15 oz)   06/17/22 1100 39 kg (86 lb)       PHYSICAL EXAM  CONSTITUTIONAL: No fever, no chills; acutely ill female seen resting in bed. No distress noted.  HEENT: Normocephalic, atraumatic,pupils reactive to light                 NECK:  No JVD no carotid bruit  CVS: S1S2+, RRR  LUNGS: Clear  intubated  ABDOMEN: Soft, NT, BS+  EXTREMITIES: No cyanosis, edema  : + eugene catheter  NEURO: sedated      HOME MEDICATIONS:  No current facility-administered medications on file prior to encounter.     Current Outpatient Medications on File Prior to Encounter   Medication Sig Dispense Refill    acetaminophen (TYLENOL) 500 MG tablet Take 500 mg by mouth every 6 (six) hours as needed for Pain.      albuterol (ACCUNEB) 0.63 mg/3 mL Nebu Take 0.63 mg by nebulization every 6 (six) hours as needed. Rescue      ALPRAZolam (XANAX) 0.25 MG tablet Take by mouth 3 (three) times daily.      azelastine (ASTELIN) 137 mcg (0.1 %) nasal spray 1 spray by Nasal route 2 (two) times daily.      azithromycin (Z-CAMI) 250 MG tablet Take 500 mg by mouth once daily.      fluticasone furoate (ARNUITY ELLIPTA) 200 mcg/actuation inhaler Inhale 200 mcg into the lungs. Controller      guaiFENesin (MUCINEX) 600  mg 12 hr tablet Take 1,200 mg by mouth once daily.      ipratropium (ATROVENT HFA) 17 mcg/actuation inhaler Inhale 2 puffs into the lungs every 6 (six) hours. Rescue      ipratropium/albuterol sulfate (DUONEB INHL) Inhale into the lungs.      latanoprost, PF, 0.005 % Drop Apply to eye.      neomycin-polymyxin-hydrocortisone (CORTISPORIN) 3.5-10,000-10 mg-unit-mg/mL ophthalmic suspension 1 drop every 4 (four) hours.      nicotine (NICODERM CQ) 14 mg/24 hr Place 1 patch onto the skin every 24 hours.      pantoprazole (PROTONIX) 40 MG tablet Take 40 mg by mouth once daily.         SCHEDULED MEDS:   albuterol-ipratropium  3 mL Nebulization Q4H    enoxaparin  40 mg Subcutaneous Daily    famotidine (PF)  20 mg Intravenous BID    guaiFENesin  1,200 mg Oral Daily    latanoprost  1 drop Both Eyes QHS    meropenem (MERREM) IVPB  1 g Intravenous Q8H    methylPREDNISolone sodium succinate injection  60 mg Intravenous Q8H    nicotine  1 patch Transdermal Daily       CONTINUOUS INFUSIONS:   amino acids 4.25%-[id number] 75 mL/hr at 06/18/22 1440    fentanyl 80 mcg/hr (06/18/22 1400)       PRN MEDS:acetaminophen, albuterol-ipratropium, ALPRAZolam, calcium gluconate IVPB, calcium gluconate IVPB, calcium gluconate IVPB, dextrose 50%, dextrose 50%, glucagon (human recombinant), glucose, glucose, magnesium oxide, magnesium oxide, magnesium sulfate IVPB, magnesium sulfate IVPB, morphine, morphine, naloxone, ondansetron, polyethylene glycol, potassium bicarbonate, potassium bicarbonate, potassium bicarbonate, potassium chloride in water **AND** potassium chloride in water **AND** potassium chloride in water, prochlorperazine, sodium chloride 0.9%, sodium chloride 0.9%, sodium phosphate IVPB, sodium phosphate IVPB, sodium phosphate IVPB    LABS AND DIAGNOSTICS     CBC LAST 3 DAYS  Recent Labs   Lab 06/17/22  1307 06/18/22  0304 06/18/22  0411   WBC 17.99* 22.69*  --    RBC 3.88* 3.38*  --    HGB 11.0* 9.5*  --    HCT  37.2 30.8* 32*   MCV 96 91  --    MCH 28.4 28.1  --    MCHC 29.6* 30.8*  --    RDW 15.4* 15.5*  --     216  --    MPV 8.4* 8.6*  --    GRAN 91.0*  16.4* 90.6*  20.5*  --    LYMPH 4.4*  0.8* 5.6*  1.3  --    MONO 3.6*  0.6 3.1*  0.7  --    BASO 0.03 0.01  --    NRBC 0 0  --        COAGULATION LAST 3 DAYS  No results for input(s): LABPT, INR, APTT in the last 168 hours.    CHEMISTRY LAST 3 DAYS  Recent Labs   Lab 06/17/22  1307 06/17/22  1507 06/18/22  0059 06/18/22  0304 06/18/22  0411 06/18/22  0524   *  --   --  132*  --   --    K 4.3  --   --  3.7  --   --    CL 80*  --   --  85*  --   --    CO2 48*  --   --  40*  --   --    ANIONGAP 6*  --   --  7*  --   --    BUN 10  --   --  15  --   --    CREATININE 0.3*  --   --  0.5  --   --    GLU 82  --   --  101  --   --    CALCIUM 8.5*  --   --  7.8*  --   --    PH  --    < > 7.495*  --  7.510* 7.530*   MG 1.6  --   --  2.2  --   --    ALBUMIN 3.6  --   --   --   --   --    PROT 6.9  --   --   --   --   --    ALKPHOS 52*  --   --   --   --   --    ALT 10  --   --   --   --   --    AST 15  --   --   --   --   --    BILITOT 1.0  --   --   --   --   --     < > = values in this interval not displayed.       CARDIAC PROFILE LAST 3 DAYS  Recent Labs   Lab 06/17/22  1307   BNP 60   TROPONINI <0.030       ENDOCRINE LAST 3 DAYS  Recent Labs   Lab 06/17/22  1307   PROCAL 0.05       LAST ARTERIAL BLOOD GAS  ABG  Recent Labs   Lab 06/18/22  0524   PH 7.530*   PO2 112*   PCO2 59.9*   HCO3 50.0*   BE 27       LAST 7 DAYS MICROBIOLOGY   Microbiology Results (last 7 days)     Procedure Component Value Units Date/Time    Culture, Respiratory with Gram Stain [[id number]] Collected: 06/18/22 1535    Order Status: Completed Specimen: Respiratory from Tracheal Aspirate Updated: 06/18/22 1634     Gram Stain (Respiratory) Many WBC's     Gram Stain (Respiratory) <10 epithelial cells per low power field.     Gram Stain (Respiratory) No organisms seen    Blood culture #1  **CANNOT BE ORDERED STAT** [[id number]] Collected: 06/17/22 1307    Order Status: Completed Specimen: Blood from Peripheral, Antecubital, Left Updated: 06/18/22 1432     Blood Culture, Routine No Growth to date      No Growth to date    Blood culture #2 **CANNOT BE ORDERED STAT** [[id number]] Collected: 06/17/22 1320    Order Status: Completed Specimen: Blood from Peripheral, Antecubital, Right Updated: 06/18/22 1432     Blood Culture, Routine No Growth to date      No Growth to date    MRSA Screen by PCR [[id number]] Collected: 06/18/22 0121    Order Status: Completed Specimen: Nasopharyngeal Swab from Nasal Updated: 06/18/22 0358     MRSA SCREEN BY PCR Negative          MOST RECENT IMAGING  Echo  · The left ventricle is normal in size with moderate concentric   hypertrophy and severely decreased systolic function.  · Grade I left ventricular diastolic dysfunction.  · Moderate right ventricular enlargement with mildly reduced right   ventricular systolic function.  · The estimated ejection fraction is 25%.  · Mechanically ventilated; cannot use inferior caval vein diameter to   estimate central venous pressure.  · Mild tricuspid regurgitation.  · Trivial pericardial effusion.     X-Ray Chest AP Portable  HISTORY: intubated    FINDINGS: Portable chest radiograph at 0823 hours compared to 06/17/2022 shows ET and NG tubes, and right internal jugular central venous catheter, all unchanged in position. The cardiomediastinal silhouette and pulmonary vasculature are stable.    The lungs are hyperexpanded, with localized reticulonodular and groundglass opacities in the right upper lobe, unchanged. No new pleural or parenchymal abnormality.    IMPRESSION: No significant interval change.    Electronically signed by:  Hunter Rothman MD  6/18/2022 8:47 AM CDT Workstation: [id number]  X-Ray Chest AP Portable  HISTORY: OG tube re-verification    FINDINGS: Portable chest radiograph at 2325 hours compared to 1846 hours shows ET tube  and right internal jugular central venous catheter unchanged in position. The nasogastric tube as been retracted in the interval, with the distal tip now at the level of the gastroesophageal junction, and proximal port overlying the lower thoracic esophagus.    The cardiomediastinal silhouette and pulmonary vasculature are stable and within normal limits. The lungs are hyperexpanded, with localized reticulonodular and groundglass opacities in the right upper lobe, unchanged. There is no new pleural or parenchymal abnormality.    IMPRESSION: Nasogastric tube as described.    Electronically signed by:  Hunter Rothman MD  6/18/2022 7:19 AM CDT Workstation: [id number]      ECHOCARDIOGRAM RESULTS (last 5)  No results found for this or any previous visit.    Summary    · The left ventricle is normal in size with moderate concentric hypertrophy and severely decreased systolic function.  · Grade I left ventricular diastolic dysfunction.  · Moderate right ventricular enlargement with mildly reduced right ventricular systolic function.  · The estimated ejection fraction is 25%.  · Mechanically ventilated; cannot use inferior caval vein diameter to estimate central venous pressure.  · Mild tricuspid regurgitation.  · Trivial pericardial effusion.          CURRENT/PREVIOUS VISIT EKG  Results for orders placed or performed during the hospital encounter of 06/17/22   EKG 12-lead    Collection Time: 06/18/22  2:58 PM    Narrative    Test Reason : I47.2,    Vent. Rate : 083 BPM     Atrial Rate : 083 BPM     P-R Int : 128 ms          QRS Dur : 078 ms      QT Int : 474 ms       P-R-T Axes : 063 -13 090 degrees     QTc Int : 556 ms    Normal sinus rhythm  Low voltage QRS  Septal infarct ,age undetermined  Abnormal ECG  When compared with ECG of 17-JUN-2022 12:39,  Premature ventricular complexes are no longer Present  T wave inversion more evident in Anterior-lateral leads    Referred By: [email or web address]   SELF           Confirmed By:             ASSESSMENT/PLAN:     Active Hospital Problems    Diagnosis    *Acute on chronic heart failure requiring mechanical ventilation    VT (ventricular tachycardia)    BALDEMAR (generalized anxiety disorder)    Severe malnutrition    COPD (chronic obstructive pulmonary disease)    Leucocytosis    Anemia    Hyponatremia    Acute hypercapnic respiratory failure    Centriacinar emphysema    VT/torsades cardiac arrest with ROSC     Patient coded in emergency department  Intubated  Admit to ICU  Consult to Critical Care      Right lung pneumonia, likely aspiration    Smoker    Abdominal pain    BMI less than 19,adult    Obstructive sleep apnea syndrome       ASSESSMENT & PLAN:     1. Cardiac arrest  2. Torsades/VT  3. Acute hypoxic respiratory failure  4. Malnutrition  5. Right lung pneumonia  6. Smoker  7. COPD  8. DANIS  9. Hypomagnesemia on arrival      RECOMMENDATIONS:    1. 2D echocardiogram pending.   Request previous cards records if any exist.  2. Continue to check and replace potassium and magnesium. Goal for potassium is 4.0, and goal for magnesium is 2.0.   3. Respiratory management per pulmonary.   4. Further recommendations based on hospital course. Thank you for the consultation.         Albino Morales MD  [facility]  Department of Cardiology  Date of Service: 06/18/2022  2:44 PM    I have seen the patient, reviewed the Nurse Practitioner's history and physical, assessment, plan, progress note and consultation note. I have personally interviewed and examined the patient at bedside and agree with the findings.     PATIENT 56-YEAR-OLD FEMALE COPD VISITING FROM [place] ADMITTED WITH RIGHT LOWER LOBE PNEUMONIA MALNUTRITION, POSSIBLE BOWEL OBSTRUCTION.  SHE RECEIVES SOME ERYTHROMYCIN IV IN THE EMERGENCY ROOM AND WAS INTUBATED.  EKG SHOWED PVCS.  SHE SUBSEQUENTLY HAD TORSADE DE POINTE.  HER EKG SHOWS INCREASE QTC INTERVAL  INTERVAL.  OLD ANTERIOR MI IS PRESENT WITH LOW QRS  VOLTAGE.  SHE HAS HAD NO FURTHER A ARRHYTHMIAS    ECHO REVEALS EF OF 25% AND APICAL WALL MOTION DEFECT CONSISTENT WITH PREVIOUS MYOCARDIAL INFARCTION.  FOLLOW EKG FOR QTC PROLONGATION.  AVOID ERYTHROMYCIN OR ANY QT PROLONGING DRUGS AVOID ZOFRAN.  BETA-BLOCKER AS TOLERATED.  SHE HAS EVIDENCE OF OLD ANTERIOR MI WITH DECREASED EJECTION FRACTION WHICH COULD BE TACO SUB WAS SYNDROME OR OLD MI TRY TO OBTAIN RECORDS.  CARDIAC CATHETERIZATION WHEN STABLE.    Albino Morales MD  Department of Cardiology  [facility]

## 2022-06-18 NOTE — CARE UPDATE
06/18/22 1500   Patient Assessment/Suction   Rhythm/Pattern, Respiratory assisted mechanically   Cough Frequency with stimulation   Cough Type assisted   Suction Method oral;tracheal   Suction Pressure (mmHg) 120 mmHg   $ Suction Charges Inline Suction Procedure Stat Charge   Secretions Amount moderate   Secretions Color tan   Secretions Characteristics thick   Sputum Collection sample obtained per suctioning   $ Swab or suction? Suction   Aspirate Toleration MAXIME (no adverse reactions)   PRE-TX-O2   Oxygen Concentration (%) 35   SpO2 100 %   Pulse 91   Resp (!) 62   Vital Capacity   Vital Capacity (mL) 0   Vent Select   Charged w/in last 24h YES   Preset Conventional Ventilator Settings   Vent ID [id number]   Vent Type    Ventilation Type VC   Vent Mode A/C   Humidity HME   Set Rate 20 BPM   Vt Set 400 mL   PEEP/CPAP 5 cmH20   Peak Flow 50 L/min   Peak End Inspiratory Pressure 23 cmH20   I-Trigger Type  V-TRIG   Trigger Sensitivity Flow/I-Trigger 3 L/min   Patient Ventilator Parameters   Resp Rate Total 21 br/min   Peak Airway Pressure 25 cmH2O   Mean Airway Pressure 11 cmH20   Plateau Pressure 0 cmH20   Exhaled Vt 369 mL   Total Ve 8.43 mL   I:E Ratio Measured 1:2.00   Auto PEEP 0 cmH20   Conventional Ventilator Alarms   Resp Rate High Alarm 50 br/min   Press High Alarm 60 cmH2O   Apnea Rate 30   Apnea Volume (mL) 0 mL   Apnea Oxygen Concentration  100   Apnea Flow Rate (L/min) 60   T Apnea 20 sec(s)   Ready to Wean/Extubation Screen   FIO2<=50 (chart decimal) 0.35   MV<16L (chart vol.) 8.43   PEEP <=8 (chart #) 5   Ready to Wean Parameters   F/VT Ratio<105 (RSBI) 168.02

## 2022-06-18 NOTE — H&P
[facility] - Emergency Dept  Hospital Medicine  History & Physical    Patient Name: Estelle Cast  MRN: [id number]  Patient Class: IP- Inpatient  Admission Date: 6/17/2022  Attending Physician: Juan M Godwin MD  Primary Care Provider: Primary Doctor No         Patient information was obtained from patient and ER records.     Subjective:     Principal Problem:<principal problem not specified>    Chief Complaint:   Chief Complaint   Patient presents with    Cough    Shortness of Breath     Cough and SOB x 3 days         HPI: 56-year-old with past medical history significant for COPD, BMI 14.8, depression, generalized anxiety, DANIS, extensive smoking history presenting with worsening shortness breath.  At baseline she uses 3 L it was reported that her oxygen was in the low 80s.  Minimally productive cough.  Chronic diarrhea unchanged from baseline.  No fevers, chills, nausea, vomiting, sick contacts.  No chest pain.  Patient has history of chronic abdominal pain from bowel resection from severe sepsis episode in the past.  On presentation she had been complaining of some upper abdominal pain that was mild.  Family recently relocated from [place].    At time of my evaluation the patient had been intubated for worsening shortness of breath and acute respiratory decompensation with code.  Per ED physician verbal report, patient had gone into torsades and subsequently V-tach.  CPR, epinephrine, amiodarone, shock, and magnesium were given.  Patient responded and is now intubated.  Approximate time of code was less than 10 minutes.  She is currently on Levophed drip.  She is alert and able to give the thumbs-up sign.  My history is limited to report from emergency department.    ED lab significant for WBC 18, Na 134, CO2 48, creatinine 0.3, BNP 60.  Troponins negative.  Lactic acid negative.  Procalcitonin negative.    Chest x-ray significant for tiny interstitial opacity and right upper lobe and right  lower lung base.  Lungs are hyperexpanded consistent with emphysema.    In the ED she received a dose of the azithromycin, ceftriaxone, and later meropenem after she was intubated.  She is also received a dose of Solu-Medrol and DuoNeb treatments.        No new subjective & objective note has been filed under this hospital service since the last note was generated.    Assessment/Plan:     Cardiac arrest  See ED note for details      Smoker  Recommend smoking cessation  Nicotine patch for now      Pneumonia  Tiny interstitial opacities in right upper lobe and right lower lobe.  Procalcitonin negative  No history of fevers.  Lower index of suspicion for bacterial pneumonia.  Patient received initial dose of ceftriaxone and azithromycin in the ED.  For antibiotic therapy, will continue meropenem for now to cover for both pneumonia and intra-abdominal process.  Would avoid azithromycin, Levaquin given reported history of torsade during code.  Check nasal MRSA swab      Obstructive sleep apnea syndrome  Comorbidity      Chronic respiratory failure  Patient with Hypercapnic and Hypoxic Respiratory failure which is Acute on chronic.  she is on home oxygen at 3 LPM.   Signs/symptoms of respiratory failure include- increased work of breathing, respiratory distress and wheezing. Contributing diagnoses includes - COPD and Pneumonia Labs and images were reviewed. Patient Has not had a recent ABG. Will treat underlying causes and adjust management of respiratory failure as follows- intubation, steroids, antibiotics, DuoNebs    Centriacinar emphysema  Nebulizer treatments q.6  Solu-Medrol  Intubated  Management by pulmonology        Abdominal pain  CT abdomen and pelvis pending  Meropenem for now to cover for intra-abdominal process  Leukocytosis raises Specter of possible infection, however patient has had steroids in the past 3 days.  Would also cover for pneumonia, though procalcitonin negative.      BMI less than  19,adult  Consult nutrition        VTE Risk Mitigation (From admission, onward)    None             Juan M Godwin MD  Department of Hospital Medicine   [facility] - Emergency Dept

## 2022-06-18 NOTE — PROGRESS NOTES
[facility]  Progress Note    Patient Name: Estelle Cast  MRN: [id number]  Patient Class: IP- Inpatient   Admission Date: 6/17/2022  Length of Stay: 1 days  Attending Physician: Reyna Parada MD  Primary Care Provider: Primary Doctor No        Subjective:     Principal Problem:Chronic respiratory failure        HPI:  56-year-old with past medical history significant for COPD, BMI 14.8, depression, generalized anxiety, DANIS, extensive smoking history presenting with worsening shortness breath.  At baseline she uses 3 L it was reported that her oxygen was in the low 80s.  Minimally productive cough.  Chronic diarrhea unchanged from baseline.  No fevers, chills, nausea, vomiting, sick contacts.  No chest pain.  Patient has history of chronic abdominal pain from bowel resection from severe sepsis episode in the past.  On presentation she had been complaining of some upper abdominal pain that was mild.  Family recently relocated from [place].    At time of my evaluation the patient had been intubated for worsening shortness of breath and acute respiratory decompensation with code.  Per ED physician verbal report, patient had gone into torsades and subsequently V-tach.  CPR, epinephrine, amiodarone, shock, and magnesium were given.  Patient responded and is now intubated.  Approximate time of code was less than 10 minutes.  She is currently on Levophed drip.  She is alert and able to give the thumbs-up sign.  My history is limited to report from emergency department.    ED lab significant for WBC 18, Na 134, CO2 48, creatinine 0.3, BNP 60.  Troponins negative.  Lactic acid negative.  Procalcitonin negative.    Chest x-ray significant for tiny interstitial opacity and right upper lobe and right lower lung base.  Lungs are hyperexpanded consistent with emphysema.    In the ED she received a dose of the azithromycin, ceftriaxone, and later meropenem after she was intubated.  She is  also received a dose of Solu-Medrol and DuoNeb treatments.        Overview/Hospital Course:  Assumed care of this patient on 6/18/22.  Patient recently relocated to [place] to live with sister from [place].  Known history of COPD, chronic respiratory failure on 3 L home oxygen, continued tobacco use.  States in December needed emergent surgery, was taken to [facility], had abdominal resection, since then she is followed by surgery and Gastroenterology, chronic abdominal pain, intermittent worsening, loose stools and as per report seems she was placed on b.i.d. Questran.  Initially presented to the ED due to worsening abdominal pain.  Subsequently with worsening respiratory status, cough productive of greenish phlegm, cardiopulmonary arrest (reported torsades with VT), status post ROSC, intubated and transferred to the ICU.  Chest x-ray with concern for right lung pneumonia, likely aspiration.  CT abdomen with concern for large stool burden large bowel and concern for partial colonic obstruction.  Patient is severely malnourished.  On 06/18 remains orally intubated but awake, alert, communicating via writing, copious amounts of phlegm production, denies any abdominal pain, states she is not passing flatus, no bowel movements today.      Interval History:  See hospital course.  Collateral obtained from sister over the phone.  Patient is also writing and giving thumbs up.  In December reportedly had emergent bowel surgery in [facility], bowel resection, since then has had a chronic abdominal pain, intermittent episodes of worsening, loose stool followed by gastroenterology and apparently on b.i.d. Questran.  Chronic respiratory failure, 3 L oxygen, producing greenish phlegm.  Denies any abdominal pain today.  Not on Levophed.  T-max 98.8°.  Orally intubated on ventilator FiO2 35 with 5 of PEEP.  WBC 22, hemoglobin 9.5, potassium 3.7, magnesium 2.2, troponin negative, lactic acid 0.8, procalcitonin 0.05.   Chest x-ray with right lung pneumonia.  CT with concern for partial large bowel obstruction.  Discussed with patient.  Discussed with family members at bedside.  Discussed with nursing.    Review of Systems   Constitutional:  Positive for appetite change and fatigue. Negative for fever.   Respiratory:  Positive for cough.    Gastrointestinal:  Negative for abdominal pain.   Neurological:  Positive for weakness.   Psychiatric/Behavioral:  Negative for confusion.    Objective:     Vital Signs (Most Recent):  Temp: 99.1 °F (37.3 °C) (06/18/22 0701)  Pulse: 87 (06/18/22 1116)  Resp: 20 (06/18/22 1116)  BP: (!) 181/79 (06/18/22 1100)  SpO2: 98 % (06/18/22 1116)   Vital Signs (24h Range):  Temp:  [98.4 °F (36.9 °C)-99.8 °F (37.7 °C)] 99.1 °F (37.3 °C)  Pulse:  [] 87  Resp:  [15-41] 20  SpO2:  [63 %-100 %] 98 %  BP: ()/() 181/79  Arterial Line BP: ()/(-9-89) 104/56     Weight: 47.6 kg (104 lb 15 oz)  Body mass index is 18.01 kg/m².    Intake/Output Summary (Last 24 hours) at 6/18/2022 1418  Last data filed at 6/18/2022 1118  Gross per 24 hour   Intake 4660.28 ml   Output 795 ml   Net 3865.28 ml      Physical Exam  Vitals and nursing note reviewed.   Constitutional:       Comments: Critically and chronically ill-appearing   HENT:      Head: Normocephalic and atraumatic.   Cardiovascular:      Rate and Rhythm: Normal rate and regular rhythm.   Pulmonary:      Comments: Orally intubated on mechanical ventilation FiO2 of 35 with 5 of PEEP  Abdominal:      Comments: Mildly distended but soft, bowel sounds heard right lower abdomen, nontender   Genitourinary:     Comments: Wong catheter in place with yellow urine draining  Musculoskeletal:      Cervical back: Neck supple.      Comments: Evidence of severe muscle wasting, cachexia with low BMI   Skin:     Comments: Bruising upper extremities, healed lesions lower extremity.  Right radial arterial line with some oozing noted   Neurological:      Mental  Status: She is alert and oriented to person, place, and time.      Comments: On fentanyl but alert, oriented, communicating via written       Significant Labs: BMP:   Recent Labs   Lab 06/18/22  0304      *   K 3.7   CL 85*   CO2 40*   BUN 15   CREATININE 0.5   CALCIUM 7.8*   MG 2.2     CBC:   Recent Labs   Lab 06/17/22  1307 06/18/22  0304 06/18/22  0411   WBC 17.99* 22.69*  --    HGB 11.0* 9.5*  --    HCT 37.2 30.8* 32*    216  --      CMP:   Recent Labs   Lab 06/17/22  1307 06/18/22  0304   * 132*   K 4.3 3.7   CL 80* 85*   CO2 48* 40*   GLU 82 101   BUN 10 15   CREATININE 0.3* 0.5   CALCIUM 8.5* 7.8*   PROT 6.9  --    ALBUMIN 3.6  --    BILITOT 1.0  --    ALKPHOS 52*  --    AST 15  --    ALT 10  --    ANIONGAP 6* 7*   EGFRNONAA >60.0 >60.0     Cardiac Markers:   Recent Labs   Lab 06/17/22  1307   BNP 60     Lactic Acid:   Recent Labs   Lab 06/17/22  1307   LACTATE 0.8     Magnesium:   Recent Labs   Lab 06/17/22  1307 06/18/22  0304   MG 1.6 2.2     POCT Glucose: No results for input(s): POCTGLUCOSE in the last 48 hours.    Significant Imaging: I have reviewed and interpreted all pertinent imaging results/findings within the past 24 hours.      Assessment/Plan:      Active Hospital Problems    Diagnosis    *Acute on chronic heart failure requiring mechanical ventilation    VT (ventricular tachycardia)    VT/torsades cardiac arrest with ROSC     Patient coded in emergency department  Intubated  Admit to ICU  Consult to Critical Care      Abdominal pain    BALDEMAR (generalized anxiety disorder)    Severe malnutrition    COPD (chronic obstructive pulmonary disease)    Leucocytosis    Anemia    Hyponatremia    Centriacinar emphysema    Right lung pneumonia, likely aspiration    Smoker    BMI less than 19,adult    Obstructive sleep apnea syndrome     Plan:  Continue care in ICU with continuous cardiac telemetry monitoring  Continue oral intubation with mechanical ventilation, D for  extubation to pulmonary  Ventilator precautions  Start famotidine for GI prophylaxis  P.r.n. ABG and chest x-ray, repeat x-ray in a.m.  Respiratory sample from tracheal aspirate for Gram stain and culture  Continue meropenem, deescalate as able  Continue breathing treatments, decreased IV steroids 60 Q 8 and monitoring  Change IV fluids to Clinimix, will consult for PICC line, once in place will start TPN due to severe malnutrition  Continue fentanyl, resume p.r.n. alprazolam, titrate sedation as needed  Keep potassium greater than 4, magnesium greater than 2, avoid QTC prolonging medications especially with torsades  Complete echocardiogram ordered  Check nutritional labs  Electrolytes sliding scale repletion  A.m. labs ordered  Pulmonary/critical Care consulted  General surgery consulted  Cardiology consulted  Further plan as per clinical course      VTE Risk Mitigation (From admission, onward)         Ordered     enoxaparin injection 40 mg  Daily         06/17/22 2317     Place sequential compression device  Until discontinued         06/17/22 2317     IP VTE HIGH RISK PATIENT  Once         06/17/22 2317     Reason for no Mechanical VTE Prophylaxis  Once        Question:  Reasons:  Answer:  Physician Provided (leave comment)    06/17/22 2317                Discharge Planning   LIZANDRO:      Code Status: Full Code   Is the patient medically ready for discharge?:     Reason for patient still in hospital (select all that apply): Patient trending condition                     Reyna Parada MD  Department of Hospital Medicine   [facility]

## 2022-06-18 NOTE — ASSESSMENT & PLAN NOTE
Patient with Hypercapnic and Hypoxic Respiratory failure which is Acute on chronic.  she is on home oxygen at 3 LPM.   Signs/symptoms of respiratory failure include- increased work of breathing, respiratory distress and wheezing. Contributing diagnoses includes - COPD and Pneumonia Labs and images were reviewed. Patient Has not had a recent ABG. Will treat underlying causes and adjust management of respiratory failure as follows- intubation, steroids, antibiotics, DuoNebs

## 2022-06-19 PROBLEM — K56.609 BOWEL OBSTRUCTION: Status: ACTIVE | Noted: 2022-06-19

## 2022-06-19 PROBLEM — I42.9 CARDIOMYOPATHY: Chronic | Status: ACTIVE | Noted: 2022-06-19

## 2022-06-19 PROBLEM — D72.829 LEUCOCYTOSIS: Status: RESOLVED | Noted: 2022-06-18 | Resolved: 2022-06-19

## 2022-06-19 PROBLEM — R94.31 QT PROLONGATION: Chronic | Status: ACTIVE | Noted: 2022-06-19

## 2022-06-19 LAB
ALBUMIN SERPL BCP-MCNC: 2.6 G/DL (ref 3.5–5.2)
ALLENS TEST: ABNORMAL
ALP SERPL-CCNC: 48 U/L (ref 55–135)
ALT SERPL W/O P-5'-P-CCNC: 25 U/L (ref 10–44)
ANION GAP SERPL CALC-SCNC: 6 MMOL/L (ref 8–16)
AST SERPL-CCNC: 27 U/L (ref 10–40)
BASOPHILS # BLD AUTO: 0 K/UL (ref 0–0.2)
BASOPHILS NFR BLD: 0 % (ref 0–1.9)
BILIRUB SERPL-MCNC: 0.6 MG/DL (ref 0.1–1)
BUN SERPL-MCNC: 20 MG/DL (ref 6–20)
CALCIUM SERPL-MCNC: 8.2 MG/DL (ref 8.7–10.5)
CHLORIDE SERPL-SCNC: 87 MMOL/L (ref 95–110)
CO2 SERPL-SCNC: 39 MMOL/L (ref 23–29)
CREAT SERPL-MCNC: 0.3 MG/DL (ref 0.5–1.4)
DELSYS: ABNORMAL
DIFFERENTIAL METHOD: ABNORMAL
EOSINOPHIL # BLD AUTO: 0 K/UL (ref 0–0.5)
EOSINOPHIL NFR BLD: 0 % (ref 0–8)
ERYTHROCYTE [DISTWIDTH] IN BLOOD BY AUTOMATED COUNT: 15.9 % (ref 11.5–14.5)
EST. GFR  (AFRICAN AMERICAN): >60 ML/MIN/1.73 M^2
EST. GFR  (NON AFRICAN AMERICAN): >60 ML/MIN/1.73 M^2
FIO2: 40
GLUCOSE SERPL-MCNC: 115 MG/DL (ref 70–110)
GLUCOSE SERPL-MCNC: 118 MG/DL (ref 70–110)
HCO3 UR-SCNC: 44.5 MMOL/L (ref 24–28)
HCT VFR BLD AUTO: 29.4 % (ref 37–48.5)
HCT VFR BLD CALC: 31 %PCV (ref 36–54)
HGB BLD-MCNC: 9.2 G/DL (ref 12–16)
IMM GRANULOCYTES # BLD AUTO: 0.07 K/UL (ref 0–0.04)
IMM GRANULOCYTES NFR BLD AUTO: 0.6 % (ref 0–0.5)
INR PPP: 1
LYMPHOCYTES # BLD AUTO: 0.5 K/UL (ref 1–4.8)
LYMPHOCYTES NFR BLD: 4.3 % (ref 18–48)
MAGNESIUM SERPL-MCNC: 1.8 MG/DL (ref 1.6–2.6)
MAGNESIUM SERPL-MCNC: 2.1 MG/DL (ref 1.6–2.6)
MCH RBC QN AUTO: 28 PG (ref 27–31)
MCHC RBC AUTO-ENTMCNC: 31.3 G/DL (ref 32–36)
MCV RBC AUTO: 90 FL (ref 82–98)
MIN VOL: 7.7
MODE: ABNORMAL
MONOCYTES # BLD AUTO: 0.2 K/UL (ref 0.3–1)
MONOCYTES NFR BLD: 2 % (ref 4–15)
NEUTROPHILS # BLD AUTO: 11.3 K/UL (ref 1.8–7.7)
NEUTROPHILS NFR BLD: 93.1 % (ref 38–73)
NRBC BLD-RTO: 0 /100 WBC
PCO2 BLDA: 72.1 MMHG (ref 35–45)
PEEP: 5
PH SMN: 7.4 [PH] (ref 7.35–7.45)
PHOSPHATE SERPL-MCNC: 3.4 MG/DL (ref 2.7–4.5)
PLATELET # BLD AUTO: 190 K/UL (ref 150–450)
PMV BLD AUTO: 8.5 FL (ref 9.2–12.9)
PO2 BLDA: 80 MMHG (ref 80–100)
POC BE: 20 MMOL/L
POC IONIZED CALCIUM: 1.19 MMOL/L (ref 1.06–1.42)
POC SATURATED O2: 95 % (ref 95–100)
POC TCO2: 47 MMOL/L (ref 23–27)
POTASSIUM BLD-SCNC: 4 MMOL/L (ref 3.5–5.1)
POTASSIUM SERPL-SCNC: 4 MMOL/L (ref 3.5–5.1)
PROT SERPL-MCNC: 5.5 G/DL (ref 6–8.4)
PROTHROMBIN TIME: 12.7 SEC (ref 11.4–13.7)
RBC # BLD AUTO: 3.28 M/UL (ref 4–5.4)
SAMPLE: ABNORMAL
SITE: ABNORMAL
SODIUM BLD-SCNC: 132 MMOL/L (ref 136–145)
SODIUM SERPL-SCNC: 132 MMOL/L (ref 136–145)
SP02: 97
SPONT RATE: 15
WBC # BLD AUTO: 12.12 K/UL (ref 3.9–12.7)

## 2022-06-19 PROCEDURE — 63600175 PHARM REV CODE 636 W HCPCS: Performed by: FAMILY MEDICINE

## 2022-06-19 PROCEDURE — 82330 ASSAY OF CALCIUM: CPT

## 2022-06-19 PROCEDURE — 27000221 HC OXYGEN, UP TO 24 HOURS

## 2022-06-19 PROCEDURE — 25000003 PHARM REV CODE 250: Performed by: FAMILY MEDICINE

## 2022-06-19 PROCEDURE — 20000000 HC ICU ROOM

## 2022-06-19 PROCEDURE — 99291 PR CRITICAL CARE, E/M 30-74 MINUTES: ICD-10-PCS | Mod: ,,, | Performed by: INTERNAL MEDICINE

## 2022-06-19 PROCEDURE — 99233 SBSQ HOSP IP/OBS HIGH 50: CPT | Mod: ,,, | Performed by: GENERAL PRACTICE

## 2022-06-19 PROCEDURE — 93010 ELECTROCARDIOGRAM REPORT: CPT | Mod: ,,, | Performed by: GENERAL PRACTICE

## 2022-06-19 PROCEDURE — 80053 COMPREHEN METABOLIC PANEL: CPT | Performed by: INTERNAL MEDICINE

## 2022-06-19 PROCEDURE — 99291 CRITICAL CARE FIRST HOUR: CPT | Mod: ,,, | Performed by: INTERNAL MEDICINE

## 2022-06-19 PROCEDURE — 84207 ASSAY OF VITAMIN B-6: CPT | Performed by: INTERNAL MEDICINE

## 2022-06-19 PROCEDURE — 82803 BLOOD GASES ANY COMBINATION: CPT

## 2022-06-19 PROCEDURE — 94660 CPAP INITIATION&MGMT: CPT

## 2022-06-19 PROCEDURE — 37799 UNLISTED PX VASCULAR SURGERY: CPT

## 2022-06-19 PROCEDURE — 63600175 PHARM REV CODE 636 W HCPCS: Performed by: INTERNAL MEDICINE

## 2022-06-19 PROCEDURE — 94003 VENT MGMT INPAT SUBQ DAY: CPT

## 2022-06-19 PROCEDURE — 99900031 HC PATIENT EDUCATION (STAT)

## 2022-06-19 PROCEDURE — 84132 ASSAY OF SERUM POTASSIUM: CPT

## 2022-06-19 PROCEDURE — 25000003 PHARM REV CODE 250: Performed by: SURGERY

## 2022-06-19 PROCEDURE — 83735 ASSAY OF MAGNESIUM: CPT | Mod: 91 | Performed by: INTERNAL MEDICINE

## 2022-06-19 PROCEDURE — 99233 PR SUBSEQUENT HOSPITAL CARE,LEVL III: ICD-10-PCS | Mod: ,,, | Performed by: GENERAL PRACTICE

## 2022-06-19 PROCEDURE — 25000003 PHARM REV CODE 250: Performed by: INTERNAL MEDICINE

## 2022-06-19 PROCEDURE — 25000242 PHARM REV CODE 250 ALT 637 W/ HCPCS: Performed by: FAMILY MEDICINE

## 2022-06-19 PROCEDURE — 84100 ASSAY OF PHOSPHORUS: CPT | Performed by: FAMILY MEDICINE

## 2022-06-19 PROCEDURE — 83735 ASSAY OF MAGNESIUM: CPT | Performed by: FAMILY MEDICINE

## 2022-06-19 PROCEDURE — 99900035 HC TECH TIME PER 15 MIN (STAT)

## 2022-06-19 PROCEDURE — 93005 ELECTROCARDIOGRAM TRACING: CPT | Performed by: GENERAL PRACTICE

## 2022-06-19 PROCEDURE — 99900026 HC AIRWAY MAINTENANCE (STAT)

## 2022-06-19 PROCEDURE — 84425 ASSAY OF VITAMIN B-1: CPT | Performed by: INTERNAL MEDICINE

## 2022-06-19 PROCEDURE — 85610 PROTHROMBIN TIME: CPT | Performed by: INTERNAL MEDICINE

## 2022-06-19 PROCEDURE — 25000003 PHARM REV CODE 250: Performed by: GENERAL PRACTICE

## 2022-06-19 PROCEDURE — 85014 HEMATOCRIT: CPT

## 2022-06-19 PROCEDURE — 93010 EKG 12-LEAD: ICD-10-PCS | Mod: ,,, | Performed by: GENERAL PRACTICE

## 2022-06-19 PROCEDURE — 84295 ASSAY OF SERUM SODIUM: CPT

## 2022-06-19 PROCEDURE — 85025 COMPLETE CBC W/AUTO DIFF WBC: CPT | Performed by: FAMILY MEDICINE

## 2022-06-19 PROCEDURE — 94640 AIRWAY INHALATION TREATMENT: CPT

## 2022-06-19 PROCEDURE — 94761 N-INVAS EAR/PLS OXIMETRY MLT: CPT

## 2022-06-19 RX ORDER — MORPHINE SULFATE 2 MG/ML
2 INJECTION, SOLUTION INTRAMUSCULAR; INTRAVENOUS EVERY 4 HOURS PRN
Status: DISCONTINUED | OUTPATIENT
Start: 2022-06-19 | End: 2022-06-20

## 2022-06-19 RX ADMIN — LEUCINE, PHENYLALANINE, LYSINE, METHIONINE, ISOLEUCINE, VALINE, HISTIDINE, THREONINE, TRYPTOPHAN, ALANINE, GLYCINE, ARGININE, PROLINE, SERINE, TYROSINE, SODIUM ACETATE, DIBASIC POTASSIUM PHOSPHATE, MAGNESIUM CHLORIDE, SODIUM CHLORIDE, CALCIUM CHLORIDE, DEXTROSE
311; 238; 247; 170; 255; 247; 204; 179; 77; 880; 438; 489; 289; 213; 17; 297; 261; 51; 77; 33; 5 INJECTION INTRAVENOUS at 06:06

## 2022-06-19 RX ADMIN — IPRATROPIUM BROMIDE AND ALBUTEROL SULFATE 3 ML: .5; 3 SOLUTION RESPIRATORY (INHALATION) at 07:06

## 2022-06-19 RX ADMIN — IPRATROPIUM BROMIDE AND ALBUTEROL SULFATE 3 ML: .5; 3 SOLUTION RESPIRATORY (INHALATION) at 03:06

## 2022-06-19 RX ADMIN — LATANOPROST 1 DROP: 50 SOLUTION OPHTHALMIC at 08:06

## 2022-06-19 RX ADMIN — IPRATROPIUM BROMIDE AND ALBUTEROL SULFATE 3 ML: .5; 3 SOLUTION RESPIRATORY (INHALATION) at 11:06

## 2022-06-19 RX ADMIN — FAMOTIDINE 20 MG: 10 INJECTION INTRAVENOUS at 08:06

## 2022-06-19 RX ADMIN — FENTANYL CITRATE 92.5 MCG/HR: 50 INJECTION INTRAVENOUS at 06:06

## 2022-06-19 RX ADMIN — LEUCINE, PHENYLALANINE, LYSINE, METHIONINE, ISOLEUCINE, VALINE, HISTIDINE, THREONINE, TRYPTOPHAN, ALANINE, GLYCINE, ARGININE, PROLINE, SERINE, TYROSINE, SODIUM ACETATE, DIBASIC POTASSIUM PHOSPHATE, MAGNESIUM CHLORIDE, SODIUM CHLORIDE, CALCIUM CHLORIDE, DEXTROSE
311; 238; 247; 170; 255; 247; 204; 179; 77; 880; 438; 489; 289; 213; 17; 297; 261; 51; 77; 33; 5 INJECTION INTRAVENOUS at 04:06

## 2022-06-19 RX ADMIN — ALPRAZOLAM 0.25 MG: 0.25 TABLET ORAL at 09:06

## 2022-06-19 RX ADMIN — MAGNESIUM SULFATE HEPTAHYDRATE 2 G: 40 INJECTION, SOLUTION INTRAVENOUS at 07:06

## 2022-06-19 RX ADMIN — FAMOTIDINE 20 MG: 10 INJECTION INTRAVENOUS at 09:06

## 2022-06-19 RX ADMIN — METHYLPREDNISOLONE SODIUM SUCCINATE 60 MG: 125 INJECTION, POWDER, FOR SOLUTION INTRAMUSCULAR; INTRAVENOUS at 05:06

## 2022-06-19 RX ADMIN — METOPROLOL TARTRATE 25 MG: 25 TABLET, FILM COATED ORAL at 09:06

## 2022-06-19 RX ADMIN — SODIUM PHOSPHATE 1 ENEMA: 7; 19 ENEMA RECTAL at 05:06

## 2022-06-19 RX ADMIN — ENOXAPARIN SODIUM 40 MG: 40 INJECTION SUBCUTANEOUS at 05:06

## 2022-06-19 RX ADMIN — MEROPENEM AND SODIUM CHLORIDE 1 G: 1 INJECTION, SOLUTION INTRAVENOUS at 08:06

## 2022-06-19 RX ADMIN — IPRATROPIUM BROMIDE AND ALBUTEROL SULFATE 3 ML: .5; 3 SOLUTION RESPIRATORY (INHALATION) at 04:06

## 2022-06-19 RX ADMIN — MEROPENEM AND SODIUM CHLORIDE 1 G: 1 INJECTION, SOLUTION INTRAVENOUS at 03:06

## 2022-06-19 RX ADMIN — MEROPENEM AND SODIUM CHLORIDE 1 G: 1 INJECTION, SOLUTION INTRAVENOUS at 04:06

## 2022-06-19 NOTE — ASSESSMENT & PLAN NOTE
Producing greenish phlegm.  Respiratory sample with no pathogens but radiological evidence of right lung pneumonia and suspect aspiration  Continue meropenem, adjust as needed

## 2022-06-19 NOTE — ASSESSMENT & PLAN NOTE
Treating for COPD exacerbation with right lung pneumonia  Continue scheduled DuoNebs, continue IV steroids and wean as able

## 2022-06-19 NOTE — ASSESSMENT & PLAN NOTE
QTC prolongation with torsades and ventricular tachycardia.  No further episodes.  Started on metoprolol.  Continuous cardiac monitoring

## 2022-06-19 NOTE — PLAN OF CARE
06/19/22 0717   Patient Assessment/Suction   Level of Consciousness (AVPU) alert   Respiratory Effort Unlabored   All Lung Fields Breath Sounds   (mild wheezing)   PRE-TX-O2   O2 Device (Oxygen Therapy) ventilator   Oxygen Concentration (%) 40   SpO2 95 %   Pulse Oximetry Type Continuous   Pulse 85   Resp 18   Aerosol Therapy   $ Aerosol Therapy Charges Aerosol Treatment   Respiratory Treatment Status (SVN) given   Treatment Route (SVN) in-line   Patient Position (SVN) HOB elevated   Post Treatment Assessment (SVN) breath sounds unchanged   Signs of Intolerance (SVN) none   Vital Capacity   Vital Capacity (mL) 0        Airway - Non-Surgical 06/17/22 1655 Endotracheal Tube   Placement Date/Time: 06/17/22 1655   Present Prior to Hospital Arrival?: No  Method of Intubation: Rapid Sequence Induction  Inserted by: MD  Staff/Resident Name(s): Dalton  Airway Device: Endotracheal Tube  Mask Ventilation: Easy  Blade: Wang #3...   Secured at 25 cm   Measured At Lips   Secured by Commercial tube christian   Bite Block none   Site Condition Cool;Dry   Status Intact;Secured   Site Assessment Clean;Dry   Cuff Pressure   (MLT)   Vent Select   Conventional Vent Y   $ Ventilator Subsequent 1   Charged w/in last 24h YES   Preset Conventional Ventilator Settings   Vent Type    Ventilation Type VC   Vent Mode A/C   Set Rate 17 BPM   Vt Set 420 mL   PEEP/CPAP 5 cmH20   Peak Flow 50 L/min   Peak End Inspiratory Pressure 15 cmH20   I-Trigger Type  V-TRIG   Trigger Sensitivity Flow/I-Trigger 3 L/min   Patient Ventilator Parameters   Resp Rate Total 17 br/min   Peak Airway Pressure 18 cmH2O   Mean Airway Pressure 8 cmH20   Plateau Pressure 0 cmH20   Exhaled Vt 439 mL   Total Ve 7.54 mL   I:E Ratio Measured 1:2.80   Auto PEEP 0 cmH20   Conventional Ventilator Alarms   Alarms On Y   Resp Rate High Alarm 50 br/min   Press High Alarm 60 cmH2O   Apnea Rate 30   Apnea Volume (mL) 0 mL   Apnea Oxygen Concentration  100   Apnea Flow Rate  (L/min) 60   T Apnea 20 sec(s)   Ready to Wean/Extubation Screen   FIO2<=50 (chart decimal) 0.4   MV<16L (chart vol.) 7.54   PEEP <=8 (chart #) 5   Ready to Wean Parameters   F/VT Ratio<105 (RSBI) (!) 41   Respiratory Evaluation   $ Care Plan Tech Time 15 min

## 2022-06-19 NOTE — PLAN OF CARE
Plan of Care and Educatio reviewed with Patient, her sister, and brother at bedside. Verbalization of Understanding Expressed. Bed locked, in low position, call light and communication pad/pen within reach.   Problem: Adjustment to Illness COPD (Chronic Obstructive Pulmonary Disease)  Goal: Optimal Chronic Illness Coping  Outcome: Ongoing, Progressing     Problem: Functional Ability Impaired COPD (Chronic Obstructive Pulmonary Disease)  Goal: Optimal Level of Functional [place]  Outcome: Ongoing, Progressing     Problem: Infection COPD (Chronic Obstructive Pulmonary Disease)  Goal: Absence of Infection Signs and Symptoms  Outcome: Ongoing, Progressing     Problem: Oral Intake Inadequate COPD (Chronic Obstructive Pulmonary Disease)  Goal: Improved Nutrition Intake  Outcome: Ongoing, Progressing     Problem: Respiratory Compromise COPD (Chronic Obstructive Pulmonary Disease)  Goal: Effective Oxygenation and Ventilation  Outcome: Ongoing, Progressing     Problem: Adult Inpatient Plan of Care  Goal: Plan of Care Review  Outcome: Ongoing, Progressing  Goal: Patient-Specific Goal (Individualized)  Outcome: Ongoing, Progressing  Goal: Absence of Hospital-Acquired Illness or Injury  Outcome: Ongoing, Progressing  Goal: Optimal Comfort and Wellbeing  Outcome: Ongoing, Progressing  Goal: Readiness for Transition of Care  Outcome: Ongoing, Progressing     Problem: Infection  Goal: Absence of Infection Signs and Symptoms  Outcome: Ongoing, Progressing     Problem: Fluid Imbalance (Pneumonia)  Goal: Fluid Balance  Outcome: Ongoing, Progressing     Problem: Infection (Pneumonia)  Goal: Resolution of Infection Signs and Symptoms  Outcome: Ongoing, Progressing     Problem: Respiratory Compromise (Pneumonia)  Goal: Effective Oxygenation and Ventilation  Outcome: Ongoing, Progressing     Problem: Communication Impairment (Mechanical Ventilation, Invasive)  Goal: Effective Communication  Outcome: Ongoing, Progressing     Problem:  Device-Related Complication Risk (Mechanical Ventilation, Invasive)  Goal: Optimal Device Function  Outcome: Ongoing, Progressing     Problem: Inability to Wean (Mechanical Ventilation, Invasive)  Goal: Mechanical Ventilation Liberation  Outcome: Ongoing, Progressing     Problem: Nutrition Impairment (Mechanical Ventilation, Invasive)  Goal: Optimal Nutrition Delivery  Outcome: Ongoing, Progressing     Problem: Skin and Tissue Injury (Mechanical Ventilation, Invasive)  Goal: Absence of Device-Related Skin and Tissue Injury  Outcome: Ongoing, Progressing     Problem: Ventilator-Induced Lung Injury (Mechanical Ventilation, Invasive)  Goal: Absence of Ventilator-Induced Lung Injury  Outcome: Ongoing, Progressing     Problem: Communication Impairment (Artificial Airway)  Goal: Effective Communication  Outcome: Ongoing, Progressing     Problem: Device-Related Complication Risk (Artificial Airway)  Goal: Optimal Device Function  Outcome: Ongoing, Progressing     Problem: Skin and Tissue Injury (Artificial Airway)  Goal: Absence of Device-Related Skin or Tissue Injury  Outcome: Ongoing, Progressing     Problem: Noninvasive Ventilation Acute  Goal: Effective Unassisted Ventilation and Oxygenation  Outcome: Ongoing, Progressing     Problem: Skin Injury Risk Increased  Goal: Skin Health and Integrity  Outcome: Ongoing, Progressing

## 2022-06-19 NOTE — ASSESSMENT & PLAN NOTE
CT with concern for possible bowel obstruction with stool burden  Does report gas pain today  General surgery following, with further recommendations

## 2022-06-19 NOTE — PROGRESS NOTES
[facility] Medicine  Progress Note    Patient Name: Estelle Cast  MRN: [id number]  Patient Class: IP- Inpatient   Admission Date: 6/17/2022  Length of Stay: 2 days  Attending Physician: Reyna Parada MD  Primary Care Provider: Primary Doctor No        Subjective:     Principal Problem:Chronic respiratory failure        HPI:  56-year-old with past medical history significant for COPD, BMI 14.8, depression, generalized anxiety, DANIS, extensive smoking history presenting with worsening shortness breath.  At baseline she uses 3 L it was reported that her oxygen was in the low 80s.  Minimally productive cough.  Chronic diarrhea unchanged from baseline.  No fevers, chills, nausea, vomiting, sick contacts.  No chest pain.  Patient has history of chronic abdominal pain from bowel resection from severe sepsis episode in the past.  On presentation she had been complaining of some upper abdominal pain that was mild.  Family recently relocated from [place].    At time of my evaluation the patient had been intubated for worsening shortness of breath and acute respiratory decompensation with code.  Per ED physician verbal report, patient had gone into torsades and subsequently V-tach.  CPR, epinephrine, amiodarone, shock, and magnesium were given.  Patient responded and is now intubated.  Approximate time of code was less than 10 minutes.  She is currently on Levophed drip.  She is alert and able to give the thumbs-up sign.  My history is limited to report from emergency department.    ED lab significant for WBC 18, Na 134, CO2 48, creatinine 0.3, BNP 60.  Troponins negative.  Lactic acid negative.  Procalcitonin negative.    Chest x-ray significant for tiny interstitial opacity and right upper lobe and right lower lung base.  Lungs are hyperexpanded consistent with emphysema.    In the ED she received a dose of the azithromycin, ceftriaxone, and later meropenem after she was intubated.  She is  also received a dose of Solu-Medrol and DuoNeb treatments.        Overview/Hospital Course:  Assumed care of this patient on 6/18/22.  Patient recently relocated to [place] to live with sister from [place].  Known history of COPD, chronic respiratory failure on 3 L home oxygen, continued tobacco use.  States in December needed emergent surgery, was taken to [facility], had abdominal resection, since then she is followed by surgery and Gastroenterology, chronic abdominal pain, intermittent worsening, loose stools and as per report seems she was placed on b.i.d. Questran.  Initially presented to the ED due to worsening abdominal pain.  Subsequently with worsening respiratory status, cough productive of greenish phlegm, cardiopulmonary arrest (reported torsades with VT after azithromycin and EKG with QT prolongation), status post ROSC, intubated and transferred to the ICU.  Chest x-ray with concern for right lung pneumonia, likely aspiration.  CT abdomen with concern for large stool burden large bowel and concern for partial colonic obstruction.  Patient is severely malnourished.  On 06/18 remains orally intubated but awake, alert, communicating via writing, copious amounts of phlegm production, denies any abdominal pain, states she is not passing flatus, no bowel movements today.  On 06/19, EKG with continued QTC prolongation, echo now with EF of 25% with grade 1 diastolic dysfunction, started on metoprolol, cardiology following.  Plan is attempted extubation to BiPAP.  PICC line when able to start TPN.      Interval History:  Patient reports some abdominal pain, no bowel movement or no flatus, asking for gas medication.  Continues to produce respiratory secretions.  Denies any chest pain.  Afebrile with T-max 98.7°, orally intubated on ventilator FiO2 40, on fentanyl but is alert, communicating with Tums up and writing.  Labs with WBC 12, hemoglobin 9.2, BUN/creatinine 20/0.3.  ABG this morning  7.39/72/80/20.  EKG sinus rhythm with PVCs with QT prolongation.  Echo with EF of 25% with grade 1 diastolic dysfunction.  Respiratory sample no organisms.  Documented urine output 810 CC.  Discussed with patient.  Discussed with ICU RN.      Review of Systems   Constitutional:  Negative for fever.   Respiratory:  Positive for cough.    Cardiovascular:  Negative for chest pain.   Gastrointestinal:  Positive for abdominal pain and constipation.   Psychiatric/Behavioral:  Negative for confusion.    Objective:     Vital Signs (Most Recent):  Temp: 96.9 °F (36.1 °C) (06/19/22 0001)  Pulse: 85 (06/19/22 0717)  Resp: 18 (06/19/22 0717)  BP: (!) 142/75 (06/19/22 0631)  SpO2: 95 % (06/19/22 0717)   Vital Signs (24h Range):  Temp:  [96.9 °F (36.1 °C)-98.7 °F (37.1 °C)] 96.9 °F (36.1 °C)  Pulse:  [] 85  Resp:  [15-62] 18  SpO2:  [86 %-100 %] 95 %  BP: (114-188)/(56-84) 142/75  Arterial Line BP: ()/(51-68) 132/56     Weight: 47.6 kg (104 lb 15 oz)  Body mass index is 18.01 kg/m².    Intake/Output Summary (Last 24 hours) at 6/19/2022 0910  Last data filed at 6/19/2022 0501  Gross per 24 hour   Intake 2431.23 ml   Output 810 ml   Net 1621.23 ml      Physical Exam  Vitals and nursing note reviewed.   Constitutional:       Comments: Critically and chronically ill-appearing   HENT:      Head: Normocephalic and atraumatic.      Mouth/Throat:      Comments: ETT in place  Cardiovascular:      Rate and Rhythm: Normal rate and regular rhythm.   Pulmonary:      Comments: Orally intubated on mechanical ventilation FiO2 of 40 with 5 of PEEP  Abdominal:      Comments: Mildly distended but soft, hypoactive bowel sounds right abdomen, nontender   Genitourinary:     Comments: Wong catheter in place with yellow urine draining  Musculoskeletal:      Cervical back: Neck supple.      Comments: Evidence of severe muscle wasting, cachexia with low BMI   Skin:     Comments: Bruising upper extremities, healed lesions lower extremity.   Right radial arterial line with some oozing noted   Neurological:      Mental Status: She is alert and oriented to person, place, and time.      Comments: On fentanyl but alert, oriented, communicating via written       Significant Labs: Blood Culture:   Recent Labs   Lab 06/17/22  1307 06/17/22  1320   LABBLOO No Growth to date  No Growth to date No Growth to date  No Growth to date     BMP:   Recent Labs   Lab 06/19/22  0514   *   *   K 4.0   CL 87*   CO2 39*   BUN 20   CREATININE 0.3*   CALCIUM 8.2*   MG 1.8     CBC:   Recent Labs   Lab 06/17/22  1307 06/18/22  0304 06/18/22  0411 06/19/22  0510 06/19/22  0514   WBC 17.99* 22.69*  --   --  12.12   HGB 11.0* 9.5*  --   --  9.2*   HCT 37.2 30.8* 32* 31* 29.4*    216  --   --  190     CMP:   Recent Labs   Lab 06/17/22  1307 06/18/22  0304 06/19/22  0514   * 132* 132*   K 4.3 3.7 4.0   CL 80* 85* 87*   CO2 48* 40* 39*   GLU 82 101 115*   BUN 10 15 20   CREATININE 0.3* 0.5 0.3*   CALCIUM 8.5* 7.8* 8.2*   PROT 6.9  --  5.5*   ALBUMIN 3.6  --  2.6*   BILITOT 1.0  --  0.6   ALKPHOS 52*  --  48*   AST 15  --  27   ALT 10  --  25   ANIONGAP 6* 7* 6*   EGFRNONAA >60.0 >60.0 >60.0     Cardiac Markers:   Recent Labs   Lab 06/17/22  1307   BNP 60     Coagulation:   Recent Labs   Lab 06/19/22  0514   INR 1.0     Lactic Acid:   Recent Labs   Lab 06/17/22  1307   LACTATE 0.8     Magnesium:   Recent Labs   Lab 06/17/22  1307 06/18/22  0304 06/19/22  0514   MG 1.6 2.2 1.8     POCT Glucose: No results for input(s): POCTGLUCOSE in the last 48 hours.  Troponin:   Recent Labs   Lab 06/17/22  1307   TROPONINI <0.030     TSH: No results for input(s): TSH in the last 4320 hours.  Urine Culture: No results for input(s): LABURIN in the last 48 hours.  Urine Studies: No results for input(s): COLORU, APPEARANCEUA, PHUR, SPECGRAV, PROTEINUA, GLUCUA, KETONESU, BILIRUBINUA, OCCULTUA, NITRITE, UROBILINOGEN, LEUKOCYTESUR, RBCUA, WBCUA, BACTERIA, SQUAMEPITHEL,  HYALINECASTS in the last 48 hours.    Invalid input(s): TRI    Significant Imaging: I have reviewed and interpreted all pertinent imaging results/findings within the past 24 hours.      Assessment/Plan:      * Acute on chronic respiratory failure requiring mechanical ventilation  Intubated in the ED.  On 06/19 plan is for extubation to BiPAP  P.r.n. ABG and chest x-ray  Pulmonary following    VT (ventricular tachycardia)  QTC prolongation with torsades and ventricular tachycardia.  No further episodes.  Started on metoprolol.  Continuous cardiac monitoring      VT/torsades cardiac arrest with ROSC  Patient had ROSC, see QT and cardiomyopathy        Abdominal pain  Acute on chronic  CT is concerning for bowel obstruction with large stool burden, patient is reporting gas pain        Possible bowel obstruction   CT with concern for possible bowel obstruction with stool burden  Does report gas pain today  General surgery following, with further recommendations      QT prolongation  With torsades and VT in the ED  Avoid multiple QTC prolonging medications, checking EKG daily  Keep potassium greater than 4, magnesium greater than 2      Cardiomyopathy  Echo this admission with EF of 25% with grade 1 diastolic dysfunction  Denies any previous known history of heart disease  On 06/18 started on metoprolol, adjust goal-directed therapy as able  Will need left heart catheterization/further evaluation as per Cardiology  Monitor for signs of volume overload  Appreciate cardiology input      Hyponatremia  Optimizing nutrition as able and following      Anemia  Chronic, no evidence of active bleeding, monitoring      COPD (chronic obstructive pulmonary disease)  Treating for COPD exacerbation with right lung pneumonia  Continue scheduled DuoNebs, continue IV steroids and wean as able        Severe malnutrition  Chronic issue.  There was concern for bowel obstruction and surgery is following.  Oral/enteral nutrition once  able  In the interim continue Clinimix, consult for PICC line, once in place start TPN    BALDEMAR (generalized anxiety disorder)  Continue p.r.n. benzodiazepine and monitoring      Smoker  Nicotine patch in place.  Continue to reinforce smoking cessation    Right lung pneumonia, likely aspiration  Producing greenish phlegm.  Respiratory sample with no pathogens but radiological evidence of right lung pneumonia and suspect aspiration  Continue meropenem, adjust as needed      Obstructive sleep apnea syndrome  Has had multiple admissions for hypercapnic respiratory failure as per report  Plan is to extubate to BiPAP today, ABGs to be followed closely      BMI less than 19,adult  See malnutrition      VTE Risk Mitigation (From admission, onward)         Ordered     enoxaparin injection 40 mg  Daily         06/17/22 2317     Place sequential compression device  Until discontinued         06/17/22 2317     IP VTE HIGH RISK PATIENT  Once         06/17/22 2317     Reason for no Mechanical VTE Prophylaxis  Once        Question:  Reasons:  Answer:  Physician Provided (leave comment)    06/17/22 2317                Discharge Planning   LIZANDRO:      Code Status: Full Code   Is the patient medically ready for discharge?:     Reason for patient still in hospital (select all that apply): Patient unstable                     Reyna Parada MD  Department of Hospital Medicine   On license of [facility]

## 2022-06-19 NOTE — ASSESSMENT & PLAN NOTE
Echo this admission with EF of 25% with grade 1 diastolic dysfunction  Denies any previous known history of heart disease  On 06/18 started on metoprolol, adjust goal-directed therapy as able  Will need left heart catheterization/further evaluation as per Cardiology  Monitor for signs of volume overload  Appreciate cardiology input

## 2022-06-19 NOTE — ASSESSMENT & PLAN NOTE
Intubated in the ED.  On 06/19 plan is for extubation to BiPAP  P.r.n. ABG and chest x-ray  Pulmonary following

## 2022-06-19 NOTE — PROGRESS NOTES
[facility]  Department of Cardiology  Progress Note      PATIENT NAME: Estelle Cast    MRN: [id number]  TODAY'S DATE: 06/19/2022  ADMIT DATE: 6/17/2022                          CONSULT REQUESTED BY: Reyna Parada MD    SUBJECTIVE     PRINCIPAL PROBLEM: Chronic respiratory failure    6/19/22:  Patient seen resting in bed on CPAP/BiPAP.  Her sister is at bedside and is currently going through stacks of records.  According to the patient she has not had a cardiac angiogram in the past but has had a workup.  Currently she is in sinus rhythm with occasional PVCs which have improved.    REASON FOR CONSULT:    From H&P: 56-year-old with past medical history significant for COPD, BMI 14.8, depression, generalized anxiety, DANIS, extensive smoking history presenting with worsening shortness breath.  At baseline she uses 3 L it was reported that her oxygen was in the low 80s.  Minimally productive cough.  Chronic diarrhea unchanged from baseline.  No fevers, chills, nausea, vomiting, sick contacts.  No chest pain.  Patient has history of chronic abdominal pain from bowel resection from severe sepsis episode in the past.  On presentation she had been complaining of some upper abdominal pain that was mild.  Family recently relocated from [place].     At time of my evaluation the patient had been intubated for worsening shortness of breath and acute respiratory decompensation with code.  Per ED physician verbal report, patient had gone into torsades and subsequently V-tach.  CPR, epinephrine, amiodarone, shock, and magnesium were given.  Patient responded and is now intubated.  Approximate time of code was less than 10 minutes.  She is currently on Levophed drip.  She is alert and able to give the thumbs-up sign.  My history is limited to report from emergency department.     ED lab significant for WBC 18, Na 134, CO2 48, creatinine 0.3, BNP 60.  Troponins negative.  Lactic acid negative.  Procalcitonin  negative.     Chest x-ray significant for tiny interstitial opacity and right upper lobe and right lower lung base.  Lungs are hyperexpanded consistent with emphysema.     In the ED she received a dose of the azithromycin, ceftriaxone, and later meropenem after she was intubated.  She is also received a dose of Solu-Medrol and DuoNeb treatments.           No new subjective & objective note has been filed under this hospital service since the last note was generated.      HPI:    Patient is a 56-year-old female who presented to the emergency room with worsening shortness of breath.  Patient was noted to be hypoxic with O2 sat in the 80s.  In the emergency room the patient went into torsades and subsequently ventricular tachycardia according to H&P.  Patient was coded with ACLS protocol and achieved Rosc.  She was intubated and put in the ICU at which time she was placed on a Levophed drip.    Patient's niece was present at bedside this morning, but she was not aware of the patient's medical history other than that she had been having some abdominal pain at her home in [place] and decided to come over and stay with her sister.      Review of patient's allergies indicates:   Allergen Reactions    Celexa [citalopram]     Breztri aerosphere [budesonide-glycopyr-formoterol]     Pcn [penicillins]     Shellfish containing products     Advair diskus [fluticasone propion-salmeterol] Rash    Buspirone (bulk) Rash    Chantix [varenicline] Nausea And Vomiting       Past Medical History:   Diagnosis Date    Anemia, unspecified     Asthma     Chronic respiratory failure with hypoxia     COPD (chronic obstructive pulmonary disease)     Dysuria     Hypokalemia     Hypomagnesemia     Hyponatremia     Malnutrition     Sepsis, unspecified organism      Past Surgical History:   Procedure Laterality Date    ABDOMINAL SURGERY      CHOLECYSTECTOMY      COLECTOMY      12/2021    HYSTERECTOMY       Social History     Tobacco Use    Smoking  status: Current Every Day Smoker     Packs/day: 0.50    Smokeless tobacco: Never Used   Substance Use Topics    Alcohol use: Not Currently    Drug use: Yes     Types: Marijuana     Comment: x 5 days ago        REVIEW OF SYSTEMS      OBJECTIVE     VITAL SIGNS (Most Recent)  Temp: 96.9 °F (36.1 °C) (06/19/22 0001)  Pulse: 73 (06/19/22 1122)  Resp: 16 (06/19/22 1122)  BP: 124/78 (06/19/22 0924)  SpO2: 96 % (06/19/22 1122)    VENTILATION STATUS  Resp: 16 (06/19/22 1122)  SpO2: 96 % (06/19/22 1122)  Vent Mode: Spont  Oxygen Concentration (%):  [35-40] 40  Resp Rate Total:  [15 br/min-23 br/min] 15 br/min  Vt Set:  [400 mL-420 mL] 420 mL  PEEP/CPAP:  [5 cmH20] 5 cmH20  Pressure Support:  [10 cmH20] 10 cmH20  Mean Airway Pressure:  [7.8 byH37-69 cmH20] 7.8 cmH20    I & O (Last 24H):    Intake/Output Summary (Last 24 hours) at 6/19/2022 1153  Last data filed at 6/19/2022 0900  Gross per 24 hour   Intake 1981.33 ml   Output 935 ml   Net 1046.33 ml       WEIGHTS  Wt Readings from Last 1 Encounters:   06/17/22 2245 47.6 kg (104 lb 15 oz)   06/17/22 1100 39 kg (86 lb)       PHYSICAL EXAM  CONSTITUTIONAL: No fever, no chills; acutely ill female seen resting in bed. No distress noted.  HEENT: Normocephalic, atraumatic,pupils reactive to light                 NECK:  No JVD no carotid bruit  CVS: S1S2+, RRR  LUNGS: Clear  intubated  ABDOMEN: Soft, NT, BS+  EXTREMITIES: No cyanosis, edema  : + eugene catheter  NEURO: sedated      HOME MEDICATIONS:  No current facility-administered medications on file prior to encounter.     Current Outpatient Medications on File Prior to Encounter   Medication Sig Dispense Refill    acetaminophen (TYLENOL) 500 MG tablet Take 500 mg by mouth every 6 (six) hours as needed for Pain.      albuterol (ACCUNEB) 0.63 mg/3 mL Nebu Take 0.63 mg by nebulization every 6 (six) hours as needed. Rescue      ALPRAZolam (XANAX) 0.25 MG tablet Take by mouth 3 (three) times daily.      azelastine (ASTELIN) 137 mcg  (0.1 %) nasal spray 1 spray by Nasal route 2 (two) times daily.      azithromycin (Z-CAMI) 250 MG tablet Take 500 mg by mouth once daily.      fluticasone furoate (ARNUITY ELLIPTA) 200 mcg/actuation inhaler Inhale 200 mcg into the lungs. Controller      guaiFENesin (MUCINEX) 600 mg 12 hr tablet Take 1,200 mg by mouth once daily.      ipratropium (ATROVENT HFA) 17 mcg/actuation inhaler Inhale 2 puffs into the lungs every 6 (six) hours. Rescue      ipratropium/albuterol sulfate (DUONEB INHL) Inhale into the lungs.      latanoprost, PF, 0.005 % Drop Apply to eye.      neomycin-polymyxin-hydrocortisone (CORTISPORIN) 3.5-10,000-10 mg-unit-mg/mL ophthalmic suspension 1 drop every 4 (four) hours.      nicotine (NICODERM CQ) 14 mg/24 hr Place 1 patch onto the skin every 24 hours.      pantoprazole (PROTONIX) 40 MG tablet Take 40 mg by mouth once daily.         SCHEDULED MEDS:   albuterol-ipratropium  3 mL Nebulization Q4H    enoxaparin  40 mg Subcutaneous Daily    famotidine (PF)  20 mg Intravenous BID    guaiFENesin  1,200 mg Oral Daily    latanoprost  1 drop Both Eyes QHS    meropenem (MERREM) IVPB  1 g Intravenous Q8H    methylPREDNISolone sodium succinate injection  60 mg Intravenous Q8H    metoprolol tartrate  25 mg Oral BID    nicotine  1 patch Transdermal Daily    sodium phosphates  1 enema Rectal Once       CONTINUOUS INFUSIONS:   amino acids 4.25%-ilvdq-Dl-P9A 75 mL/hr at 06/19/22 0501    fentanyl 92.5 mcg/hr (06/19/22 0637)       PRN MEDS:acetaminophen, albuterol-ipratropium, ALPRAZolam, calcium gluconate IVPB, calcium gluconate IVPB, calcium gluconate IVPB, dextrose 50%, dextrose 50%, glucagon (human recombinant), glucose, glucose, magnesium oxide, magnesium oxide, magnesium sulfate IVPB, magnesium sulfate IVPB, morphine, naloxone, polyethylene glycol, potassium bicarbonate, potassium bicarbonate, potassium bicarbonate, potassium chloride in water **AND** potassium chloride in water **AND** potassium chloride in  water, prochlorperazine, sodium chloride 0.9%, sodium chloride 0.9%, sodium phosphate IVPB, sodium phosphate IVPB, sodium phosphate IVPB    LABS AND DIAGNOSTICS     CBC LAST 3 DAYS  Recent Labs   Lab 06/17/22  1307 06/18/22  0304 06/18/22  0411 06/19/22  0510 06/19/22  0514   WBC 17.99* 22.69*  --   --  12.12   RBC 3.88* 3.38*  --   --  3.28*   HGB 11.0* 9.5*  --   --  9.2*   HCT 37.2 30.8* 32* 31* 29.4*   MCV 96 91  --   --  90   MCH 28.4 28.1  --   --  28.0   MCHC 29.6* 30.8*  --   --  31.3*   RDW 15.4* 15.5*  --   --  15.9*    216  --   --  190   MPV 8.4* 8.6*  --   --  8.5*   GRAN 91.0*  16.4* 90.6*  20.5*  --   --  93.1*  11.3*   LYMPH 4.4*  0.8* 5.6*  1.3  --   --  4.3*  0.5*   MONO 3.6*  0.6 3.1*  0.7  --   --  2.0*  0.2*   BASO 0.03 0.01  --   --  0.00   NRBC 0 0  --   --  0       COAGULATION LAST 3 DAYS  Recent Labs   Lab 06/19/22  0514   LABPT 12.7   INR 1.0       CHEMISTRY LAST 3 DAYS  Recent Labs   Lab 06/17/22  1307 06/17/22  1507 06/18/22  0304 06/18/22  0411 06/18/22  0524 06/19/22  0510 06/19/22  0514   *  --  132*  --   --   --  132*   K 4.3  --  3.7  --   --   --  4.0   CL 80*  --  85*  --   --   --  87*   CO2 48*  --  40*  --   --   --  39*   ANIONGAP 6*  --  7*  --   --   --  6*   BUN 10  --  15  --   --   --  20   CREATININE 0.3*  --  0.5  --   --   --  0.3*   GLU 82  --  101  --   --   --  115*   CALCIUM 8.5*  --  7.8*  --   --   --  8.2*   PH  --    < >  --  7.510* 7.530* 7.398  --    MG 1.6  --  2.2  --   --   --  1.8   ALBUMIN 3.6  --   --   --   --   --  2.6*   PROT 6.9  --   --   --   --   --  5.5*   ALKPHOS 52*  --   --   --   --   --  48*   ALT 10  --   --   --   --   --  25   AST 15  --   --   --   --   --  27   BILITOT 1.0  --   --   --   --   --  0.6    < > = values in this interval not displayed.       CARDIAC PROFILE LAST 3 DAYS  Recent Labs   Lab 06/17/22  1307   BNP 60   TROPONINI <0.030       ENDOCRINE LAST 3 DAYS  Recent Labs   Lab 06/17/22  1307   PROCAL  0.05       LAST ARTERIAL BLOOD GAS  ABG  Recent Labs   Lab 06/19/22  0510   PH 7.398   PO2 80   PCO2 72.1*   HCO3 44.5*   BE 20       LAST 7 DAYS MICROBIOLOGY   Microbiology Results (last 7 days)       Procedure Component Value Units Date/Time    Culture, Respiratory with Gram Stain [[id number]] Collected: 06/18/22 1535    Order Status: Completed Specimen: Respiratory from Tracheal Aspirate Updated: 06/19/22 0849     Respiratory Culture No Growth     Gram Stain (Respiratory) Many WBC's     Gram Stain (Respiratory) <10 epithelial cells per low power field.     Gram Stain (Respiratory) No organisms seen    Blood culture #1 **CANNOT BE ORDERED STAT** [[id number]] Collected: 06/17/22 1307    Order Status: Completed Specimen: Blood from Peripheral, Antecubital, Left Updated: 06/18/22 1432     Blood Culture, Routine No Growth to date      No Growth to date    Blood culture #2 **CANNOT BE ORDERED STAT** [[id number]] Collected: 06/17/22 1320    Order Status: Completed Specimen: Blood from Peripheral, Antecubital, Right Updated: 06/18/22 1432     Blood Culture, Routine No Growth to date      No Growth to date    MRSA Screen by PCR [[id number]] Collected: 06/18/22 0121    Order Status: Completed Specimen: Nasopharyngeal Swab from Nasal Updated: 06/18/22 0358     MRSA SCREEN BY PCR Negative            MOST RECENT IMAGING  X-Ray Chest AP Portable  EXAM:  XR Chest, 1 View    CLINICAL HISTORY:  intubated    TECHNIQUE:  Frontal view of the chest.    COMPARISON:  Radiography and CT June 17, 2022    FINDINGS:  Lungs: Pulmonary hyperinflation. Slight increased attenuation projecting over the periphery of the right upper lung, decreased compared with the prior radiograph performed 2 days ago, and corresponding with infiltrate on the CT performed 2 days ago. Other infiltrates seen on the CT are underestimated by this and the prior radiograph.  Pleural space:  No pleural effusion or pneumothorax.  Heart:  No cardiomegaly.  Mediastinum:   Normal contours.  Bones/joints: Unremarkable.  Tubes, lines and devices:  The endotracheal tube (ETT) is in satisfactory position with tip 6 cm above the shauna.  Right IJ central vascular catheter within the SVC.  NG tube tip at the GE junction and unchanged compared with the prior study.    IMPRESSION:  1.  Stable lines and tubes. NG tube tip at the GE junction. Consider advancing.  2.  Improving infiltrate in the right upper lobe, based on decreased attenuation of the opacification in the periphery of the right upper lung corresponding with findings on CT and radiography performed 2 days ago. Other opacities seen in detail on the CT are underestimated on the prior radiograph and this exam.    Electronically signed by:  Nehal Mabry MD  6/19/2022 8:33 AM CDT Workstation: [id number]      ECHOCARDIOGRAM RESULTS (last 5)  No results found for this or any previous visit.    Summary    The left ventricle is normal in size with moderate concentric hypertrophy and severely decreased systolic function.  Grade I left ventricular diastolic dysfunction.  Moderate right ventricular enlargement with mildly reduced right ventricular systolic function.  The estimated ejection fraction is 25%.  Mechanically ventilated; cannot use inferior caval vein diameter to estimate central venous pressure.  Mild tricuspid regurgitation.  Trivial pericardial effusion.          CURRENT/PREVIOUS VISIT EKG  Results for orders placed or performed during the hospital encounter of 06/17/22   EKG 12-lead    Collection Time: 06/19/22  7:01 AM    Narrative    Test Reason : I47.2,    Vent. Rate : 077 BPM     Atrial Rate : 077 BPM     P-R Int : 150 ms          QRS Dur : 078 ms      QT Int : 448 ms       P-R-T Axes : 078 005 241 degrees     QTc Int : 506 ms    Sinus rhythm with occasional Premature ventricular complexes  Low voltage QRS  ST and Marked T-wave abnormality, consider inferolateral ischemia  Prolonged QT  Abnormal ECG  When compared with ECG  of 18-JUN-2022 14:58,  Premature ventricular complexes are now Present  Criteria for Septal infarct are no longer Present  T wave inversion now evident in Inferior leads  Inverted T waves have replaced nonspecific T wave abnormality in Lateral  leads    Referred By: [email or web address]   SELF           Confirmed By:            ASSESSMENT/PLAN:     Active Hospital Problems    Diagnosis    *Acute on chronic respiratory failure requiring mechanical ventilation    Cardiomyopathy    QT prolongation    Possible bowel obstruction     VT (ventricular tachycardia)    BALDEMAR (generalized anxiety disorder)    Severe malnutrition    COPD (chronic obstructive pulmonary disease)    Anemia    Hyponatremia    VT/torsades cardiac arrest with ROSC     Patient coded in emergency department  Intubated  Admit to ICU  Consult to Critical Care      Right lung pneumonia, likely aspiration    Smoker    Abdominal pain    BMI less than 19,adult    Obstructive sleep apnea syndrome       ASSESSMENT & PLAN:     1. Cardiac arrest  2. Torsades/VT  3. Acute hypoxic respiratory failure  4. Malnutrition  5. Right lung pneumonia  6. Smoker  7. COPD  8. DANIS  9. Hypomagnesemia on arrival      RECOMMENDATIONS:    1. Echocardiogram shows severely decreased ejection fraction about 25%.  Discussed this with the patient and with her sister.  Possibility that she has had an MI in the past.  She will need an angiogram, but we will defer until she is improved otherwise.  2. Continue to check and replace potassium and magnesium. Goal for potassium is 4.0, and goal for magnesium is 2.0.   3. Respiratory management per pulmonary.   4. Patient reports having a rash due to azithromycin in the past.  Azithromycin allergy is noted on previous medical records from [place].  5. Further recommendations based on hospital course. Thank you for the consultation.         Livia Taylor NP  [facility]  Department of Cardiology  Date of Service: 06/19/2022  2:44 PM    .   PATIENT IS ADMITTED WHILE VISITING FROM [place] WITH RIGHT LOWER LOBE PNEUMONIA AND HYPOXEMIA SHORTNESS OF BREATH.  SHE WAS INTUBATED..  CURRENTLY ON CPAP.      ECHOCARDIOGRAM REVEALS APICAL AKINESIA CONSISTENT WITH TAKO NELLY SYNDROME OR ANTERIOR MI.EF   SHE HAS NO KNOWN CARDIAC CONDITION IN THE PAST.  SHE HAS PROLONGED QTC INTERVAL.  SHE HAS LOW MAGNESIUM  SHE RECEIVED ERYTHROMYCIN PRIOR TO THE EPISODE OF TORSADE [place] WHICH PROBABLY CAUSED HER A ARRHYTHMIAS.    SHE DOES HAVE A HISTORY OF EXTENSIVE GI ILLNESS AND DIARRHEA WITH PROFOUND WEIGHT LOSS AFTER BOWEL SURGERY IN [place]..  SHE HAS GAINED 2 LB SINCE February.  SHE IS BROUGHT HERE FROM [place] BY HER SISTER WHO IS CONCERNED ABOUT HER HEALTH AND MEDICAL CARE SHE HAS A FAMILY HISTORY OF HEART DISEASE.  THE PATIENT IS MEDICAL RECORDS FROM THE SISTER WERE REVIEWED BUT DID NOT REAL REVEAL ANY ETIOLOGY OF HER GI ISSUES.    DISCUSSED PROBABLE HEART CATHETERIZATION AT LATER DATE BEFORE DISCHARGE.    I have seen the patient, reviewed the Nurse Practitioner's history and physical, assessment, plan, progress note and consultation note. I have personally interviewed and examined the patient at bedside and agree with the findings.       Albino Morales MD  Department of Cardiology  [facility]

## 2022-06-19 NOTE — ASSESSMENT & PLAN NOTE
With torsades and VT in the ED  Avoid multiple QTC prolonging medications, checking EKG daily  Keep potassium greater than 4, magnesium greater than 2

## 2022-06-19 NOTE — PROGRESS NOTES
Progress Note  PULMONARY    Admit Date: 6/17/2022 06/19/2022  History of Present Illness:  Pt is a 55 yo female with COPD, malnutrition who presented to ED with resp distress and experienced code blue after arrival requiring 10min CPR, mag for torsades then had ROSC, intubated and was waking up and responsive. Pt admitted to ICU and pulmonary is consulted for further management/recommendations.  Further history is obtained over the phone with pt's sister and RAMIREZ Nichelle Tamialesia. She states pt was living in [place] where she had a pulmonologist caring for her, had very poor health with frequent admissions lately for COPD with CO2 retention (10 times so far this year) and moved to [place] to stay w/ sister 4 days ago. She developed acute on chronic abdominal pain and distention which made it difficult for her to breathe then had difficulty keeping sats up with home O2- uses 3L continuously. She was supposed to have NIV for home but had difficulty tolerating it and then difficulty getting it in [place]. Pt has had SBO requiring bowel resection 12/2021 and has had complications with abd pain, trouble eating off and on since then. She is noted to have SBO on this admission and surgery is consulted for further recommendations. Pt is a current smoker and wants to quit.       SUBJECTIVE:     6/19- pt waking up and writing notes to staff, continues on vent. cpap trial passed      OBJECTIVE:     Vitals (Most recent):  Vitals:    06/19/22 0717   BP:    Pulse: 85   Resp: 18   Temp:        Vitals (24 hour range):  Temp:  [96.9 °F (36.1 °C)-98.7 °F (37.1 °C)]   Pulse:  [71-91]   Resp:  [15-62]   BP: (101-188)/(56-84)   SpO2:  [87 %-100 %]   Arterial Line BP: ()/(51-68)       Intake/Output Summary (Last 24 hours) at 6/19/2022 0809  Last data filed at 6/19/2022 0101  Gross per 24 hour   Intake 2139.91 ml   Output 810 ml   Net 1329.91 ml          Physical Exam:  The patient's neuro status (alertness,orientation,cognitive  function,motor skills,), pharyngeal exam (oral lesions, hygiene, abn dentition,), Neck (jvd,mass,thyroid,nodes in neck and above/below clavicle),RESPIRATORY(symmetry,effort,fremitus,percussion,auscultation),  Cor(rhythm,heart tones including gallops,perfusion,edema)ABD(distention,hepatic&splenomegaly,tenderness,masses), Skin(rash,cyanosis),Psyc(affect,judgement,).  Exam negative except for these pertinent findings:    Awake, alert  Writing to communicate  Nods/shakes head  Follows commands with all extremities  Clear but diminished breath sounds   Abdomen is soft, nontender, BS+  No edema  Thin, cachectic   Bruising bilat arms    Radiographs reviewed: view by direct vision   CXR 6/19- hyperinflated, no significant change    Labs     Recent Labs   Lab 06/19/22  0514   WBC 12.12   HGB 9.2*   HCT 29.4*        Recent Labs   Lab 06/19/22  0514   *   K 4.0   CL 87*   CO2 39*   BUN 20   CREATININE 0.3*   *   CALCIUM 8.2*   MG 1.8   PHOS 3.4   AST 27   ALT 25   ALKPHOS 48*   BILITOT 0.6   PROT 5.5*   ALBUMIN 2.6*   INR 1.0     Recent Labs   Lab 06/19/22  0510   PH 7.398   PCO2 72.1*   PO2 80   HCO3 44.5*     Microbiology Results (last 7 days)     Procedure Component Value Units Date/Time    Culture, Respiratory with Gram Stain [[id number]] Collected: 06/18/22 1535    Order Status: Completed Specimen: Respiratory from Tracheal Aspirate Updated: 06/18/22 1634     Gram Stain (Respiratory) Many WBC's     Gram Stain (Respiratory) <10 epithelial cells per low power field.     Gram Stain (Respiratory) No organisms seen    Blood culture #1 **CANNOT BE ORDERED STAT** [[id number]] Collected: 06/17/22 1307    Order Status: Completed Specimen: Blood from Peripheral, Antecubital, Left Updated: 06/18/22 1432     Blood Culture, Routine No Growth to date      No Growth to date    Blood culture #2 **CANNOT BE ORDERED STAT** [[id number]] Collected: 06/17/22 1320    Order Status: Completed Specimen: Blood from Peripheral,  Antecubital, Right Updated: 06/18/22 1432     Blood Culture, Routine No Growth to date      No Growth to date    MRSA Screen by PCR [[id number]] Collected: 06/18/22 0121    Order Status: Completed Specimen: Nasopharyngeal Swab from Nasal Updated: 06/18/22 0358     MRSA SCREEN BY PCR Negative          Impression:  Active Hospital Problems    Diagnosis  POA    *Acute on chronic heart failure requiring mechanical ventilation [J96.10]  Yes    VT (ventricular tachycardia) [I47.2]  Clinically Undetermined    BALDEMAR (generalized anxiety disorder) [F41.1]  Yes     Chronic    Severe malnutrition [E43]  Yes     Chronic    COPD (chronic obstructive pulmonary disease) [J44.9]  Yes     Chronic    Leucocytosis [D72.829]  Yes    Anemia [D64.9]  Yes     Chronic    Hyponatremia [E87.1]  No    Acute hypercapnic respiratory failure [J96.02]  Yes    Centriacinar emphysema [J43.2]  Yes    VT/torsades cardiac arrest with ROSC [I46.9]  Yes     Patient coded in emergency department  Intubated  Admit to ICU  Consult to Critical Care      Right lung pneumonia, likely aspiration [J18.9]  Clinically Undetermined    Smoker [F17.200]  Yes    Abdominal pain [R10.9]  Yes    BMI less than 19,adult [Z68.1]  Not Applicable    Obstructive sleep apnea syndrome [G47.33]  Yes      Resolved Hospital Problems   No resolved problems to display.               Plan:     - extubate to BIPAP  - Dc fentanyl drip  - caution with sedating meds and opiates given severe respiratory failure  - continue inhaled bronchodilators  - decrease solumedrol to 40mg daily  - per surgery no intervention at this time for abdomen  - continue meropenem for pneumonia  - f/u sputum culture  - continue TPN- try to advance PO if resp status stable later today  - pepcid for GI prophylaxis  - lovenox for DVT prophylaxis      The following were evaluated and adjusted as needed: mechanical ventilator settings and weaning status, sedation and neurologic status, antibiotics and  acid base balance and oxygenation needs       Critical Care  - THE PATIENT HAS A HIGH PROBABILITY OF IMMINENT OR LIFE THREATENING DETERIORATION.  Over 50%time of encounter was in direct care at bedside.  Time was 30 to 74 minutes required for patient care.  Time needed for all of the above totaled 40 minutes.    Lizzie Negrete MD  Pulmonary & Critical Care Medicine                                .

## 2022-06-19 NOTE — CARE UPDATE
CPAP AB.39/72.1/80/44.5     22 0510   PRE-TX-O2   O2 Device (Oxygen Therapy) ventilator   Oxygen Concentration (%) 40   SpO2 97 %   Pulse 78   Resp 18   Vital Capacity   Vital Capacity (mL) 0   Vent Select   Conventional Vent Y   Charged w/in last 24h YES   Preset Conventional Ventilator Settings   Ventilation Type VC   Vent Mode Spont   Set Rate 20 BPM   Vt Set 400 mL   PEEP/CPAP 5 cmH20   Pressure Support 10 cmH20   Peak Flow 50 L/min   Peak End Inspiratory Pressure 15 cmH20   Insp Rise Time  70 %   I-Trigger Type  V-TRIG   Trigger Sensitivity Flow/I-Trigger 3 L/min   Patient Ventilator Parameters   Resp Rate Total 16 br/min   Peak Airway Pressure 15 cmH2O   Mean Airway Pressure 7.9 cmH20   Plateau Pressure 0 cmH20   Exhaled Vt 395 mL   Total Ve 7.33 mL   Spont Ve 7.33 L   I:E Ratio Measured 1:2.20   Auto PEEP 0 cmH20   Inspired Tidal Volume (VTI) 0 mL   Conventional Ventilator Alarms   Resp Rate High Alarm 50 br/min   Press High Alarm 60 cmH2O   Apnea Rate 30   Apnea Volume (mL) 0 mL   Apnea Oxygen Concentration  100   Apnea Flow Rate (L/min) 60   T Apnea 20 sec(s)   Ready to Wean/Extubation Screen   FIO2<=50 (chart decimal) 0.4   MV<16L (chart vol.) 7.33   PEEP <=8 (chart #) 5   Ready to Wean Parameters   F/VT Ratio<105 (RSBI) (!) 45.57   Labs   $ Was an ABG obtained? A Line;ISTAT - Blood gas;ISTAT - Calcium;ISTAT - Hematocrit;ISTAT - Potassium;ISTAT - Sodium   $ Labs Tech Time 15 min   Critical Value Communication   Date Result Received 22   Time Result Received 0515   Resulting Department of Critical Value resp   Who communicated critical value from resulting department? [facility]   Critical Test #1 co2   Critical Test #1 Result 72   Doctor not notified of critical value due to: known abnormal - expected abnormal

## 2022-06-19 NOTE — ASSESSMENT & PLAN NOTE
Chronic issue.  There was concern for bowel obstruction and surgery is following.  Oral/enteral nutrition once able  In the interim continue Clinimix, consult for PICC line, once in place start TPN

## 2022-06-19 NOTE — PLAN OF CARE
Critical access hospital  Initial Discharge Assessment       Primary Care Provider: Primary Doctor No    Admission Diagnosis: Acute hypercapnic respiratory failure [J96.02]    Admission Date: 6/17/2022  Expected Discharge Date:          Payor: [facility] MANAGED MCARE / Plan: [facility] DUAL COMPLETE HMO SNP / Product Type: Medicare Advantage /     Extended Emergency Contact Information  Primary Emergency Contact: Nam Cassidy  Mobile Phone: [phone]  Relation: Sister  Preferred language: English   needed? No    Discharge Plan A: Home with family  Discharge Plan B: Home with family    No Pharmacies Listed  Pt resides in [place]. Preferred pharmacy is in [place]. After discharge pt's plans are to go back home.   Initial Assessment (most recent)     Adult Discharge Assessment - 06/19/22 1050        Discharge Assessment    Assessment Type Discharge Planning Assessment     Confirmed/corrected address, phone number and insurance Yes     Confirmed Demographics Correct on Facesheet     Source of Information family;health record     Reason For Admission Acute on chronic respiratory failure requiring mechanical ventilation     Do you expect to return to your current living situation? Other (see comments)   pt resides in [place] , visiting family in [place]    Prior to hospitilization cognitive status: Alert/Oriented     Current cognitive status: Coma/Sedated/Intubated     Patient currently being followed by outpatient case management? Yes     If yes, name of outpatient case management following: insurance company assigned oupatient case management     Do you take prescription medications? Yes     Do you have prescription coverage? Yes     Coverage [employer] SNP     Who is going to help you get home at discharge? Nam Cassidy (Sister)   [phone] (Mobile)     Discharge Plan A Home with family     Discharge Plan B Home with family     DME Needed Upon Discharge  none

## 2022-06-19 NOTE — ASSESSMENT & PLAN NOTE
Has had multiple admissions for hypercapnic respiratory failure as per report  Plan is to extubate to BiPAP today, ABGs to be followed closely

## 2022-06-19 NOTE — SUBJECTIVE & OBJECTIVE
Interval History:  Patient reports some abdominal pain, no bowel movement or no flatus, asking for gas medication.  Continues to produce respiratory secretions.  Denies any chest pain.  Afebrile with T-max 98.7°, orally intubated on ventilator FiO2 40, on fentanyl but is alert, communicating with Tums up and writing.  Labs with WBC 12, hemoglobin 9.2, BUN/creatinine 20/0.3.  ABG this morning 7.39/72/80/20.  EKG sinus rhythm with PVCs with QT prolongation.  Echo with EF of 25% with grade 1 diastolic dysfunction.  Respiratory sample no organisms.  Documented urine output 810 CC.  Discussed with patient.  Discussed with ICU RN.      Review of Systems   Constitutional:  Negative for fever.   Respiratory:  Positive for cough.    Cardiovascular:  Negative for chest pain.   Gastrointestinal:  Positive for abdominal pain and constipation.   Psychiatric/Behavioral:  Negative for confusion.    Objective:     Vital Signs (Most Recent):  Temp: 96.9 °F (36.1 °C) (06/19/22 0001)  Pulse: 85 (06/19/22 0717)  Resp: 18 (06/19/22 0717)  BP: (!) 142/75 (06/19/22 0631)  SpO2: 95 % (06/19/22 0717)   Vital Signs (24h Range):  Temp:  [96.9 °F (36.1 °C)-98.7 °F (37.1 °C)] 96.9 °F (36.1 °C)  Pulse:  [] 85  Resp:  [15-62] 18  SpO2:  [86 %-100 %] 95 %  BP: (114-188)/(56-84) 142/75  Arterial Line BP: ()/(51-68) 132/56     Weight: 47.6 kg (104 lb 15 oz)  Body mass index is 18.01 kg/m².    Intake/Output Summary (Last 24 hours) at 6/19/2022 0910  Last data filed at 6/19/2022 0501  Gross per 24 hour   Intake 2431.23 ml   Output 810 ml   Net 1621.23 ml      Physical Exam  Vitals and nursing note reviewed.   Constitutional:       Comments: Critically and chronically ill-appearing   HENT:      Head: Normocephalic and atraumatic.      Mouth/Throat:      Comments: ETT in place  Cardiovascular:      Rate and Rhythm: Normal rate and regular rhythm.   Pulmonary:      Comments: Orally intubated on mechanical ventilation FiO2 of 40 with 5 of  PEEP  Abdominal:      Comments: Mildly distended but soft, hypoactive bowel sounds right abdomen, nontender   Genitourinary:     Comments: Wong catheter in place with yellow urine draining  Musculoskeletal:      Cervical back: Neck supple.      Comments: Evidence of severe muscle wasting, cachexia with low BMI   Skin:     Comments: Bruising upper extremities, healed lesions lower extremity.  Right radial arterial line with some oozing noted   Neurological:      Mental Status: She is alert and oriented to person, place, and time.      Comments: On fentanyl but alert, oriented, communicating via written       Significant Labs: Blood Culture:   Recent Labs   Lab 06/17/22  1307 06/17/22  1320   LABBLOO No Growth to date  No Growth to date No Growth to date  No Growth to date     BMP:   Recent Labs   Lab 06/19/22  0514   *   *   K 4.0   CL 87*   CO2 39*   BUN 20   CREATININE 0.3*   CALCIUM 8.2*   MG 1.8     CBC:   Recent Labs   Lab 06/17/22  1307 06/18/22  0304 06/18/22  0411 06/19/22  0510 06/19/22  0514   WBC 17.99* 22.69*  --   --  12.12   HGB 11.0* 9.5*  --   --  9.2*   HCT 37.2 30.8* 32* 31* 29.4*    216  --   --  190     CMP:   Recent Labs   Lab 06/17/22  1307 06/18/22  0304 06/19/22  0514   * 132* 132*   K 4.3 3.7 4.0   CL 80* 85* 87*   CO2 48* 40* 39*   GLU 82 101 115*   BUN 10 15 20   CREATININE 0.3* 0.5 0.3*   CALCIUM 8.5* 7.8* 8.2*   PROT 6.9  --  5.5*   ALBUMIN 3.6  --  2.6*   BILITOT 1.0  --  0.6   ALKPHOS 52*  --  48*   AST 15  --  27   ALT 10  --  25   ANIONGAP 6* 7* 6*   EGFRNONAA >60.0 >60.0 >60.0     Cardiac Markers:   Recent Labs   Lab 06/17/22  1307   BNP 60     Coagulation:   Recent Labs   Lab 06/19/22  0514   INR 1.0     Lactic Acid:   Recent Labs   Lab 06/17/22  1307   LACTATE 0.8     Magnesium:   Recent Labs   Lab 06/17/22  1307 06/18/22  0304 06/19/22  0514   MG 1.6 2.2 1.8     POCT Glucose: No results for input(s): POCTGLUCOSE in the last 48 hours.  Troponin:    Recent Labs   Lab 06/17/22  1307   TROPONINI <0.030     TSH: No results for input(s): TSH in the last 4320 hours.  Urine Culture: No results for input(s): LABURIN in the last 48 hours.  Urine Studies: No results for input(s): COLORU, APPEARANCEUA, PHUR, SPECGRAV, PROTEINUA, GLUCUA, KETONESU, BILIRUBINUA, OCCULTUA, NITRITE, UROBILINOGEN, LEUKOCYTESUR, RBCUA, WBCUA, BACTERIA, SQUAMEPITHEL, HYALINECASTS in the last 48 hours.    Invalid input(s): TRI    Significant Imaging: I have reviewed and interpreted all pertinent imaging results/findings within the past 24 hours.

## 2022-06-19 NOTE — CARE UPDATE
06/18/22 1950   Patient Assessment/Suction   Level of Consciousness (AVPU) responds to voice   Respiratory Effort Normal   Expansion/Accessory Muscles/Retractions no use of accessory muscles   All Lung Fields Breath Sounds wheezes, expiratory   Rhythm/Pattern, Respiratory assisted mechanically   Cough Frequency with stimulation   Cough Type assisted   Suction Method oral;tracheal   Suction Pressure (mmHg) -120 mmHg   $ Suction Charges Inline Suction Procedure Stat Charge   Secretions Amount small   Secretions Color white;clear   Secretions Characteristics thin   PRE-TX-O2   O2 Device (Oxygen Therapy) ventilator   $ Is the patient on Low Flow Oxygen? Yes   Oxygen Concentration (%) 35   SpO2 (!) 93 %   Pulse Oximetry Type Continuous   $ Pulse Oximetry - Multiple Charge Pulse Oximetry - Multiple   Pulse 86   Resp 20   Aerosol Therapy   $ Aerosol Therapy Charges Aerosol Treatment   Daily Review of Necessity (SVN) completed   Respiratory Treatment Status (SVN) given   Treatment Route (SVN) in-line   Patient Position (SVN) HOB elevated   Post Treatment Assessment (SVN) breath sounds unchanged;vital signs unchanged   Signs of Intolerance (SVN) none   Vital Capacity   Vital Capacity (mL) 0   Skin Integrity   $ Wound Care Tech Time 15 min   Area Observed Left;Right;Cheek   Skin Appearance without discoloration        Airway - Non-Surgical 06/17/22 1655 Endotracheal Tube   Placement Date/Time: 06/17/22 1655   Present Prior to Hospital Arrival?: No  Method of Intubation: Rapid Sequence Induction  Inserted by: MD  Staff/Resident Name(s): Dalton  Airway Device: Endotracheal Tube  Mask Ventilation: Easy  Blade: Wang #3...   Secured at 25 cm   Measured At Lips   Secured Location Center   Secured by Commercial tube christian   Bite Block none   Site Condition Cool;Dry   Status Intact;Secured;Patent   Site Assessment Clean;Dry;No bleeding;No drainage   Cuff Volume   (mlt)   Vent Select   Conventional Vent Y   Charged w/in last  24h YES   Preset Conventional Ventilator Settings   Vent Type    Ventilation Type VC   Vent Mode A/C   Humidity HME   Set Rate 20 BPM   Vt Set 400 mL   PEEP/CPAP 5 cmH20   Peak Flow 50 L/min   Peak End Inspiratory Pressure 23 cmH20   I-Trigger Type  V-TRIG   Trigger Sensitivity Flow/I-Trigger 3 L/min   Patient Ventilator Parameters   Resp Rate Total 20 br/min   Peak Airway Pressure 32 cmH2O   Mean Airway Pressure 12 cmH20   Plateau Pressure 0 cmH20   Exhaled Vt 417 mL   Total Ve 8.32 mL   I:E Ratio Measured 1:2.40   Auto PEEP 0 cmH20   Conventional Ventilator Alarms   Ve High Alarm 25 L/min   Ve Low Alarm 2.5 L/min   Vt High Alarm 1200 mL   Vt Low Alarm 200 mL   Resp Rate High Alarm 50 br/min   Press High Alarm 60 cmH2O   Apnea Rate 30   Apnea Volume (mL) 0 mL   Apnea Oxygen Concentration  100   Apnea Flow Rate (L/min) 60   T Apnea 20 sec(s)   IHI Ventilator Associated Pneumonia Bundle (Required)   Head of Bed Elevated  HOB 30   Oral Care Teeth brushed;Mouth suctioned;CHG   Ready to Wean/Extubation Screen   FIO2<=50 (chart decimal) 0.35   MV<16L (chart vol.) 8.32   PEEP <=8 (chart #) 5   Ready to Wean Parameters   F/VT Ratio<105 (RSBI) (!) 47.96   Education   $ Education Ventilator Oxygen;Bronchodilator;15 min   Respiratory Evaluation   $ Care Plan Tech Time 15 min

## 2022-06-19 NOTE — CARE UPDATE
06/19/22 0421   Patient Assessment/Suction   Level of Consciousness (AVPU) alert   PRE-TX-O2   O2 Device (Oxygen Therapy) ventilator   Oxygen Concentration (%) 40   SpO2 (!) 92 %   Pulse 86   Resp (!) 42   Vital Capacity   Vital Capacity (mL) 0   Vent Select   Conventional Vent Y   Charged w/in last 24h YES   Preset Conventional Ventilator Settings   Vent Type    Ventilation Type VC   Vent Mode Spont   PEEP/CPAP 5 cmH20   Pressure Support 10 cmH20   Peak End Inspiratory Pressure 15 cmH20   Insp Rise Time  70 %   I-Trigger Type  V-TRIG   Trigger Sensitivity Flow/I-Trigger 3 L/min   Patient Ventilator Parameters   Resp Rate Total 23 br/min   Peak Airway Pressure 15 cmH2O   Mean Airway Pressure 8 cmH20   Plateau Pressure 0 cmH20   Exhaled Vt 368 mL   Total Ve 7.56 mL   Spont Ve 7.56 L   I:E Ratio Measured 1:2.40   Auto PEEP 0 cmH20   Inspired Tidal Volume (VTI) 0 mL   Conventional Ventilator Alarms   Resp Rate High Alarm 50 br/min   Press High Alarm 60 cmH2O   Apnea Rate 30   Apnea Volume (mL) 0 mL   Apnea Oxygen Concentration  100   Apnea Flow Rate (L/min) 60   T Apnea 20 sec(s)   Ready to Wean/Extubation Screen   FIO2<=50 (chart decimal) 0.4   MV<16L (chart vol.) 7.56   PEEP <=8 (chart #) 5   Ready to Wean Parameters   F/VT Ratio<105 (RSBI) 114.13     Pt placed on cpap @415

## 2022-06-19 NOTE — ASSESSMENT & PLAN NOTE
Acute on chronic  CT is concerning for bowel obstruction with large stool burden, patient is reporting gas pain

## 2022-06-19 NOTE — PLAN OF CARE
Plan of care discussed with patient and sister.  All questions answered, and verbalized understanding of plan of care.  Continue to monitor respiratory status, and wean as tolerated.  Bed low and locked, call light in reach and bed alarm set.  Problem: Adjustment to Illness COPD (Chronic Obstructive Pulmonary Disease)  Goal: Optimal Chronic Illness Coping  Outcome: Ongoing, Progressing     Problem: Functional Ability Impaired COPD (Chronic Obstructive Pulmonary Disease)  Goal: Optimal Level of Functional [place]  Outcome: Ongoing, Progressing     Problem: Infection COPD (Chronic Obstructive Pulmonary Disease)  Goal: Absence of Infection Signs and Symptoms  Outcome: Ongoing, Progressing     Problem: Oral Intake Inadequate COPD (Chronic Obstructive Pulmonary Disease)  Goal: Improved Nutrition Intake  Outcome: Ongoing, Progressing     Problem: Respiratory Compromise COPD (Chronic Obstructive Pulmonary Disease)  Goal: Effective Oxygenation and Ventilation  Outcome: Ongoing, Progressing     Problem: Adult Inpatient Plan of Care  Goal: Plan of Care Review  Outcome: Ongoing, Progressing  Goal: Patient-Specific Goal (Individualized)  Outcome: Ongoing, Progressing  Goal: Absence of Hospital-Acquired Illness or Injury  Outcome: Ongoing, Progressing  Goal: Optimal Comfort and Wellbeing  Outcome: Ongoing, Progressing  Goal: Readiness for Transition of Care  Outcome: Ongoing, Progressing     Problem: Infection  Goal: Absence of Infection Signs and Symptoms  Outcome: Ongoing, Progressing     Problem: Fluid Imbalance (Pneumonia)  Goal: Fluid Balance  Outcome: Ongoing, Progressing     Problem: Infection (Pneumonia)  Goal: Resolution of Infection Signs and Symptoms  Outcome: Ongoing, Progressing     Problem: Respiratory Compromise (Pneumonia)  Goal: Effective Oxygenation and Ventilation  Outcome: Ongoing, Progressing     Problem: Noninvasive Ventilation Acute  Goal: Effective Unassisted Ventilation and Oxygenation  Outcome:  Ongoing, Progressing     Problem: Skin Injury Risk Increased  Goal: Skin Health and Integrity  Outcome: Ongoing, Progressing

## 2022-06-19 NOTE — PROGRESS NOTES
Pt seen and examind.  Pt alert.  Fentanyl drip off.   NO complaints of abd pain  Plans to extubated today.        Wt Readings from Last 3 Encounters:   06/17/22 47.6 kg (104 lb 15 oz)   06/18/22 47.2 kg (104 lb)     Temp Readings from Last 3 Encounters:   06/19/22 96.9 °F (36.1 °C) (Axillary)     BP Readings from Last 3 Encounters:   06/19/22 124/78     Pulse Readings from Last 3 Encounters:   06/19/22 78     Awake  Sinus  Soft/nd/nt  2+ pulses     Ct reviewed and d/w radiology.  Stool in colon is distal to anastomosis.      Does not appear consistent with obstruction but rather constipation or colonic paralysis possibly from denervation    A/P: constipation    No acute surgical issues.  D/w nursing.  Will order enema  Once d/c and diet advanced would recommend miralax prn for constipation  Surgery to sign off

## 2022-06-20 PROBLEM — K56.609 BOWEL OBSTRUCTION: Status: RESOLVED | Noted: 2022-06-19 | Resolved: 2022-06-20

## 2022-06-20 PROBLEM — E87.6 HYPOKALEMIA: Status: ACTIVE | Noted: 2022-06-20

## 2022-06-20 LAB
ALBUMIN SERPL BCP-MCNC: 2.6 G/DL (ref 3.5–5.2)
ALLENS TEST: ABNORMAL
ALP SERPL-CCNC: 45 U/L (ref 55–135)
ALT SERPL W/O P-5'-P-CCNC: 22 U/L (ref 10–44)
ANION GAP SERPL CALC-SCNC: 5 MMOL/L (ref 8–16)
AST SERPL-CCNC: 23 U/L (ref 10–40)
BACTERIA SPEC AEROBE CULT: NO GROWTH
BACTERIA SPEC AEROBE CULT: NORMAL
BASOPHILS # BLD AUTO: 0.01 K/UL (ref 0–0.2)
BASOPHILS NFR BLD: 0.1 % (ref 0–1.9)
BILIRUB SERPL-MCNC: 0.6 MG/DL (ref 0.1–1)
BUN SERPL-MCNC: 20 MG/DL (ref 6–20)
CALCIUM SERPL-MCNC: 7.8 MG/DL (ref 8.7–10.5)
CHLORIDE SERPL-SCNC: 87 MMOL/L (ref 95–110)
CO2 SERPL-SCNC: 41 MMOL/L (ref 23–29)
CREAT SERPL-MCNC: 0.3 MG/DL (ref 0.5–1.4)
DELSYS: ABNORMAL
DIFFERENTIAL METHOD: ABNORMAL
EOSINOPHIL # BLD AUTO: 0 K/UL (ref 0–0.5)
EOSINOPHIL NFR BLD: 0 % (ref 0–8)
EP: 8
ERYTHROCYTE [DISTWIDTH] IN BLOOD BY AUTOMATED COUNT: 15.7 % (ref 11.5–14.5)
ERYTHROCYTE [SEDIMENTATION RATE] IN BLOOD BY WESTERGREN METHOD: 20 MM/H
EST. GFR  (AFRICAN AMERICAN): >60 ML/MIN/1.73 M^2
EST. GFR  (NON AFRICAN AMERICAN): >60 ML/MIN/1.73 M^2
FIO2: 35
GLUCOSE SERPL-MCNC: 88 MG/DL (ref 70–110)
GRAM STN SPEC: NORMAL
HCO3 UR-SCNC: 46.4 MMOL/L (ref 24–28)
HCT VFR BLD AUTO: 30.2 % (ref 37–48.5)
HGB BLD-MCNC: 9.6 G/DL (ref 12–16)
IMM GRANULOCYTES # BLD AUTO: 0.05 K/UL (ref 0–0.04)
IMM GRANULOCYTES NFR BLD AUTO: 0.4 % (ref 0–0.5)
IP: 18
LYMPHOCYTES # BLD AUTO: 1.7 K/UL (ref 1–4.8)
LYMPHOCYTES NFR BLD: 13.8 % (ref 18–48)
MAGNESIUM SERPL-MCNC: 1.7 MG/DL (ref 1.6–2.6)
MAGNESIUM SERPL-MCNC: 1.9 MG/DL (ref 1.6–2.6)
MAGNESIUM SERPL-MCNC: 1.9 MG/DL (ref 1.6–2.6)
MCH RBC QN AUTO: 28.2 PG (ref 27–31)
MCHC RBC AUTO-ENTMCNC: 31.8 G/DL (ref 32–36)
MCV RBC AUTO: 89 FL (ref 82–98)
MIN VOL: 9
MODE: ABNORMAL
MONOCYTES # BLD AUTO: 0.7 K/UL (ref 0.3–1)
MONOCYTES NFR BLD: 5.4 % (ref 4–15)
NEUTROPHILS # BLD AUTO: 9.7 K/UL (ref 1.8–7.7)
NEUTROPHILS NFR BLD: 80.3 % (ref 38–73)
NRBC BLD-RTO: 0 /100 WBC
PCO2 BLDA: 80.6 MMHG (ref 35–45)
PH SMN: 7.37 [PH] (ref 7.35–7.45)
PHOSPHATE SERPL-MCNC: 4.7 MG/DL (ref 2.7–4.5)
PLATELET # BLD AUTO: 220 K/UL (ref 150–450)
PMV BLD AUTO: 8.6 FL (ref 9.2–12.9)
PO2 BLDA: 48 MMHG (ref 40–60)
POC BE: 21 MMOL/L
POC SATURATED O2: 79 % (ref 95–100)
POC TCO2: 49 MMOL/L (ref 24–29)
POTASSIUM SERPL-SCNC: 3.3 MMOL/L (ref 3.5–5.1)
POTASSIUM SERPL-SCNC: 4.1 MMOL/L (ref 3.5–5.1)
PREALB SERPL-MCNC: 13 MG/DL (ref 20–43)
PROT SERPL-MCNC: 5.4 G/DL (ref 6–8.4)
RBC # BLD AUTO: 3.4 M/UL (ref 4–5.4)
SAMPLE: ABNORMAL
SITE: ABNORMAL
SODIUM SERPL-SCNC: 133 MMOL/L (ref 136–145)
SP02: 93
SPONT RATE: 20
TSH SERPL DL<=0.005 MIU/L-ACNC: 1.94 UIU/ML (ref 0.34–5.6)
WBC # BLD AUTO: 12.12 K/UL (ref 3.9–12.7)

## 2022-06-20 PROCEDURE — 63600175 PHARM REV CODE 636 W HCPCS: Performed by: INTERNAL MEDICINE

## 2022-06-20 PROCEDURE — 97530 THERAPEUTIC ACTIVITIES: CPT

## 2022-06-20 PROCEDURE — 99900031 HC PATIENT EDUCATION (STAT)

## 2022-06-20 PROCEDURE — 36415 COLL VENOUS BLD VENIPUNCTURE: CPT | Performed by: FAMILY MEDICINE

## 2022-06-20 PROCEDURE — 99233 PR SUBSEQUENT HOSPITAL CARE,LEVL III: ICD-10-PCS | Mod: ,,, | Performed by: NURSE PRACTITIONER

## 2022-06-20 PROCEDURE — 99900035 HC TECH TIME PER 15 MIN (STAT)

## 2022-06-20 PROCEDURE — 84134 ASSAY OF PREALBUMIN: CPT | Performed by: FAMILY MEDICINE

## 2022-06-20 PROCEDURE — 84132 ASSAY OF SERUM POTASSIUM: CPT | Performed by: INTERNAL MEDICINE

## 2022-06-20 PROCEDURE — 83735 ASSAY OF MAGNESIUM: CPT | Performed by: FAMILY MEDICINE

## 2022-06-20 PROCEDURE — 94640 AIRWAY INHALATION TREATMENT: CPT

## 2022-06-20 PROCEDURE — 63600175 PHARM REV CODE 636 W HCPCS: Performed by: FAMILY MEDICINE

## 2022-06-20 PROCEDURE — 83735 ASSAY OF MAGNESIUM: CPT | Mod: 91 | Performed by: INTERNAL MEDICINE

## 2022-06-20 PROCEDURE — B4185 PARENTERAL SOL 10 GM LIPIDS: HCPCS | Performed by: INTERNAL MEDICINE

## 2022-06-20 PROCEDURE — 20000000 HC ICU ROOM

## 2022-06-20 PROCEDURE — 94799 UNLISTED PULMONARY SVC/PX: CPT

## 2022-06-20 PROCEDURE — 93010 ELECTROCARDIOGRAM REPORT: CPT | Mod: ,,, | Performed by: SPECIALIST

## 2022-06-20 PROCEDURE — 25000242 PHARM REV CODE 250 ALT 637 W/ HCPCS: Performed by: FAMILY MEDICINE

## 2022-06-20 PROCEDURE — 85025 COMPLETE CBC W/AUTO DIFF WBC: CPT | Performed by: FAMILY MEDICINE

## 2022-06-20 PROCEDURE — 25000003 PHARM REV CODE 250: Performed by: INTERNAL MEDICINE

## 2022-06-20 PROCEDURE — 84443 ASSAY THYROID STIM HORMONE: CPT | Performed by: FAMILY MEDICINE

## 2022-06-20 PROCEDURE — 97162 PT EVAL MOD COMPLEX 30 MIN: CPT

## 2022-06-20 PROCEDURE — 80053 COMPREHEN METABOLIC PANEL: CPT | Performed by: INTERNAL MEDICINE

## 2022-06-20 PROCEDURE — 94761 N-INVAS EAR/PLS OXIMETRY MLT: CPT

## 2022-06-20 PROCEDURE — A4217 STERILE WATER/SALINE, 500 ML: HCPCS | Performed by: INTERNAL MEDICINE

## 2022-06-20 PROCEDURE — 82803 BLOOD GASES ANY COMBINATION: CPT

## 2022-06-20 PROCEDURE — 93010 EKG 12-LEAD: ICD-10-PCS | Mod: ,,, | Performed by: SPECIALIST

## 2022-06-20 PROCEDURE — 99233 SBSQ HOSP IP/OBS HIGH 50: CPT | Mod: ,,, | Performed by: NURSE PRACTITIONER

## 2022-06-20 PROCEDURE — 93005 ELECTROCARDIOGRAM TRACING: CPT | Performed by: SPECIALIST

## 2022-06-20 PROCEDURE — 25000003 PHARM REV CODE 250: Performed by: FAMILY MEDICINE

## 2022-06-20 PROCEDURE — 99291 PR CRITICAL CARE, E/M 30-74 MINUTES: ICD-10-PCS | Mod: ,,, | Performed by: INTERNAL MEDICINE

## 2022-06-20 PROCEDURE — 25000003 PHARM REV CODE 250: Performed by: GENERAL PRACTICE

## 2022-06-20 PROCEDURE — 99291 CRITICAL CARE FIRST HOUR: CPT | Mod: ,,, | Performed by: INTERNAL MEDICINE

## 2022-06-20 PROCEDURE — 36569 INSJ PICC 5 YR+ W/O IMAGING: CPT

## 2022-06-20 PROCEDURE — 84100 ASSAY OF PHOSPHORUS: CPT | Performed by: FAMILY MEDICINE

## 2022-06-20 PROCEDURE — 94660 CPAP INITIATION&MGMT: CPT

## 2022-06-20 PROCEDURE — S4991 NICOTINE PATCH NONLEGEND: HCPCS | Performed by: INTERNAL MEDICINE

## 2022-06-20 RX ORDER — SIMETHICONE 80 MG
1 TABLET,CHEWABLE ORAL 3 TIMES DAILY PRN
Status: DISCONTINUED | OUTPATIENT
Start: 2022-06-20 | End: 2022-06-25 | Stop reason: HOSPADM

## 2022-06-20 RX ORDER — MAGNESIUM SULFATE 1 G/100ML
1 INJECTION INTRAVENOUS ONCE
Status: COMPLETED | OUTPATIENT
Start: 2022-06-20 | End: 2022-06-20

## 2022-06-20 RX ORDER — NAPROXEN SODIUM 220 MG/1
81 TABLET, FILM COATED ORAL DAILY
Status: DISCONTINUED | OUTPATIENT
Start: 2022-06-21 | End: 2022-06-25 | Stop reason: HOSPADM

## 2022-06-20 RX ORDER — OXYCODONE AND ACETAMINOPHEN 10; 325 MG/1; MG/1
1 TABLET ORAL EVERY 6 HOURS PRN
Status: DISCONTINUED | OUTPATIENT
Start: 2022-06-20 | End: 2022-06-25 | Stop reason: HOSPADM

## 2022-06-20 RX ORDER — MUPIROCIN 20 MG/G
OINTMENT TOPICAL 2 TIMES DAILY
Status: DISPENSED | OUTPATIENT
Start: 2022-06-20 | End: 2022-06-25

## 2022-06-20 RX ORDER — ETOMIDATE 2 MG/ML
12 INJECTION INTRAVENOUS
Status: SHIPPED | OUTPATIENT
Start: 2022-06-17 | End: 2022-06-18

## 2022-06-20 RX ORDER — SUCCINYLCHOLINE CHLORIDE 20 MG/ML
60 INJECTION INTRAMUSCULAR; INTRAVENOUS
Status: SHIPPED | OUTPATIENT
Start: 2022-06-17 | End: 2022-06-18

## 2022-06-20 RX ORDER — LABETALOL HYDROCHLORIDE 5 MG/ML
10 INJECTION, SOLUTION INTRAVENOUS EVERY 6 HOURS PRN
Status: DISCONTINUED | OUTPATIENT
Start: 2022-06-20 | End: 2022-06-25 | Stop reason: HOSPADM

## 2022-06-20 RX ORDER — LABETALOL HYDROCHLORIDE 5 MG/ML
10 INJECTION, SOLUTION INTRAVENOUS EVERY 6 HOURS PRN
Status: DISCONTINUED | OUTPATIENT
Start: 2022-06-20 | End: 2022-06-20

## 2022-06-20 RX ORDER — NICOTINE 7MG/24HR
1 PATCH, TRANSDERMAL 24 HOURS TRANSDERMAL DAILY
Status: DISCONTINUED | OUTPATIENT
Start: 2022-06-20 | End: 2022-06-25 | Stop reason: HOSPADM

## 2022-06-20 RX ORDER — CHLORHEXIDINE GLUCONATE ORAL RINSE 1.2 MG/ML
15 SOLUTION DENTAL 2 TIMES DAILY
Status: COMPLETED | OUTPATIENT
Start: 2022-06-20 | End: 2022-06-24

## 2022-06-20 RX ADMIN — IPRATROPIUM BROMIDE AND ALBUTEROL SULFATE 3 ML: .5; 3 SOLUTION RESPIRATORY (INHALATION) at 03:06

## 2022-06-20 RX ADMIN — OXYCODONE HYDROCHLORIDE AND ACETAMINOPHEN 1 TABLET: 10; 325 TABLET ORAL at 01:06

## 2022-06-20 RX ADMIN — MUPIROCIN: 20 OINTMENT TOPICAL at 09:06

## 2022-06-20 RX ADMIN — SMOFLIPID: 6; 6; 5; 3 INJECTION, EMULSION INTRAVENOUS at 01:06

## 2022-06-20 RX ADMIN — ALPRAZOLAM 0.25 MG: 0.25 TABLET ORAL at 03:06

## 2022-06-20 RX ADMIN — METHYLPREDNISOLONE SODIUM SUCCINATE 40 MG: 40 INJECTION, POWDER, FOR SOLUTION INTRAMUSCULAR; INTRAVENOUS at 10:06

## 2022-06-20 RX ADMIN — SIMETHICONE 80 MG: 80 TABLET, CHEWABLE ORAL at 10:06

## 2022-06-20 RX ADMIN — MEROPENEM AND SODIUM CHLORIDE 1 G: 1 INJECTION, SOLUTION INTRAVENOUS at 10:06

## 2022-06-20 RX ADMIN — LATANOPROST 1 DROP: 50 SOLUTION OPHTHALMIC at 10:06

## 2022-06-20 RX ADMIN — METHYLPREDNISOLONE SODIUM SUCCINATE 40 MG: 40 INJECTION, POWDER, FOR SOLUTION INTRAMUSCULAR; INTRAVENOUS at 09:06

## 2022-06-20 RX ADMIN — OXYCODONE HYDROCHLORIDE AND ACETAMINOPHEN 1 TABLET: 10; 325 TABLET ORAL at 07:06

## 2022-06-20 RX ADMIN — IPRATROPIUM BROMIDE AND ALBUTEROL SULFATE 3 ML: .5; 3 SOLUTION RESPIRATORY (INHALATION) at 08:06

## 2022-06-20 RX ADMIN — MAGNESIUM SULFATE 1 G: 1 INJECTION INTRAVENOUS at 01:06

## 2022-06-20 RX ADMIN — MEROPENEM AND SODIUM CHLORIDE 1 G: 1 INJECTION, SOLUTION INTRAVENOUS at 02:06

## 2022-06-20 RX ADMIN — IPRATROPIUM BROMIDE AND ALBUTEROL SULFATE 3 ML: .5; 3 SOLUTION RESPIRATORY (INHALATION) at 11:06

## 2022-06-20 RX ADMIN — CHLORHEXIDINE GLUCONATE 15 ML: 1.2 RINSE ORAL at 09:06

## 2022-06-20 RX ADMIN — POTASSIUM CHLORIDE 60 MEQ: 14.9 INJECTION, SOLUTION INTRAVENOUS at 04:06

## 2022-06-20 RX ADMIN — GUAIFENESIN 1200 MG: 600 TABLET, EXTENDED RELEASE ORAL at 09:06

## 2022-06-20 RX ADMIN — SIMETHICONE 80 MG: 80 TABLET, CHEWABLE ORAL at 07:06

## 2022-06-20 RX ADMIN — IPRATROPIUM BROMIDE AND ALBUTEROL SULFATE 3 ML: .5; 3 SOLUTION RESPIRATORY (INHALATION) at 12:06

## 2022-06-20 RX ADMIN — MEROPENEM AND SODIUM CHLORIDE 1 G: 1 INJECTION, SOLUTION INTRAVENOUS at 05:06

## 2022-06-20 RX ADMIN — NICOTINE 1 PATCH: 7 PATCH, EXTENDED RELEASE TRANSDERMAL at 09:06

## 2022-06-20 RX ADMIN — LABETALOL HYDROCHLORIDE 10 MG: 5 INJECTION, SOLUTION INTRAVENOUS at 02:06

## 2022-06-20 RX ADMIN — METOPROLOL TARTRATE 25 MG: 25 TABLET, FILM COATED ORAL at 10:06

## 2022-06-20 RX ADMIN — ALPRAZOLAM 0.25 MG: 0.25 TABLET ORAL at 10:06

## 2022-06-20 RX ADMIN — METOPROLOL TARTRATE 25 MG: 25 TABLET, FILM COATED ORAL at 08:06

## 2022-06-20 RX ADMIN — MORPHINE SULFATE 2 MG: 2 INJECTION, SOLUTION INTRAMUSCULAR; INTRAVENOUS at 02:06

## 2022-06-20 RX ADMIN — ALPRAZOLAM 0.25 MG: 0.25 TABLET ORAL at 08:06

## 2022-06-20 RX ADMIN — MAGNESIUM SULFATE HEPTAHYDRATE 2 G: 40 INJECTION, SOLUTION INTRAVENOUS at 03:06

## 2022-06-20 RX ADMIN — MUPIROCIN: 20 OINTMENT TOPICAL at 10:06

## 2022-06-20 RX ADMIN — ENOXAPARIN SODIUM 40 MG: 40 INJECTION SUBCUTANEOUS at 04:06

## 2022-06-20 RX ADMIN — IPRATROPIUM BROMIDE AND ALBUTEROL SULFATE 3 ML: .5; 3 SOLUTION RESPIRATORY (INHALATION) at 07:06

## 2022-06-20 RX ADMIN — CHLORHEXIDINE GLUCONATE 15 ML: 1.2 RINSE ORAL at 10:06

## 2022-06-20 NOTE — PLAN OF CARE
Problem: Parenteral Nutrition  Goal: Effective Intravenous Nutrition Therapy Delivery  Outcome: Ongoing, Progressing  Intervention: Optimize Intravenous Nutrition Delivery  Flowsheets (Taken 6/20/2022 1031)  Nutrition Support Management: parenteral nutrition initiated

## 2022-06-20 NOTE — ASSESSMENT & PLAN NOTE
Chronic issue.  There was concern for bowel obstruction, surgery following, KUB this morning with nonobstructive bowel gas pattern  Central line in place, start TPN, consult for PICC line

## 2022-06-20 NOTE — PROGRESS NOTES
Progress Note  PULMONARY    Admit Date: 6/17/2022 06/20/2022  History of Present Illness:  Pt is a 57 yo female with COPD, malnutrition who presented to ED with resp distress and experienced code blue after arrival requiring 10min CPR, mag for torsades then had ROSC, intubated and was waking up and responsive. Pt admitted to ICU and pulmonary is consulted for further management/recommendations.  Further history is obtained over the phone with pt's sister and RAMIREZ Knightor Khanh. She states pt was living in [place] where she had a pulmonologist caring for her, had very poor health with frequent admissions lately for COPD with CO2 retention (10 times so far this year) and moved to [place] to stay w/ sister 4 days ago. She developed acute on chronic abdominal pain and distention which made it difficult for her to breathe then had difficulty keeping sats up with home O2- uses 3L continuously. She was supposed to have NIV for home but had difficulty tolerating it and then difficulty getting it in [place]. Pt has had SBO requiring bowel resection 12/2021 and has had complications with abd pain, trouble eating off and on since then. She is noted to have SBO on this admission and surgery is consulted for further recommendations. Pt is a current smoker and wants to quit.       SUBJECTIVE:     6/19- pt waking up and writing notes to staff, continues on vent. cpap trial passed    6/20/2022 - Stable overnight but has remained on BiPAP.  In no distress.  She is anxious.  She shows very little insight into her overall health condition.  No new issues reported.  Had small BM yesterday but does feel that her abdomen is distended.  She is on Clinimix - to have PICC line placed (does have R CVL)      OBJECTIVE:     Vitals (Most recent):  Vitals:    06/20/22 0805   BP: 122/85   Pulse: 96   Resp:    Temp:        Vitals (24 hour range):  Temp:  [97.7 °F (36.5 °C)-98 °F (36.7 °C)]   Pulse:  []   Resp:  [14-31]   BP: (114-192)/(60-88)    SpO2:  [91 %-97 %]   Arterial Line BP: (124-133)/(61-71)       Intake/Output Summary (Last 24 hours) at 6/20/2022 0833  Last data filed at 6/20/2022 0600  Gross per 24 hour   Intake 2232.73 ml   Output 3515 ml   Net -1282.27 ml          Physical Exam:  The patient's neuro status (alertness,orientation,cognitive function,motor skills,), pharyngeal exam (oral lesions, hygiene, abn dentition,), Neck (jvd,mass,thyroid,nodes in neck and above/below clavicle),RESPIRATORY(symmetry,effort,fremitus,percussion,auscultation),  Cor(rhythm,heart tones including gallops,perfusion,edema)ABD(distention,hepatic&splenomegaly,tenderness,masses), Skin(rash,cyanosis),Psyc(affect,judgement,).  Exam negative except for these pertinent findings:      Awake, alert, on BiPAP  Clear but diminished breath sounds, + Ansari's sign  Abdomen is soft, nontender, BS+  No edema  Thin, cachectic   Abdomen distended with decreased BS, nontender  Bruising bilat arms    Radiographs reviewed: view by direct vision   CXR 6/19- hyperinflated, no significant change    Labs     Recent Labs   Lab 06/20/22 0229   WBC 12.12   HGB 9.6*   HCT 30.2*        Recent Labs   Lab 06/20/22 0229   *   K 3.3*   CL 87*   CO2 41*   BUN 20   CREATININE 0.3*   GLU 88   CALCIUM 7.8*   MG 1.7   PHOS 4.7*   AST 23   ALT 22   ALKPHOS 45*   BILITOT 0.6   PROT 5.4*   ALBUMIN 2.6*     Recent Labs   Lab 06/20/22  0352   PH 7.368   PCO2 80.6*   PO2 48   HCO3 46.4*     Microbiology Results (last 7 days)     Procedure Component Value Units Date/Time    Culture, Respiratory with Gram Stain [[id number]] Collected: 06/18/22 1535    Order Status: Completed Specimen: Respiratory from Tracheal Aspirate Updated: 06/20/22 0610     Respiratory Culture No normal respiratory mane      No growth     Gram Stain (Respiratory) Many WBC's     Gram Stain (Respiratory) <10 epithelial cells per low power field.     Gram Stain (Respiratory) No organisms seen    Blood culture #1 **CANNOT BE  ORDERED STAT** [[id number]] Collected: 06/17/22 1307    Order Status: Completed Specimen: Blood from Peripheral, Antecubital, Left Updated: 06/19/22 1432     Blood Culture, Routine No Growth to date      No Growth to date      No Growth to date    Blood culture #2 **CANNOT BE ORDERED STAT** [[id number]] Collected: 06/17/22 1320    Order Status: Completed Specimen: Blood from Peripheral, Antecubital, Right Updated: 06/19/22 1432     Blood Culture, Routine No Growth to date      No Growth to date      No Growth to date    MRSA Screen by PCR [[id number]] Collected: 06/18/22 0121    Order Status: Completed Specimen: Nasopharyngeal Swab from Nasal Updated: 06/18/22 0358     MRSA SCREEN BY PCR Negative          Impression:  Active Hospital Problems    Diagnosis  POA    *Acute on chronic respiratory failure requiring mechanical ventilation [J96.10]  Yes    Cardiomyopathy [I42.9]  Yes     Chronic    QT prolongation [R94.31]  Yes     Chronic    Possible bowel obstruction  [K56.609]  Yes    VT (ventricular tachycardia) [I47.2]  Clinically Undetermined    BALDEMAR (generalized anxiety disorder) [F41.1]  Yes     Chronic    Severe malnutrition [E43]  Yes     Chronic    COPD (chronic obstructive pulmonary disease) [J44.9]  Yes     Chronic    Anemia [D64.9]  Yes     Chronic    Hyponatremia [E87.1]  No    VT/torsades cardiac arrest with ROSC [I46.9]  Yes     Patient coded in emergency department  Intubated  Admit to ICU  Consult to Critical Care      Right lung pneumonia, likely aspiration [J18.9]  Clinically Undetermined    Smoker [F17.200]  Yes    Abdominal pain [R10.9]  Yes    BMI less than 19,adult [Z68.1]  Not Applicable    Obstructive sleep apnea syndrome [G47.33]  Yes      Resolved Hospital Problems    Diagnosis Date Resolved POA    Leucocytosis [D72.829] 06/19/2022 Yes               Plan:     - BiPAP as needed and when sleeping, O2 as able - her baseline paCO2 is probably 70-80  - caution with sedating meds and  opiates given severe respiratory failure  - continue inhaled bronchodilators  - decrease solumedrol to 40mg daily  - TPN for nutritional support and advance diet as able  - check TSH  - continue meropenem for pneumonia  - f/u sputum culture  - pepcid for GI prophylaxis  - lovenox for DVT prophylaxis- overall prognosis is very poor with severe lung disease, severe CM, severe malnutrition    Critical Care Time    I have spent > 35 minutes providing critical care services for this pt for the above diagnoses.  These services have included pt evaluation, pt exam, BiPAP assessment, discussions with staff, chart review, data review, note preparation and .  Medications have been reviewed and adjusted as needed.  The patient has life threatening illness with a high risk of decompensation and/or death.      Delfino Cano MD  [facility] Pulmonary/Critical Care                                          .

## 2022-06-20 NOTE — CARE UPDATE
Plans are to get her off bipap , seems anxious. Continue tx   06/20/22 0745   Patient Assessment/Suction   Level of Consciousness (AVPU) alert   Respiratory Effort Normal;Unlabored   Expansion/Accessory Muscles/Retractions no use of accessory muscles;expansion symmetric   All Lung Fields Breath Sounds diminished   NINA Breath Sounds diminished   LLL Breath Sounds diminished   RUL Breath Sounds diminished   RML Breath Sounds diminished   RLL Breath Sounds diminished   Rhythm/Pattern, Respiratory assisted mechanically   Cough Frequency infrequent   PRE-TX-O2   O2 Device (Oxygen Therapy) BiPAP   Oxygen Concentration (%) 35   SpO2 95 %   Pulse Oximetry Type Continuous   $ Pulse Oximetry - Multiple Charge Pulse Oximetry - Multiple   Pulse 97   Resp 20   Aerosol Therapy   $ Aerosol Therapy Charges Aerosol Treatment   Daily Review of Necessity (SVN) completed   Respiratory Treatment Status (SVN) given   Treatment Route (SVN) in-line   Patient Position (SVN) HOB elevated   Post Treatment Assessment (SVN) breath sounds unchanged   Signs of Intolerance (SVN) none   Skin Integrity   $ Wound Care Tech Time 15 min   Area Observed Bridge of nose   Skin Appearance without discoloration   Barrier used? Gel Cushion   Ready to Wean/Extubation Screen   FIO2<=50 (chart decimal) 0.35   Preset CPAP/BiPAP Settings   Mode Of Delivery BiPAP S/T   $ CPAP/BiPAP Daily Charge BiPAP/CPAP Daily   $ Initial CPAP/BiPAP Setup? No   $ Is patient using? Yes   Size of Mask Small   Sized Appropriately? Yes   Equipment Type V60   Ipap 18   EPAP (cm H2O) 8   Pressure Support (cm H2O) 10   Set Rate (Breaths/Min) 20   ITime (sec) 1   Rise Time (sec) 3   Patient CPAP/BiPAP Settings   RR Total (Breaths/Min) 20   Tidal Volume (mL) 435   VE Minute Ventilation (L/min) 9.1 L/min   Peak Inspiratory Pressure (cm H2O) 18   TiTOT (%) 30   Total Leak (L/Min) 15   Patient Trigger - ST Mode Only (%) 48   CPAP/BiPAP Alarms   High Pressure (cm H2O) 40   Low Pressure (cm  H2O) 10   Minute Ventilation (L/Min) 3   High RR (breaths/min) 40   Low RR (breaths/min) 10   Apnea (Sec) 20   Respiratory Evaluation   $ Care Plan Tech Time 15 min   $ Eval/Re-eval Charges Re-evaluation

## 2022-06-20 NOTE — ASSESSMENT & PLAN NOTE
Respiratory sample with no pathogens but radiological evidence of right lung pneumonia and suspect aspiration  Continue meropenem, adjust as needed

## 2022-06-20 NOTE — ASSESSMENT & PLAN NOTE
Acute on chronic  CT is concerning for bowel obstruction with large stool burden, seen by General surgery and given enema on 06/19  KUB 6/20 nonobstructive bowel gas pattern  Trial of clear liquid diet and serial abdominal examination

## 2022-06-20 NOTE — PLAN OF CARE
Plan of Care and Education Reviewed with patient and her sister at bedside. Verbalization of understanding and discussion of continuity of care noted. Pt eager to participate in physical and occupational therapy today. Bed locked in low position, call light within reach.   Problem: Adjustment to Illness COPD (Chronic Obstructive Pulmonary Disease)  Goal: Optimal Chronic Illness Coping  Outcome: Ongoing, Progressing     Problem: Functional Ability Impaired COPD (Chronic Obstructive Pulmonary Disease)  Goal: Optimal Level of Functional [place]  Outcome: Ongoing, Progressing     Problem: Infection COPD (Chronic Obstructive Pulmonary Disease)  Goal: Absence of Infection Signs and Symptoms  Outcome: Ongoing, Progressing     Problem: Oral Intake Inadequate COPD (Chronic Obstructive Pulmonary Disease)  Goal: Improved Nutrition Intake  Outcome: Ongoing, Progressing     Problem: Respiratory Compromise COPD (Chronic Obstructive Pulmonary Disease)  Goal: Effective Oxygenation and Ventilation  Outcome: Ongoing, Progressing     Problem: Adult Inpatient Plan of Care  Goal: Plan of Care Review  Outcome: Ongoing, Progressing  Goal: Patient-Specific Goal (Individualized)  Outcome: Ongoing, Progressing  Goal: Absence of Hospital-Acquired Illness or Injury  Outcome: Ongoing, Progressing  Goal: Optimal Comfort and Wellbeing  Outcome: Ongoing, Progressing  Goal: Readiness for Transition of Care  Outcome: Ongoing, Progressing     Problem: Infection  Goal: Absence of Infection Signs and Symptoms  Outcome: Ongoing, Progressing     Problem: Fluid Imbalance (Pneumonia)  Goal: Fluid Balance  Outcome: Ongoing, Progressing     Problem: Infection (Pneumonia)  Goal: Resolution of Infection Signs and Symptoms  Outcome: Ongoing, Progressing     Problem: Respiratory Compromise (Pneumonia)  Goal: Effective Oxygenation and Ventilation  Outcome: Ongoing, Progressing     Problem: Communication Impairment (Mechanical Ventilation, Invasive)  Goal:  Effective Communication  Outcome: Ongoing, Progressing     Problem: Device-Related Complication Risk (Mechanical Ventilation, Invasive)  Goal: Optimal Device Function  Outcome: Ongoing, Progressing     Problem: Inability to Wean (Mechanical Ventilation, Invasive)  Goal: Mechanical Ventilation Liberation  Outcome: Ongoing, Progressing     Problem: Nutrition Impairment (Mechanical Ventilation, Invasive)  Goal: Optimal Nutrition Delivery  Outcome: Ongoing, Progressing     Problem: Skin and Tissue Injury (Mechanical Ventilation, Invasive)  Goal: Absence of Device-Related Skin and Tissue Injury  Outcome: Ongoing, Progressing     Problem: Ventilator-Induced Lung Injury (Mechanical Ventilation, Invasive)  Goal: Absence of Ventilator-Induced Lung Injury  Outcome: Ongoing, Progressing     Problem: Communication Impairment (Artificial Airway)  Goal: Effective Communication  Outcome: Ongoing, Progressing     Problem: Device-Related Complication Risk (Artificial Airway)  Goal: Optimal Device Function  Outcome: Ongoing, Progressing     Problem: Skin and Tissue Injury (Artificial Airway)  Goal: Absence of Device-Related Skin or Tissue Injury  Outcome: Ongoing, Progressing     Problem: Noninvasive Ventilation Acute  Goal: Effective Unassisted Ventilation and Oxygenation  Outcome: Ongoing, Progressing     Problem: Skin Injury Risk Increased  Goal: Skin Health and Integrity  Outcome: Ongoing, Progressing     Problem: Fall Injury Risk  Goal: Absence of Fall and Fall-Related Injury  Outcome: Ongoing, Progressing     Problem: Parenteral Nutrition  Goal: Effective Intravenous Nutrition Therapy Delivery  Outcome: Ongoing, Progressing

## 2022-06-20 NOTE — PLAN OF CARE
Goals to be met by: 2022     Patient will increase functional independence with mobility by performin. Supine to sit with Supervision  2. Sit to stand transfer with Supervision  3. Bed to chair transfer with Supervision using Rolling Walker  4. Gait  x 75 feet with Supervision using Rolling Walker.

## 2022-06-20 NOTE — PROGRESS NOTES
Hospitalist notified of increase in pt BP. Order given for Labetalol received and given as specified. F/u B/P showing efficacy of med. Will continue to monitor.

## 2022-06-20 NOTE — SUBJECTIVE & OBJECTIVE
Interval History:  Patient was extubated to BiPAP yesterday.  She is currently on nasal cannula to receive morning medications but does have increased work of breathing, does admit to shortness of breath.  Does report generalized abdominal discomfort, mild nausea but no emesis, states she is only passing small amount of flatus.  Described pain as gas pain.  Afebrile, blood pressure increased last night.  Labs with hemoglobin 9.6, potassium 3.3, BUN/creatinine 20/0.3, magnesium 1.7.  ABG this morning 7.36/80/48/21.  Respiratory sample with no normal respiratory mane.  Chest x-ray with hyperexpanded lungs.  .  Documented urine output 3515 cc.  Sister present at bedside.  Discussed with nursing.     Review of Systems   Constitutional:  Negative for fever.   Respiratory:  Positive for shortness of breath.    Gastrointestinal:  Positive for abdominal pain and nausea.   Neurological:  Positive for weakness.   Psychiatric/Behavioral:  The patient is nervous/anxious.    Objective:     Vital Signs (Most Recent):  Temp: 97.7 °F (36.5 °C) (06/20/22 0315)  Pulse: 96 (06/20/22 0805)  Resp: 20 (06/20/22 0745)  BP: 122/85 (06/20/22 0805)  SpO2: 95 % (06/20/22 0745) Vital Signs (24h Range):  Temp:  [97.7 °F (36.5 °C)-98 °F (36.7 °C)] 97.7 °F (36.5 °C)  Pulse:  [] 96  Resp:  [14-31] 20  SpO2:  [91 %-97 %] 95 %  BP: (114-192)/(60-88) 122/85  Arterial Line BP: (124-133)/(61-71) 133/64     Weight: 47.6 kg (104 lb 15 oz)  Body mass index is 18.01 kg/m².    Intake/Output Summary (Last 24 hours) at 6/20/2022 0832  Last data filed at 6/20/2022 0600  Gross per 24 hour   Intake 2232.73 ml   Output 3515 ml   Net -1282.27 ml      Physical Exam  Vitals and nursing note reviewed.   Constitutional:       Comments: Critically and chronically ill-appearing   HENT:      Head: Normocephalic and atraumatic.   Neck:      Comments: R IJ  Cardiovascular:      Rate and Rhythm: Regular rhythm. Tachycardia present.   Pulmonary:      Comments:  Currently on NC, increased RR, pursed lip breathing, rhonchi present  Abdominal:      Comments: Mildly distended but soft, hypoactive bowel sounds right abdomen, nontender   Genitourinary:     Comments: Wong catheter in place with yellow urine draining  Musculoskeletal:      Cervical back: Neck supple.      Comments: Evidence of severe muscle wasting, cachexia with low BMI   Skin:     Comments: Bruising upper extremities, healed lesions lower extremity   Neurological:      Mental Status: She is alert and oriented to person, place, and time.      Comments: Alert and oriented, generalized weakness   Psychiatric:      Comments: Anxious appearing       Significant Labs: BMP:   Recent Labs   Lab 06/20/22 0229   GLU 88   *   K 3.3*   CL 87*   CO2 41*   BUN 20   CREATININE 0.3*   CALCIUM 7.8*   MG 1.7     CBC:   Recent Labs   Lab 06/19/22  0510 06/19/22  0514 06/20/22 0229   WBC  --  12.12 12.12   HGB  --  9.2* 9.6*   HCT 31* 29.4* 30.2*   PLT  --  190 220     CMP:   Recent Labs   Lab 06/19/22 0514 06/20/22 0229   * 133*   K 4.0 3.3*   CL 87* 87*   CO2 39* 41*   * 88   BUN 20 20   CREATININE 0.3* 0.3*   CALCIUM 8.2* 7.8*   PROT 5.5* 5.4*   ALBUMIN 2.6* 2.6*   BILITOT 0.6 0.6   ALKPHOS 48* 45*   AST 27 23   ALT 25 22   ANIONGAP 6* 5*   EGFRNONAA >60.0 >60.0     Cardiac Markers: No results for input(s): CKMB, MYOGLOBIN, BNP, TROPISTAT in the last 48 hours.  Lactic Acid: No results for input(s): LACTATE in the last 48 hours.  Magnesium:   Recent Labs   Lab 06/19/22  0514 06/19/22  1334 06/20/22 0229   MG 1.8 2.1 1.7     POCT Glucose: No results for input(s): POCTGLUCOSE in the last 48 hours.    Significant Imaging: I have reviewed and interpreted all pertinent imaging results/findings within the past 24 hours.

## 2022-06-20 NOTE — PROGRESS NOTES
[facility] Medicine  Progress Note    Patient Name: Estelle Cast  MRN: [id number]  Patient Class: IP- Inpatient   Admission Date: 6/17/2022  Length of Stay: 3 days  Attending Physician: Reyna Parada MD  Primary Care Provider: Primary Doctor No        Subjective:     Principal Problem:Chronic respiratory failure        HPI:  56-year-old with past medical history significant for COPD, BMI 14.8, depression, generalized anxiety, DANIS, extensive smoking history presenting with worsening shortness breath.  At baseline she uses 3 L it was reported that her oxygen was in the low 80s.  Minimally productive cough.  Chronic diarrhea unchanged from baseline.  No fevers, chills, nausea, vomiting, sick contacts.  No chest pain.  Patient has history of chronic abdominal pain from bowel resection from severe sepsis episode in the past.  On presentation she had been complaining of some upper abdominal pain that was mild.  Family recently relocated from [place].    At time of my evaluation the patient had been intubated for worsening shortness of breath and acute respiratory decompensation with code.  Per ED physician verbal report, patient had gone into torsades and subsequently V-tach.  CPR, epinephrine, amiodarone, shock, and magnesium were given.  Patient responded and is now intubated.  Approximate time of code was less than 10 minutes.  She is currently on Levophed drip.  She is alert and able to give the thumbs-up sign.  My history is limited to report from emergency department.    ED lab significant for WBC 18, Na 134, CO2 48, creatinine 0.3, BNP 60.  Troponins negative.  Lactic acid negative.  Procalcitonin negative.    Chest x-ray significant for tiny interstitial opacity and right upper lobe and right lower lung base.  Lungs are hyperexpanded consistent with emphysema.    In the ED she received a dose of the azithromycin, ceftriaxone, and later meropenem after she was intubated.  She is  also received a dose of Solu-Medrol and DuoNeb treatments.        Overview/Hospital Course:  Assumed care of this patient on 6/18/22.  Patient recently relocated to [place] to live with sister from [place].  Known history of COPD, chronic respiratory failure on 3 L home oxygen, continued tobacco use.  States in December needed emergent surgery, was taken to [facility], had abdominal resection, since then she is followed by surgery and Gastroenterology, chronic abdominal pain, intermittent worsening, loose stools and as per report seems she was placed on b.i.d. Questran.  Initially presented to the ED due to worsening abdominal pain.  Subsequently with worsening respiratory status, cough productive of greenish phlegm, cardiopulmonary arrest (reported torsades with VT after azithromycin and EKG with QT prolongation), status post ROSC, intubated and transferred to the ICU.  Chest x-ray with concern for right lung pneumonia, likely aspiration.  CT abdomen with concern for large stool burden large bowel and concern for partial colonic obstruction.  Patient is severely malnourished.  On 06/18 remains orally intubated but awake, alert, communicating via writing, copious amounts of phlegm production, denies any abdominal pain, states she is not passing flatus, no bowel movements today.  On 06/19, EKG with continued QTC prolongation, echo now with EF of 25% with grade 1 diastolic dysfunction, started on metoprolol, cardiology following.  Plan is attempted extubation to BiPAP.  PICC line when able to start TPN.      Interval History:  Patient was extubated to BiPAP yesterday.  She is currently on nasal cannula to receive morning medications but does have increased work of breathing, does admit to shortness of breath.  Does report generalized abdominal discomfort, mild nausea but no emesis, states she is only passing small amount of flatus.  Described pain as gas pain.  Afebrile, blood pressure increased last night.   Labs with hemoglobin 9.6, potassium 3.3, BUN/creatinine 20/0.3, magnesium 1.7.  ABG this morning 7.36/80/48/21.  Respiratory sample with no normal respiratory mane.  Chest x-ray with hyperexpanded lungs.  .  Documented urine output 3515 cc.  Sister present at bedside.  Discussed with nursing.     Review of Systems   Constitutional:  Negative for fever.   Respiratory:  Positive for shortness of breath.    Gastrointestinal:  Positive for abdominal pain and nausea.   Neurological:  Positive for weakness.   Psychiatric/Behavioral:  The patient is nervous/anxious.    Objective:     Vital Signs (Most Recent):  Temp: 97.7 °F (36.5 °C) (06/20/22 0315)  Pulse: 96 (06/20/22 0805)  Resp: 20 (06/20/22 0745)  BP: 122/85 (06/20/22 0805)  SpO2: 95 % (06/20/22 0745) Vital Signs (24h Range):  Temp:  [97.7 °F (36.5 °C)-98 °F (36.7 °C)] 97.7 °F (36.5 °C)  Pulse:  [] 96  Resp:  [14-31] 20  SpO2:  [91 %-97 %] 95 %  BP: (114-192)/(60-88) 122/85  Arterial Line BP: (124-133)/(61-71) 133/64     Weight: 47.6 kg (104 lb 15 oz)  Body mass index is 18.01 kg/m².    Intake/Output Summary (Last 24 hours) at 6/20/2022 0832  Last data filed at 6/20/2022 0600  Gross per 24 hour   Intake 2232.73 ml   Output 3515 ml   Net -1282.27 ml      Physical Exam  Vitals and nursing note reviewed.   Constitutional:       Comments: Critically and chronically ill-appearing   HENT:      Head: Normocephalic and atraumatic.   Neck:      Comments: R IJ  Cardiovascular:      Rate and Rhythm: Regular rhythm. Tachycardia present.   Pulmonary:      Comments: Currently on NC, increased RR, pursed lip breathing, rhonchi present  Abdominal:      Comments: Mildly distended but soft, hypoactive bowel sounds right abdomen, nontender   Genitourinary:     Comments: Wong catheter in place with yellow urine draining  Musculoskeletal:      Cervical back: Neck supple.      Comments: Evidence of severe muscle wasting, cachexia with low BMI   Skin:     Comments: Bruising  upper extremities, healed lesions lower extremity   Neurological:      Mental Status: She is alert and oriented to person, place, and time.      Comments: Alert and oriented, generalized weakness   Psychiatric:      Comments: Anxious appearing       Significant Labs: BMP:   Recent Labs   Lab 06/20/22 0229   GLU 88   *   K 3.3*   CL 87*   CO2 41*   BUN 20   CREATININE 0.3*   CALCIUM 7.8*   MG 1.7     CBC:   Recent Labs   Lab 06/19/22  0510 06/19/22  0514 06/20/22 0229   WBC  --  12.12 12.12   HGB  --  9.2* 9.6*   HCT 31* 29.4* 30.2*   PLT  --  190 220     CMP:   Recent Labs   Lab 06/19/22  0514 06/20/22 0229   * 133*   K 4.0 3.3*   CL 87* 87*   CO2 39* 41*   * 88   BUN 20 20   CREATININE 0.3* 0.3*   CALCIUM 8.2* 7.8*   PROT 5.5* 5.4*   ALBUMIN 2.6* 2.6*   BILITOT 0.6 0.6   ALKPHOS 48* 45*   AST 27 23   ALT 25 22   ANIONGAP 6* 5*   EGFRNONAA >60.0 >60.0     Cardiac Markers: No results for input(s): CKMB, MYOGLOBIN, BNP, TROPISTAT in the last 48 hours.  Lactic Acid: No results for input(s): LACTATE in the last 48 hours.  Magnesium:   Recent Labs   Lab 06/19/22  0514 06/19/22  1334 06/20/22 0229   MG 1.8 2.1 1.7     POCT Glucose: No results for input(s): POCTGLUCOSE in the last 48 hours.    Significant Imaging: I have reviewed and interpreted all pertinent imaging results/findings within the past 24 hours.      Assessment/Plan:      * Acute on chronic respiratory failure requiring mechanical ventilation  Intubated in the ED. On 06/19 extubated to BiPAP  P.r.n. ABG and chest x-ray  Pulmonary following    VT (ventricular tachycardia)  QTC prolongation with torsades and ventricular tachycardia.  No further episodes.  Started on metoprolol.  Continuous cardiac monitoring      VT/torsades cardiac arrest with ROSC  Patient had ROSC, see QT and cardiomyopathy        COPD (chronic obstructive pulmonary disease)  Respiratory status remains very tenuous and at high risk for re-intubation  Continue scheduled  Reji, increase IV steroids 40 q.6  Suspect severe COPD    Right lung pneumonia, likely aspiration  Respiratory sample with no pathogens but radiological evidence of right lung pneumonia and suspect aspiration  Continue meropenem, adjust as needed      Obstructive sleep apnea syndrome  Has had multiple admissions for hypercapnic respiratory failure as per report  Reportedly has NIP VV at home  Continue BiPAP      QT prolongation  With torsades and VT in the ED  Avoid multiple QTC prolonging medications, checking EKG daily-QTC this morning 495  Keep potassium greater than 4, magnesium greater than 2      Cardiomyopathy  Echo this admission with EF of 25% with grade 1 diastolic dysfunction  Denies any previous known history of heart disease  On 06/18 started on metoprolol, adjust goal-directed therapy as able-with up trending blood pressure will discussed with Cardiology ACE versus Entresto  Will need left heart catheterization/further evaluation as per Cardiology  Monitor for signs of volume overload  Appreciate cardiology input      Abdominal pain  Acute on chronic  CT is concerning for bowel obstruction with large stool burden, seen by General surgery and given enema on 06/19  KUB 6/20 nonobstructive bowel gas pattern  Trial of clear liquid diet and serial abdominal examination        Hypokalemia  Potassium 3.3, replaced as per sliding scale to keep greater than 4    Hyponatremia  Optimizing nutrition as able and following      Anemia  Chronic, no evidence of active bleeding, monitoring      Severe malnutrition  Chronic issue.  There was concern for bowel obstruction, surgery following, KUB this morning with nonobstructive bowel gas pattern  Central line in place, start TPN, consult for PICC line    BALDEMAR (generalized anxiety disorder)  Continue p.r.n. benzodiazepine and monitoring      Smoker  Nicotine patch in place.  Continue to reinforce smoking cessation    BMI less than 19,adult  See malnutrition      VTE  Risk Mitigation (From admission, onward)         Ordered     enoxaparin injection 40 mg  Daily         06/17/22 2317     Place sequential compression device  Until discontinued         06/17/22 2317     IP VTE HIGH RISK PATIENT  Once         06/17/22 2317     Reason for no Mechanical VTE Prophylaxis  Once        Question:  Reasons:  Answer:  Physician Provided (leave comment)    06/17/22 2317                Discharge Planning   LIZANDRO:      Code Status: Full Code   Is the patient medically ready for discharge?:     Reason for patient still in hospital (select all that apply): Patient unstable  Discharge Plan A: Home with family                  Reyna Parada MD  Department of Hospital Medicine   [facility]

## 2022-06-20 NOTE — CARE UPDATE
06/19/22 1948   Patient Assessment/Suction   Level of Consciousness (AVPU) alert   Respiratory Effort Normal   Expansion/Accessory Muscles/Retractions no use of accessory muscles   All Lung Fields Breath Sounds wheezes, expiratory   Rhythm/Pattern, Respiratory assisted mechanically  (BIPAP)   PRE-TX-O2   O2 Device (Oxygen Therapy) BiPAP   $ Is the patient on Low Flow Oxygen? Yes   Oxygen Concentration (%) 35   SpO2 (!) 93 %   Pulse Oximetry Type Continuous   $ Pulse Oximetry - Multiple Charge Pulse Oximetry - Multiple   Pulse 84   Resp 20   Aerosol Therapy   $ Aerosol Therapy Charges Aerosol Treatment   Daily Review of Necessity (SVN) completed   Respiratory Treatment Status (SVN) given   Treatment Route (SVN) in-line   Patient Position (SVN) HOB elevated   Post Treatment Assessment (SVN) breath sounds unchanged;vital signs unchanged   Signs of Intolerance (SVN) none   Skin Integrity   $ Wound Care Tech Time 15 min   Area Observed Bridge of nose   Skin Appearance without discoloration   Barrier used? Gel Cushion   Ready to Wean/Extubation Screen   FIO2<=50 (chart decimal) 0.35   Preset CPAP/BiPAP Settings   Mode Of Delivery BiPAP   $ Initial CPAP/BiPAP Setup? No   $ Is patient using? Yes   Size of Mask Small   Sized Appropriately? Yes   Equipment Type V60   Ipap 18   EPAP (cm H2O) 6   Pressure Support (cm H2O) 12   Set Rate (Breaths/Min) 20   ITime (sec) 1   Rise Time (sec) 3   Patient CPAP/BiPAP Settings   RR Total (Breaths/Min) 20   Tidal Volume (mL) 448   VE Minute Ventilation (L/min) 9 L/min   Peak Inspiratory Pressure (cm H2O) 19   TiTOT (%) 33   Total Leak (L/Min) 14   Patient Trigger - ST Mode Only (%) 8   CPAP/BiPAP Alarms   High Pressure (cm H2O) 40   Low Pressure (cm H2O) 10   Minute Ventilation (L/Min) 3   High RR (breaths/min) 40   Low RR (breaths/min) 10   Apnea (Sec) 20   Education   $ Education BiPAP;Bronchodilator;15 min   Respiratory Evaluation   $ Care Plan Tech Time 15 min

## 2022-06-20 NOTE — PT/OT/SLP EVAL
"Physical Therapy Evaluation    Patient Name:  Estelle Cast   MRN:  [id number]    Recommendations:     Discharge Recommendations:  nursing facility, skilled ((dep on prog))   Discharge Equipment Recommendations: other (see comments) (TBD)   Barriers to discharge: Inaccessible home and Decreased caregiver support    Assessment:     Estelle Cast is a 56 y.o. female admitted with a medical diagnosis of Chronic respiratory failure.  She presents with the following impairments/functional limitations:  weakness, impaired endurance, impaired self care skills, impaired functional mobilty, gait instability, impaired balance, decreased upper extremity function, decreased lower extremity function, pain, impaired skin, impaired cardiopulmonary response to activity. Pt found HOB elevated and agreeable to working with PT. Pt A & O x  4 and has the following co-morbidities: COPD, Anemia, Severe Malnutrition, VT.  Pt tolerated session well and required MIN A for safe mobilization during session today. Pt would benefit from acute PT during hospitalization to increase strength, endurance and safety with mobility. Pt's living situation is uncertain at this time and would benefit from SNF prior to discharge home.       Rehab Prognosis: Fair; patient would benefit from acute skilled PT services to address these deficits and reach maximum level of function.    Recent Surgery: * No surgery found *      Plan:     During this hospitalization, patient to be seen 6 x/week to address the identified rehab impairments via gait training, therapeutic activities, therapeutic exercises and progress toward the following goals:    · Plan of Care Expires:  07/20/22    Subjective     Chief Complaint: Pain and Fatigue  Patient/Family Comments/goals: Sister stated "Her care just fell through the cracks at the other hospital and I don't want that to happen here"   Pain/Comfort:  · Pain Rating 1: other (see comments) (unrated)  · Location - " "Orientation 1: generalized  · Location 1: other (see comments) ("all over")  · Pain Addressed 1: Reposition, Distraction    Patients cultural, spiritual, Restorationist conflicts given the current situation:      Living Environment:  Pt's sister stated she would be living with her and described the environment as a one story home with aprox. 12 steps to enter. Pt's sister reported that an elevator being discussed as an accomodation at the home  Prior to admission, patients level of function was supervision.  Equipment used at home: oxygen.  DME owned (not currently used): none.  Upon discharge, patient will have assistance from sister.    Objective:     Communicated with MICHAEL Madrigal prior to session.  Patient found HOB elevated with bed alarm, blood pressure cuff, eugene catheter, oxygen, peripheral IV, PICC line, pulse ox (continuous), telemetry  upon PT entry to room.    General Precautions: Standard, fall   Orthopedic Precautions:N/A   Braces: N/A  Respiratory Status: High flow, flow 4 L/min with humidification  Exams:  · Cognitive Exam:  Patient is oriented to Person, Place, Time and Situation  · RLE ROM: WFL  · RLE Strength: WFL  · LLE ROM: WFL  · LLE Strength: WFL    Functional Mobility:  · Bed Mobility:     · Scooting: supervision  · Supine to Sit: minimum assistance and as HOB was elevated beyond 45 degrees  · Sit to Supine: minimum assistance  · Transfers:     · Sit to Stand:  minimum assistance with rolling walker  · Gait: Pt was able to tolerate gt x8' x4 trials RW MIN A to steady when walking backwards  · Balance: Pt is able to support herself sitting EOB, but not in a upright position. Standing, pt is able to maintain static balance and dynamic balance loss noted when ambulating backwards.    Therapeutic Activities and Exercises:   Pt was educated on the following: call light use, importance of OOB activity and functional mobility to negate the negative effects of prolonged bed rest during this " hospitalization, safe transfers/ambulation and discharge planning recommendations/options.    AM-PAC 6 CLICK MOBILITY  Total Score:17     Patient left HOB elevated with all lines intact, call button in reach, bed alarm on, RN notified and sister present.    GOALS:   Multidisciplinary Problems     Physical Therapy Goals        Problem: Physical Therapy    Goal Priority Disciplines Outcome Goal Variances Interventions   Physical Therapy Goal     PT, PT/OT      Description: Goals to be met by: 2022     Patient will increase functional independence with mobility by performin. Supine to sit with Supervision  2. Sit to stand transfer with Supervision  3. Bed to chair transfer with Supervision using Rolling Walker  4. Gait  x 75 feet with Supervision using Rolling Walker.                          History:     Past Medical History:   Diagnosis Date    Anemia, unspecified     Asthma     Chronic respiratory failure with hypoxia     COPD (chronic obstructive pulmonary disease)     Dysuria     Hypokalemia     Hypomagnesemia     Hyponatremia     Malnutrition     Sepsis, unspecified organism        Past Surgical History:   Procedure Laterality Date    ABDOMINAL SURGERY      CHOLECYSTECTOMY      COLECTOMY      2021    HYSTERECTOMY         Time Tracking:     PT Received On: 22  PT Start Time: 1352     PT Stop Time: 1426  PT Total Time (min): 34 min     Billable Minutes: Evaluation 10 and Therapeutic Activity 24      2022

## 2022-06-20 NOTE — CONSULTS
Critical access hospital  Adult Nutrition   Progress Note (Follow-Up)    SUMMARY     Recommendations/Interventions:  1. New TPN to start @ 1700. TPN to run @ 75 mL/hr over 24 hours.  2. Hyponatremia (133), Hypokalemia (3.3), elevated CO2 (41)-continue to monitor and manage per MD.   3. Continue to advance diet as medically able.    4. RD to monitor diet progression, labs, plan of care, and make recommendations accordingly.  Goals: 1. Diet to advance within 24-48 hours. 2. Electrolytes monitored and managed.  Nutrition Goal Status: new    Dietitian Rounds Brief:  · Follow-up. Patient extubated yesterday and started on CLD. Patient on Clinimix-E x 2 days. Consult for TPN-starting TPN today. Patient consumed 100% of CLD breakfast this morning, however experiencing bloating/abdominal distention and gas. KUB this morning with nonobstructive bowel gas pattern. Now only getting ice chips and water. Will monitor TPN tolerance and plan of care.   Malnutrition Assessment:  · Agree with Severe Malnutrition-severe muscle wasting     PARENTERAL NUTRITION PROGRESS NOTE:      Parenteral Nutrition Day # 1  Diagnosis/Indication for PN: SBO  IV Access: PICC  Diet/Tube Feeding: Clear Liquid Diet         Admixture Type: Custome 3-in-1 TPN  Infusion Rate and Frequency: 75 mL/hr   Patient Acuity:Acute    PN Composition:   72 grams Amino Acid, 70 grams Dextrose, and 50 grams Lipid.    Today's TPN provides 1026 kcal.  *Dextrose is started at less than full dextrose goal to reduce risk for Refeeding Syndrome. Dextrose content will be advanced as appropriate over the next few days.    Please see order for electrolytes and additives content and adjustments.   *The Nutrition Support Team will continue to monitor electrolytes daily and adjust parenteral nutrition as warranted.    Reason for Assessment  Reason For Assessment: RD follow-up  Diagnosis:  (Acute on chronic respiratory failure requiring mechanical ventilation)  Relevant Medical  "History: COPD, depression, generalized anxiety, DANIS, extensive smoking, severe malnutrition, anemia  Interdisciplinary Rounds: attended    Nutrition Risk Screen  Nutrition Risk Screen: no indicators present    Nutrition/Diet History  Food Allergies:  (shellfish containing products)  Factors Affecting Nutritional Intake: clear liquid diet, altered gastrointestinal function    Anthropometrics  Temp: 97.7 °F (36.5 °C)  Height Method: Stated  Height: 5' 4" (162.6 cm)  Height (inches): 64 in  Weight Method: Bed Scale  Weight: 47.6 kg (104 lb 15 oz)  Weight (lb): 104.94 lb  Ideal Body Weight (IBW), Female: 120 lb  % Ideal Body Weight, Female (lb): 71.67 %  BMI (Calculated): 18  BMI Grade: 17 - 18.4 protein-energy malnutrition grade I     Weight History:  Wt Readings from Last 10 Encounters:   06/17/22 47.6 kg (104 lb 15 oz)   06/18/22 47.2 kg (104 lb)     Lab/Procedures/Meds: Pertinent Labs Reviewed  Clinical Chemistry:  Recent Labs   Lab 06/18/22 0304 06/19/22  0514 06/20/22 0229   * 132* 133*   K 3.7 4.0 3.3*   CL 85* 87* 87*   CO2 40* 39* 41*    115* 88   BUN 15 20 20   CREATININE 0.5 0.3* 0.3*   CALCIUM 7.8* 8.2* 7.8*   PROT  --  5.5* 5.4*   ALBUMIN  --  2.6* 2.6*   BILITOT  --  0.6 0.6   ALKPHOS  --  48* 45*   AST  --  27 23   ALT  --  25 22   ANIONGAP 7* 6* 5*   ESTGFRAFRICA >60.0 >60.0 >60.0   EGFRNONAA >60.0 >60.0 >60.0   MG 2.2 1.8 1.7   PHOS 2.6* 3.4 4.7*    < > = values in this interval not displayed.     CBC:   Recent Labs   Lab 06/20/22 0229   WBC 12.12   RBC 3.40*   HGB 9.6*   HCT 30.2*      MCV 89   MCH 28.2   MCHC 31.8*     Cardiac Profile:  Recent Labs   Lab 06/17/22  1307   BNP 60   TROPONINI <0.030     Medications: Pertinent Medications reviewed  Scheduled Meds:   albuterol-ipratropium  3 mL Nebulization Q4H    enoxaparin  40 mg Subcutaneous Daily    guaiFENesin  1,200 mg Oral Daily    latanoprost  1 drop Both Eyes QHS    meropenem (MERREM) IVPB  1 g Intravenous Q8H    " methylPREDNISolone sodium succinate injection  40 mg Intravenous Daily    metoprolol tartrate  25 mg Oral BID    nicotine  1 patch Transdermal Daily     Continuous Infusions:   amino acids 4.25%-[id number] 75 mL/hr at 06/19/22 1833     PRN Meds:.acetaminophen, albuterol-ipratropium, ALPRAZolam, calcium gluconate IVPB, calcium gluconate IVPB, calcium gluconate IVPB, dextrose 50%, dextrose 50%, glucagon (human recombinant), glucose, glucose, labetalol, magnesium oxide, magnesium oxide, magnesium sulfate IVPB, magnesium sulfate IVPB, morphine, naloxone, polyethylene glycol, potassium bicarbonate, potassium bicarbonate, potassium bicarbonate, potassium chloride in water **AND** potassium chloride in water **AND** potassium chloride in water, prochlorperazine, sodium chloride 0.9%, sodium chloride 0.9%, sodium phosphate IVPB, sodium phosphate IVPB, sodium phosphate IVPB    Antibiotics (From admission, onward)            Start     Stop Route Frequency Ordered    06/18/22 0500  meropenem-0.9% sodium chloride 1 g/50 mL IVPB         -- IV Every 8 hours (non-standard times) 06/17/22 2317        Estimated/Assessed Needs  Weight Used For Calorie Calculations: 47.6 kg (104 lb 15 oz)  Energy Calorie Requirements (kcal): 7490-7821 (35-40kcal/kg once extubated)  Energy Need Method: Kcal/kg  Protein Requirements: 57-72gm (1.2-1.5gm/kg)  Weight Used For Protein Calculations: 47.6 kg (104 lb 15 oz)  Fluid Requirements (mL): 1 mL/kcal or per MD     RDA Method (mL): 1666    Nutrition Prescription Ordered    Current Diet Order: Clear Liquid Diet  Current Nutrition Support Formula Ordered: Clinimix 4.25/5 (Clinimix-E)  Current Nutrition Support Rate Ordered: 75 (ml)  Current Nutrition Support Frequency Ordered: mL/hr' continuous    Evaluation of Received Nutrient/Fluid Intake    Parenteral Calories (kcal): 612  Parenteral Protein (gm): 76.5  Parenteral Fluid (mL): 1800  Energy Calories Required: not meeting needs  Protein Required:  meeting needs  Fluid Required: meeting needs  Tolerance: tolerating  % Intake of Estimated Energy Needs: 25 - 50 %  % Meal Intake: 75 - 100 %    Intake/Output Summary (Last 24 hours) at 6/20/2022 0812  Last data filed at 6/20/2022 0600  Gross per 24 hour   Intake 2232.73 ml   Output 3515 ml   Net -1282.27 ml      Nutrition Risk    Level of Risk/Frequency of Follow-up: high   Monitor and Evaluation    Food and Nutrient Intake: energy intake, food and beverage intake  Food and Nutrient Adminstration: diet order  Physical Activity and Function: nutrition-related ADLs and IADLs, factors affecting access to physical activity  Anthropometric Measurements: weight, weight change, body mass index  Biochemical Data, Medical Tests and Procedures: electrolyte and renal panel, glucose/endocrine profile, lipid profile, inflammatory profile, gastrointestinal profile  Nutrition-Focused Physical Findings: overall appearance     Nutrition Follow-Up    RD Follow-up?: Yes  Dee Wakefield RD 06/20/2022 10:30 AM

## 2022-06-20 NOTE — CARE UPDATE
Admitted thru ED with c/o SOB. Has home oxygen and uses 3L per progress note review. Patient intubated in ER. Extubated 6/19. Currently on 4LNC. IV antibiotics, IV steroids. Not medically ready for dc.   CM will continue to follow and offer dcp assistance.

## 2022-06-20 NOTE — PROGRESS NOTES
[facility]  Department of Cardiology  Progress Note      PATIENT NAME: Estelle Cast    MRN: [id number]  TODAY'S DATE: 06/20/2022  ADMIT DATE: 6/17/2022                          CONSULT REQUESTED BY: Reyna Parada MD    SUBJECTIVE     PRINCIPAL PROBLEM: Chronic respiratory failure      6/20/2022    Patient is off the ventilator. She denies chest pain. She has a BMI 17.85. K is 3.3. and Magnesium is 1.7.      6/19/22:  Patient seen resting in bed on CPAP/BiPAP.  Her sister is at bedside and is currently going through stacks of records.  According to the patient she has not had a cardiac angiogram in the past but has had a workup.  Currently she is in sinus rhythm with occasional PVCs which have improved.    REASON FOR CONSULT:    From H&P: 56-year-old with past medical history significant for COPD, BMI 14.8, depression, generalized anxiety, DANIS, extensive smoking history presenting with worsening shortness breath.  At baseline she uses 3 L it was reported that her oxygen was in the low 80s.  Minimally productive cough.  Chronic diarrhea unchanged from baseline.  No fevers, chills, nausea, vomiting, sick contacts.  No chest pain.  Patient has history of chronic abdominal pain from bowel resection from severe sepsis episode in the past.  On presentation she had been complaining of some upper abdominal pain that was mild.  Family recently relocated from [place].     At time of my evaluation the patient had been intubated for worsening shortness of breath and acute respiratory decompensation with code.  Per ED physician verbal report, patient had gone into torsades and subsequently V-tach.  CPR, epinephrine, amiodarone, shock, and magnesium were given.  Patient responded and is now intubated.  Approximate time of code was less than 10 minutes.  She is currently on Levophed drip.  She is alert and able to give the thumbs-up sign.  My history is limited to report from emergency department.     ED  lab significant for WBC 18, Na 134, CO2 48, creatinine 0.3, BNP 60.  Troponins negative.  Lactic acid negative.  Procalcitonin negative.     Chest x-ray significant for tiny interstitial opacity and right upper lobe and right lower lung base.  Lungs are hyperexpanded consistent with emphysema.     In the ED she received a dose of the azithromycin, ceftriaxone, and later meropenem after she was intubated.  She is also received a dose of Solu-Medrol and DuoNeb treatments.           No new subjective & objective note has been filed under this hospital service since the last note was generated.      HPI:    Patient is a 56-year-old female who presented to the emergency room with worsening shortness of breath.  Patient was noted to be hypoxic with O2 sat in the 80s.  In the emergency room the patient went into torsades and subsequently ventricular tachycardia according to H&P.  Patient was coded with ACLS protocol and achieved Rosc.  She was intubated and put in the ICU at which time she was placed on a Levophed drip.    Patient's niece was present at bedside this morning, but she was not aware of the patient's medical history other than that she had been having some abdominal pain at her home in [place] and decided to come over and stay with her sister.      Review of patient's allergies indicates:   Allergen Reactions    Celexa [citalopram]     Breztri aerosphere [budesonide-glycopyr-formoterol]     Pcn [penicillins]     Shellfish containing products     Advair diskus [fluticasone propion-salmeterol] Rash    Azithromycin Rash    Buspirone (bulk) Rash    Chantix [varenicline] Nausea And Vomiting       Past Medical History:   Diagnosis Date    Anemia, unspecified     Asthma     Chronic respiratory failure with hypoxia     COPD (chronic obstructive pulmonary disease)     Dysuria     Hypokalemia     Hypomagnesemia     Hyponatremia     Malnutrition     Sepsis, unspecified organism      Past Surgical  History:   Procedure Laterality Date    ABDOMINAL SURGERY      CHOLECYSTECTOMY      COLECTOMY      12/2021    HYSTERECTOMY       Social History     Tobacco Use    Smoking status: Current Every Day Smoker     Packs/day: 0.50    Smokeless tobacco: Never Used   Substance Use Topics    Alcohol use: Not Currently    Drug use: Yes     Types: Marijuana     Comment: x 5 days ago        REVIEW OF SYSTEMS      OBJECTIVE     VITAL SIGNS (Most Recent)  Temp: 97.7 °F (36.5 °C) (06/20/22 0315)  Pulse: 90 (06/20/22 1111)  Resp: 20 (06/20/22 1111)  BP: 122/85 (06/20/22 0805)  SpO2: 100 % (06/20/22 1111)    VENTILATION STATUS  Resp: 20 (06/20/22 1111)  SpO2: 100 % (06/20/22 1111)  Oxygen Concentration (%):  [35-40] 36    I & O (Last 24H):    Intake/Output Summary (Last 24 hours) at 6/20/2022 1245  Last data filed at 6/20/2022 0600  Gross per 24 hour   Intake 2232.73 ml   Output 1840 ml   Net 392.73 ml       WEIGHTS  Wt Readings from Last 1 Encounters:   06/17/22 2245 47.6 kg (104 lb 15 oz)   06/17/22 1100 39 kg (86 lb)       PHYSICAL EXAM  CONSTITUTIONAL: No fever, no chills; very thin female in no acute distress lying in bed  HEENT: Normocephalic, atraumatic,pupils reactive to light                 NECK:  No JVD no carotid bruit  CVS: S1S2+, RRR  LUNGS: Diminished  ABDOMEN: Soft, NT, BS+  EXTREMITIES: No cyanosis, edema  : + eugene catheter  NEURO: AAO X 3      HOME MEDICATIONS:  No current facility-administered medications on file prior to encounter.     Current Outpatient Medications on File Prior to Encounter   Medication Sig Dispense Refill    acetaminophen (TYLENOL) 500 MG tablet Take 500 mg by mouth every 6 (six) hours as needed for Pain.      albuterol (ACCUNEB) 0.63 mg/3 mL Nebu Take 0.63 mg by nebulization every 6 (six) hours as needed. Rescue      ALPRAZolam (XANAX) 0.25 MG tablet Take by mouth 3 (three) times daily.      azelastine (ASTELIN) 137 mcg (0.1 %) nasal spray 1 spray by Nasal route 2 (two) times  daily.      azithromycin (Z-CAMI) 250 MG tablet Take 500 mg by mouth once daily.      fluticasone furoate (ARNUITY ELLIPTA) 200 mcg/actuation inhaler Inhale 200 mcg into the lungs. Controller      guaiFENesin (MUCINEX) 600 mg 12 hr tablet Take 1,200 mg by mouth once daily.      ipratropium (ATROVENT HFA) 17 mcg/actuation inhaler Inhale 2 puffs into the lungs every 6 (six) hours. Rescue      ipratropium/albuterol sulfate (DUONEB INHL) Inhale into the lungs.      latanoprost, PF, 0.005 % Drop Apply to eye.      neomycin-polymyxin-hydrocortisone (CORTISPORIN) 3.5-10,000-10 mg-unit-mg/mL ophthalmic suspension 1 drop every 4 (four) hours.      nicotine (NICODERM CQ) 14 mg/24 hr Place 1 patch onto the skin every 24 hours.      pantoprazole (PROTONIX) 40 MG tablet Take 40 mg by mouth once daily.         SCHEDULED MEDS:   albuterol-ipratropium  3 mL Nebulization Q4H    chlorhexidine  15 mL Mouth/Throat BID    enoxaparin  40 mg Subcutaneous Daily    guaiFENesin  1,200 mg Oral Daily    latanoprost  1 drop Both Eyes QHS    meropenem (MERREM) IVPB  1 g Intravenous Q8H    methylPREDNISolone sodium succinate injection  40 mg Intravenous Q12H    metoprolol tartrate  25 mg Oral BID    mupirocin   Nasal BID    nicotine  1 patch Transdermal Daily       CONTINUOUS INFUSIONS:   TPN ADULT CENTRAL LINE CUSTOM (3 in 1)         PRN MEDS:acetaminophen, albuterol-ipratropium, ALPRAZolam, calcium gluconate IVPB, calcium gluconate IVPB, calcium gluconate IVPB, dextrose 50%, dextrose 50%, glucagon (human recombinant), glucose, glucose, labetalol, magnesium oxide, magnesium oxide, magnesium sulfate IVPB, magnesium sulfate IVPB, naloxone, oxyCODONE-acetaminophen, polyethylene glycol, potassium bicarbonate, potassium bicarbonate, potassium bicarbonate, potassium chloride in water **AND** potassium chloride in water **AND** potassium chloride in water, prochlorperazine, simethicone, sodium chloride 0.9%, sodium chloride 0.9%,  sodium phosphate IVPB, sodium phosphate IVPB, sodium phosphate IVPB    LABS AND DIAGNOSTICS     CBC LAST 3 DAYS  Recent Labs   Lab 06/18/22  0304 06/18/22  0411 06/19/22  0510 06/19/22  0514 06/20/22  0229   WBC 22.69*  --   --  12.12 12.12   RBC 3.38*  --   --  3.28* 3.40*   HGB 9.5*  --   --  9.2* 9.6*   HCT 30.8*   < > 31* 29.4* 30.2*   MCV 91  --   --  90 89   MCH 28.1  --   --  28.0 28.2   MCHC 30.8*  --   --  31.3* 31.8*   RDW 15.5*  --   --  15.9* 15.7*     --   --  190 220   MPV 8.6*  --   --  8.5* 8.6*   GRAN 90.6*  20.5*  --   --  93.1*  11.3* 80.3*  9.7*   LYMPH 5.6*  1.3  --   --  4.3*  0.5* 13.8*  1.7   MONO 3.1*  0.7  --   --  2.0*  0.2* 5.4  0.7   BASO 0.01  --   --  0.00 0.01   NRBC 0  --   --  0 0    < > = values in this interval not displayed.       COAGULATION LAST 3 DAYS  Recent Labs   Lab 06/19/22  0514   LABPT 12.7   INR 1.0       CHEMISTRY LAST 3 DAYS  Recent Labs   Lab 06/17/22  1307 06/17/22  1507 06/18/22  0304 06/18/22  0411 06/18/22  0524 06/19/22  0510 06/19/22  0514 06/19/22  1334 06/20/22  0229 06/20/22  0352   *  --  132*  --   --   --  132*  --  133*  --    K 4.3  --  3.7  --   --   --  4.0  --  3.3*  --    CL 80*  --  85*  --   --   --  87*  --  87*  --    CO2 48*  --  40*  --   --   --  39*  --  41*  --    ANIONGAP 6*  --  7*  --   --   --  6*  --  5*  --    BUN 10  --  15  --   --   --  20  --  20  --    CREATININE 0.3*  --  0.5  --   --   --  0.3*  --  0.3*  --    GLU 82  --  101  --   --   --  115*  --  88  --    CALCIUM 8.5*  --  7.8*  --   --   --  8.2*  --  7.8*  --    PH  --    < >  --    < > 7.530* 7.398  --   --   --  7.368   MG 1.6  --  2.2  --   --   --  1.8 2.1 1.7  --    ALBUMIN 3.6  --   --   --   --   --  2.6*  --  2.6*  --    PROT 6.9  --   --   --   --   --  5.5*  --  5.4*  --    ALKPHOS 52*  --   --   --   --   --  48*  --  45*  --    ALT 10  --   --   --   --   --  25  --  22  --    AST 15  --   --   --   --   --  27  --  23  --    BILITOT  1.0  --   --   --   --   --  0.6  --  0.6  --     < > = values in this interval not displayed.       CARDIAC PROFILE LAST 3 DAYS  Recent Labs   Lab 06/17/22  1307   BNP 60   TROPONINI <0.030       ENDOCRINE LAST 3 DAYS  Recent Labs   Lab 06/17/22  1307 06/20/22  0229   TSH  --  1.940   PROCAL 0.05  --        LAST ARTERIAL BLOOD GAS  ABG  Recent Labs   Lab 06/20/22  0352   PH 7.368   PO2 48   PCO2 80.6*   HCO3 46.4*   BE 21       LAST 7 DAYS MICROBIOLOGY   Microbiology Results (last 7 days)     Procedure Component Value Units Date/Time    Culture, Respiratory with Gram Stain [[id number]] Collected: 06/18/22 1535    Order Status: Completed Specimen: Respiratory from Tracheal Aspirate Updated: 06/20/22 0655     Respiratory Culture No normal respiratory mane      No growth     Gram Stain (Respiratory) Many WBC's     Gram Stain (Respiratory) <10 epithelial cells per low power field.     Gram Stain (Respiratory) No organisms seen    Blood culture #1 **CANNOT BE ORDERED STAT** [[id number]] Collected: 06/17/22 1307    Order Status: Completed Specimen: Blood from Peripheral, Antecubital, Left Updated: 06/19/22 1432     Blood Culture, Routine No Growth to date      No Growth to date      No Growth to date    Blood culture #2 **CANNOT BE ORDERED STAT** [[id number]] Collected: 06/17/22 1320    Order Status: Completed Specimen: Blood from Peripheral, Antecubital, Right Updated: 06/19/22 1432     Blood Culture, Routine No Growth to date      No Growth to date      No Growth to date    MRSA Screen by PCR [[id number]] Collected: 06/18/22 0121    Order Status: Completed Specimen: Nasopharyngeal Swab from Nasal Updated: 06/18/22 0358     MRSA SCREEN BY PCR Negative          MOST RECENT IMAGING  X-Ray Chest AP Portable  Portable chest x-ray at 11:47 AM is compared to prior study 6/20/2022    Clinical history PICC line placement    There is a left PICC line with the tip overlying the distal superior vena cava. There is no pneumothorax.  The right IJ catheter is in stable position. Cardiomediastinal silhouette is normal in size.    The lungs are hyperexpanded with diffuse emphysematous changes.    There is faint groundglass opacity within the right lower lobe the remainder of the lungs are clear.    IMPRESSION: No pneumothorax status post left PICC line placement    Hyperexpanded lungs compatible with COPD with faint groundglass opacity in the right lower lobe suggestive of pneumonia    Electronically signed by:  Laurel Arana MD  6/20/2022 12:12 PM CDT Workstation: [id number]  X-Ray KUB  KUB    Clinical history is abdominal distention    There is a normal bowel gas pattern.    There are surgical anastomotic sutures in the right midabdomen. No abnormal soft tissue mass or organomegaly. The visualized portion of the lung bases are clear. There are no acute osseous abnormalities.    IMPRESSION: Unremarkable bowel gas pattern    Electronically signed by:  Laurel Arana MD  6/20/2022 8:01 AM CDT Workstation: [id number]  X-Ray Chest AP Portable  Portable chest x-ray 5:17 AM is compared to prior study dated 6/19/2022    Clinical history is shortness of breath    Endotracheal tube has been removed. The right IJ catheter is in stable position.    Lungs are hyperexpanded compatible with COPD. There is near complete resolution of the right upper lobe reticulonodular infiltrate. There are no confluent infiltrates or pleural effusions. There are no acute osseous abnormalities.    IMPRESSION: Hyperexpanded lungs compatible with COPD with near complete resolution of the reticular nodular infiltrate right upper lobe    Electronically signed by:  Laurel Arana MD  6/20/2022 6:11 AM CDT Workstation: [id number]      ECHOCARDIOGRAM RESULTS (last 5)  No results found for this or any previous visit.    Summary    · The left ventricle is normal in size with moderate concentric hypertrophy and severely decreased systolic function.  · Grade I left  ventricular diastolic dysfunction.  · Moderate right ventricular enlargement with mildly reduced right ventricular systolic function.  · The estimated ejection fraction is 25%.  · Mechanically ventilated; cannot use inferior caval vein diameter to estimate central venous pressure.  · Mild tricuspid regurgitation.  · Trivial pericardial effusion.          CURRENT/PREVIOUS VISIT EKG  Results for orders placed or performed during the hospital encounter of 06/17/22   EKG 12-lead    Collection Time: 06/20/22  5:35 AM    Narrative    Test Reason : I47.2,    Vent. Rate : 085 BPM     Atrial Rate : 085 BPM     P-R Int : 130 ms          QRS Dur : 084 ms      QT Int : 416 ms       P-R-T Axes : 068 027 237 degrees     QTc Int : 495 ms    Sinus rhythm with occasional Premature ventricular complexes  Low voltage QRS  ST and Marked T-wave abnormality, consider inferolateral ischemia  Prolonged QT  Abnormal ECG  When compared with ECG of 19-JUN-2022 07:01,  No significant change was found    Referred By: [email or web address]   SELF           Confirmed By:            ASSESSMENT/PLAN:     Active Hospital Problems    Diagnosis    *Acute on chronic respiratory failure requiring mechanical ventilation    Hypokalemia    Acute hypercapnic respiratory failure    Cardiomyopathy    QT prolongation    VT (ventricular tachycardia)    BALDEMAR (generalized anxiety disorder)    Severe malnutrition    COPD (chronic obstructive pulmonary disease)    Anemia    Hyponatremia    VT/torsades cardiac arrest with ROSC     Patient coded in emergency department  Intubated  Admit to ICU  Consult to Critical Care      Right lung pneumonia, likely aspiration    Smoker    Abdominal pain    BMI less than 19,adult    Obstructive sleep apnea syndrome       ASSESSMENT & PLAN:     1. Cardiac arrest  2. Torsades/VT  3. Acute hypoxic respiratory failure  4. Malnutrition  5. Right lung pneumonia  6. Smoker  7. COPD  8. DANIS  9. Hypomagnesemia   10. LV Dysfunction LVEF  25%  11. Hypokalemia  12. Partial colonic obstruction      RECOMMENDATIONS:    Replace Magnesium and Potassium  Keep Mag at least 2.2 and Potassium at least 4.0  Continue Metoprolol for now  She will need coronary angiogram at some point however timing is crucial she is also allergic to iodine and will need prophylactic steroids.  Will also need life vest at discharge.   Will follow you        Lizzie Hernandez NP  [facility]  Department of Cardiology  Date of Service: 06/20/2022  2:44 PM

## 2022-06-20 NOTE — ASSESSMENT & PLAN NOTE
Echo this admission with EF of 25% with grade 1 diastolic dysfunction  Denies any previous known history of heart disease  On 06/18 started on metoprolol, adjust goal-directed therapy as able-with up trending blood pressure will discussed with Cardiology ACE versus Entresto  Will need left heart catheterization/further evaluation as per Cardiology  Monitor for signs of volume overload  Appreciate cardiology input

## 2022-06-20 NOTE — ASSESSMENT & PLAN NOTE
With torsades and VT in the ED  Avoid multiple QTC prolonging medications, checking EKG daily-QTC this morning 495  Keep potassium greater than 4, magnesium greater than 2

## 2022-06-20 NOTE — ASSESSMENT & PLAN NOTE
Respiratory status remains very tenuous and at high risk for re-intubation  Continue scheduled DuoNebs, increase IV steroids 40 q.6  Suspect severe COPD

## 2022-06-20 NOTE — ASSESSMENT & PLAN NOTE
Has had multiple admissions for hypercapnic respiratory failure as per report  Reportedly has NIP VV at home  Continue BiPAP

## 2022-06-21 LAB
ALBUMIN SERPL BCP-MCNC: 2.7 G/DL (ref 3.5–5.2)
ALP SERPL-CCNC: 42 U/L (ref 55–135)
ALT SERPL W/O P-5'-P-CCNC: 18 U/L (ref 10–44)
ANION GAP SERPL CALC-SCNC: 4 MMOL/L (ref 8–16)
AST SERPL-CCNC: 17 U/L (ref 10–40)
BASOPHILS # BLD AUTO: 0 K/UL (ref 0–0.2)
BASOPHILS NFR BLD: 0 % (ref 0–1.9)
BILIRUB SERPL-MCNC: 0.6 MG/DL (ref 0.1–1)
BNP SERPL-MCNC: 277 PG/ML (ref 0–99)
BUN SERPL-MCNC: 13 MG/DL (ref 6–20)
CALCIUM SERPL-MCNC: 7.9 MG/DL (ref 8.7–10.5)
CHLORIDE SERPL-SCNC: 89 MMOL/L (ref 95–110)
CO2 SERPL-SCNC: 41 MMOL/L (ref 23–29)
CREAT SERPL-MCNC: <0.3 MG/DL (ref 0.5–1.4)
DIFFERENTIAL METHOD: ABNORMAL
EOSINOPHIL # BLD AUTO: 0 K/UL (ref 0–0.5)
EOSINOPHIL NFR BLD: 0.1 % (ref 0–8)
ERYTHROCYTE [DISTWIDTH] IN BLOOD BY AUTOMATED COUNT: 14.8 % (ref 11.5–14.5)
EST. GFR  (AFRICAN AMERICAN): >60 ML/MIN/1.73 M^2
EST. GFR  (NON AFRICAN AMERICAN): >60 ML/MIN/1.73 M^2
GLUCOSE SERPL-MCNC: 117 MG/DL (ref 70–110)
HCT VFR BLD AUTO: 31 % (ref 37–48.5)
HGB BLD-MCNC: 9.4 G/DL (ref 12–16)
IMM GRANULOCYTES # BLD AUTO: 0.03 K/UL (ref 0–0.04)
IMM GRANULOCYTES NFR BLD AUTO: 0.4 % (ref 0–0.5)
LYMPHOCYTES # BLD AUTO: 0.4 K/UL (ref 1–4.8)
LYMPHOCYTES NFR BLD: 5 % (ref 18–48)
MAGNESIUM SERPL-MCNC: 1.7 MG/DL (ref 1.6–2.6)
MCH RBC QN AUTO: 27.8 PG (ref 27–31)
MCHC RBC AUTO-ENTMCNC: 30.3 G/DL (ref 32–36)
MCV RBC AUTO: 92 FL (ref 82–98)
MONOCYTES # BLD AUTO: 0.2 K/UL (ref 0.3–1)
MONOCYTES NFR BLD: 2.2 % (ref 4–15)
NEUTROPHILS # BLD AUTO: 6.4 K/UL (ref 1.8–7.7)
NEUTROPHILS NFR BLD: 92.3 % (ref 38–73)
NRBC BLD-RTO: 0 /100 WBC
PHOSPHATE SERPL-MCNC: 2.6 MG/DL (ref 2.7–4.5)
PLATELET # BLD AUTO: 199 K/UL (ref 150–450)
PMV BLD AUTO: 8.7 FL (ref 9.2–12.9)
POTASSIUM SERPL-SCNC: 4 MMOL/L (ref 3.5–5.1)
PROT SERPL-MCNC: 5.5 G/DL (ref 6–8.4)
RBC # BLD AUTO: 3.38 M/UL (ref 4–5.4)
SODIUM SERPL-SCNC: 134 MMOL/L (ref 136–145)
TRIGL SERPL-MCNC: 105 MG/DL (ref 30–150)
TROPONIN I SERPL DL<=0.01 NG/ML-MCNC: <0.03 NG/ML
WBC # BLD AUTO: 6.97 K/UL (ref 3.9–12.7)

## 2022-06-21 PROCEDURE — 25000003 PHARM REV CODE 250: Performed by: INTERNAL MEDICINE

## 2022-06-21 PROCEDURE — 83735 ASSAY OF MAGNESIUM: CPT | Performed by: INTERNAL MEDICINE

## 2022-06-21 PROCEDURE — 99233 SBSQ HOSP IP/OBS HIGH 50: CPT | Mod: ,,, | Performed by: INTERNAL MEDICINE

## 2022-06-21 PROCEDURE — 63600175 PHARM REV CODE 636 W HCPCS: Performed by: INTERNAL MEDICINE

## 2022-06-21 PROCEDURE — 85025 COMPLETE CBC W/AUTO DIFF WBC: CPT | Performed by: INTERNAL MEDICINE

## 2022-06-21 PROCEDURE — 93010 EKG 12-LEAD: ICD-10-PCS | Mod: ,,, | Performed by: SPECIALIST

## 2022-06-21 PROCEDURE — 25000242 PHARM REV CODE 250 ALT 637 W/ HCPCS: Performed by: FAMILY MEDICINE

## 2022-06-21 PROCEDURE — 25000003 PHARM REV CODE 250: Performed by: FAMILY MEDICINE

## 2022-06-21 PROCEDURE — 93010 ELECTROCARDIOGRAM REPORT: CPT | Mod: ,,, | Performed by: SPECIALIST

## 2022-06-21 PROCEDURE — 94640 AIRWAY INHALATION TREATMENT: CPT

## 2022-06-21 PROCEDURE — 84484 ASSAY OF TROPONIN QUANT: CPT | Performed by: INTERNAL MEDICINE

## 2022-06-21 PROCEDURE — 99233 PR SUBSEQUENT HOSPITAL CARE,LEVL III: ICD-10-PCS | Mod: ,,, | Performed by: NURSE PRACTITIONER

## 2022-06-21 PROCEDURE — 25000003 PHARM REV CODE 250: Performed by: NURSE PRACTITIONER

## 2022-06-21 PROCEDURE — 25000003 PHARM REV CODE 250: Performed by: GENERAL PRACTICE

## 2022-06-21 PROCEDURE — 21000000 HC CCU ICU ROOM CHARGE

## 2022-06-21 PROCEDURE — 63600175 PHARM REV CODE 636 W HCPCS: Performed by: FAMILY MEDICINE

## 2022-06-21 PROCEDURE — 99233 SBSQ HOSP IP/OBS HIGH 50: CPT | Mod: ,,, | Performed by: NURSE PRACTITIONER

## 2022-06-21 PROCEDURE — B4185 PARENTERAL SOL 10 GM LIPIDS: HCPCS | Performed by: INTERNAL MEDICINE

## 2022-06-21 PROCEDURE — 93005 ELECTROCARDIOGRAM TRACING: CPT | Performed by: SPECIALIST

## 2022-06-21 PROCEDURE — 99233 PR SUBSEQUENT HOSPITAL CARE,LEVL III: ICD-10-PCS | Mod: ,,, | Performed by: INTERNAL MEDICINE

## 2022-06-21 PROCEDURE — 84478 ASSAY OF TRIGLYCERIDES: CPT | Performed by: INTERNAL MEDICINE

## 2022-06-21 PROCEDURE — 97530 THERAPEUTIC ACTIVITIES: CPT

## 2022-06-21 PROCEDURE — 99900035 HC TECH TIME PER 15 MIN (STAT)

## 2022-06-21 PROCEDURE — 83880 ASSAY OF NATRIURETIC PEPTIDE: CPT | Performed by: INTERNAL MEDICINE

## 2022-06-21 PROCEDURE — 94799 UNLISTED PULMONARY SVC/PX: CPT

## 2022-06-21 PROCEDURE — A4217 STERILE WATER/SALINE, 500 ML: HCPCS | Performed by: INTERNAL MEDICINE

## 2022-06-21 PROCEDURE — 84100 ASSAY OF PHOSPHORUS: CPT | Performed by: INTERNAL MEDICINE

## 2022-06-21 PROCEDURE — 80053 COMPREHEN METABOLIC PANEL: CPT | Performed by: INTERNAL MEDICINE

## 2022-06-21 PROCEDURE — 94660 CPAP INITIATION&MGMT: CPT

## 2022-06-21 PROCEDURE — 99900031 HC PATIENT EDUCATION (STAT)

## 2022-06-21 PROCEDURE — S4991 NICOTINE PATCH NONLEGEND: HCPCS | Performed by: INTERNAL MEDICINE

## 2022-06-21 PROCEDURE — 27000221 HC OXYGEN, UP TO 24 HOURS

## 2022-06-21 PROCEDURE — 94761 N-INVAS EAR/PLS OXIMETRY MLT: CPT

## 2022-06-21 RX ORDER — ONDANSETRON 2 MG/ML
4 INJECTION INTRAMUSCULAR; INTRAVENOUS EVERY 6 HOURS PRN
Status: DISCONTINUED | OUTPATIENT
Start: 2022-06-21 | End: 2022-06-25 | Stop reason: HOSPADM

## 2022-06-21 RX ORDER — IPRATROPIUM BROMIDE 0.5 MG/2.5ML
0.5 SOLUTION RESPIRATORY (INHALATION)
Status: DISCONTINUED | OUTPATIENT
Start: 2022-06-21 | End: 2022-06-21

## 2022-06-21 RX ORDER — AMIODARONE HYDROCHLORIDE 200 MG/1
200 TABLET ORAL 2 TIMES DAILY
Status: DISCONTINUED | OUTPATIENT
Start: 2022-06-21 | End: 2022-06-25 | Stop reason: HOSPADM

## 2022-06-21 RX ORDER — LANOLIN ALCOHOL/MO/W.PET/CERES
400 CREAM (GRAM) TOPICAL DAILY
Status: DISCONTINUED | OUTPATIENT
Start: 2022-06-21 | End: 2022-06-25 | Stop reason: HOSPADM

## 2022-06-21 RX ADMIN — NICOTINE 1 PATCH: 7 PATCH, EXTENDED RELEASE TRANSDERMAL at 09:06

## 2022-06-21 RX ADMIN — ALPRAZOLAM 0.25 MG: 0.25 TABLET ORAL at 09:06

## 2022-06-21 RX ADMIN — CHLORHEXIDINE GLUCONATE 15 ML: 1.2 RINSE ORAL at 09:06

## 2022-06-21 RX ADMIN — MUPIROCIN: 20 OINTMENT TOPICAL at 08:06

## 2022-06-21 RX ADMIN — ENOXAPARIN SODIUM 40 MG: 40 INJECTION SUBCUTANEOUS at 04:06

## 2022-06-21 RX ADMIN — IPRATROPIUM BROMIDE AND ALBUTEROL SULFATE 3 ML: .5; 3 SOLUTION RESPIRATORY (INHALATION) at 07:06

## 2022-06-21 RX ADMIN — MEROPENEM AND SODIUM CHLORIDE 1 G: 1 INJECTION, SOLUTION INTRAVENOUS at 09:06

## 2022-06-21 RX ADMIN — ASPIRIN 81 MG CHEWABLE TABLET 81 MG: 81 TABLET CHEWABLE at 08:06

## 2022-06-21 RX ADMIN — AMIODARONE HYDROCHLORIDE 200 MG: 200 TABLET ORAL at 09:06

## 2022-06-21 RX ADMIN — OXYCODONE HYDROCHLORIDE AND ACETAMINOPHEN 1 TABLET: 10; 325 TABLET ORAL at 09:06

## 2022-06-21 RX ADMIN — MEROPENEM AND SODIUM CHLORIDE 1 G: 1 INJECTION, SOLUTION INTRAVENOUS at 05:06

## 2022-06-21 RX ADMIN — METOPROLOL TARTRATE 25 MG: 25 TABLET, FILM COATED ORAL at 08:06

## 2022-06-21 RX ADMIN — ONDANSETRON 4 MG: 2 INJECTION INTRAMUSCULAR; INTRAVENOUS at 12:06

## 2022-06-21 RX ADMIN — SIMETHICONE 80 MG: 80 TABLET, CHEWABLE ORAL at 04:06

## 2022-06-21 RX ADMIN — ONDANSETRON 4 MG: 2 INJECTION INTRAMUSCULAR; INTRAVENOUS at 11:06

## 2022-06-21 RX ADMIN — LOSARTAN POTASSIUM 12.5 MG: 25 TABLET, FILM COATED ORAL at 12:06

## 2022-06-21 RX ADMIN — MAGNESIUM SULFATE HEPTAHYDRATE 2 G: 40 INJECTION, SOLUTION INTRAVENOUS at 06:06

## 2022-06-21 RX ADMIN — PREDNISONE 30 MG: 5 TABLET ORAL at 09:06

## 2022-06-21 RX ADMIN — ALPRAZOLAM 0.25 MG: 0.25 TABLET ORAL at 11:06

## 2022-06-21 RX ADMIN — SIMETHICONE 80 MG: 80 TABLET, CHEWABLE ORAL at 10:06

## 2022-06-21 RX ADMIN — SMOFLIPID: 6; 6; 5; 3 INJECTION, EMULSION INTRAVENOUS at 09:06

## 2022-06-21 RX ADMIN — METOPROLOL TARTRATE 25 MG: 25 TABLET, FILM COATED ORAL at 09:06

## 2022-06-21 RX ADMIN — OXYCODONE HYDROCHLORIDE AND ACETAMINOPHEN 1 TABLET: 10; 325 TABLET ORAL at 02:06

## 2022-06-21 RX ADMIN — GUAIFENESIN 1200 MG: 600 TABLET, EXTENDED RELEASE ORAL at 08:06

## 2022-06-21 RX ADMIN — IPRATROPIUM BROMIDE AND ALBUTEROL SULFATE 3 ML: .5; 3 SOLUTION RESPIRATORY (INHALATION) at 11:06

## 2022-06-21 RX ADMIN — AMIODARONE HYDROCHLORIDE 200 MG: 200 TABLET ORAL at 12:06

## 2022-06-21 RX ADMIN — Medication 400 MG: at 12:06

## 2022-06-21 RX ADMIN — OXYCODONE HYDROCHLORIDE AND ACETAMINOPHEN 1 TABLET: 10; 325 TABLET ORAL at 08:06

## 2022-06-21 RX ADMIN — ALPRAZOLAM 0.25 MG: 0.25 TABLET ORAL at 04:06

## 2022-06-21 RX ADMIN — IPRATROPIUM BROMIDE AND ALBUTEROL SULFATE 3 ML: .5; 3 SOLUTION RESPIRATORY (INHALATION) at 04:06

## 2022-06-21 RX ADMIN — LATANOPROST 1 DROP: 50 SOLUTION OPHTHALMIC at 09:06

## 2022-06-21 RX ADMIN — IPRATROPIUM BROMIDE AND ALBUTEROL SULFATE 3 ML: .5; 3 SOLUTION RESPIRATORY (INHALATION) at 03:06

## 2022-06-21 RX ADMIN — MEROPENEM AND SODIUM CHLORIDE 1 G: 1 INJECTION, SOLUTION INTRAVENOUS at 02:06

## 2022-06-21 RX ADMIN — CHLORHEXIDINE GLUCONATE 15 ML: 1.2 RINSE ORAL at 08:06

## 2022-06-21 RX ADMIN — MUPIROCIN: 20 OINTMENT TOPICAL at 09:06

## 2022-06-21 NOTE — PT/OT/SLP PROGRESS
Physical Therapy      Patient Name:  Estelle Cast   MRN:  [id number]    Patient not seen today secondary to Other (Comment). First attempt pt eating breakfast, second attempt , on BSC and requesting additional time.Will follow-up

## 2022-06-21 NOTE — PLAN OF CARE
06/21/22 1615   Post-Acute Status   Post-Acute Authorization Placement;HME   Post-Acute Placement Status Patient List Provided   HME Status Referrals Sent   Coverage [facility]   Discharge Delays (!) Post-Acute Set-up   Discharge Plan   Discharge Plan A Skilled Nursing Facility   Discharge Plan B Home with family;Home Health     GLADYS talked with patient and patient sister (Dlsherineh) at [facility]. GLADYS provided Dlorah with SNF list, Dlorah not ready to complete patient choice form, she wants to communicate with other family members before making the decision. Dlorah asked to talk to MD about recommendations and SNF vs Home, SW asked Attending Physician via secure chat to discuss matter with patient and sister. Dlorah and patient requested medical power of  form. GLADYS provided Dlorah with 5 wishes booklet and Planning for incapacity Advance Directives of [place] forms.     Life vest referral sent to [facility] via [facility]. GLADYS called Geovanna to make her aware of referral, however Geovanna unavailable at this time. SW to try again at later time.

## 2022-06-21 NOTE — RESPIRATORY THERAPY
06/20/22 2005   Patient Assessment/Suction   Level of Consciousness (AVPU) alert   Respiratory Effort Normal;Unlabored   Expansion/Accessory Muscles/Retractions no use of accessory muscles   All Lung Fields Breath Sounds equal bilaterally;wheezes, expiratory   Rhythm/Pattern, Respiratory unlabored   Cough Frequency infrequent   PRE-TX-O2   O2 Device (Oxygen Therapy) High Flow nasal Cannula   Flow (L/min) 3   SpO2 97 %   Pulse Oximetry Type Continuous   $ Pulse Oximetry - Multiple Charge Pulse Oximetry - Multiple   Pulse 87   Resp 18   Aerosol Therapy   $ Aerosol Therapy Charges Aerosol Treatment   Daily Review of Necessity (SVN) completed   Respiratory Treatment Status (SVN) given   Treatment Route (SVN) mask   Patient Position (SVN) HOB elevated   Post Treatment Assessment (SVN) breath sounds unchanged   Signs of Intolerance (SVN) none   Breath Sounds Post-Respiratory Treatment   Post-treatment Heart Rate (beats/min) 88   Post-treatment Resp Rate (breaths/min) 20   Education   $ Education Bronchodilator;BiPAP;15 min   Respiratory Evaluation   $ Care Plan Tech Time 15 min   $ Eval/Re-eval Charges Re-evaluation   Evaluation For Re-Eval 3 day

## 2022-06-21 NOTE — SUBJECTIVE & OBJECTIVE
Interval History Ms     Review of Systems   Constitutional:  Positive for activity change, diaphoresis and fatigue.   HENT: Negative.     Eyes: Negative.    Respiratory:  Positive for cough, chest tightness, shortness of breath and wheezing.    Cardiovascular:  Positive for palpitations. Negative for chest pain.   Gastrointestinal:  Positive for abdominal distention. Negative for abdominal pain, nausea and vomiting.   Endocrine: Positive for heat intolerance.   Genitourinary: Negative.    Musculoskeletal:  Positive for arthralgias, gait problem and myalgias.   Skin: Negative.    Allergic/Immunologic: Negative.    Neurological:  Positive for weakness.   Hematological:  Bruises/bleeds easily.   Psychiatric/Behavioral:  Positive for dysphoric mood. The patient is nervous/anxious.    Objective:     Vital Signs (Most Recent):  Temp: 97.6 °F (36.4 °C) (06/21/22 0715)  Pulse: 95 (06/21/22 0810)  Resp: (!) 25 (06/21/22 0813)  BP: 135/78 (06/21/22 0810)  SpO2: 95 % (06/21/22 0730)   Vital Signs (24h Range):  Temp:  [96.9 °F (36.1 °C)-98.8 °F (37.1 °C)] 97.6 °F (36.4 °C)  Pulse:  [] 95  Resp:  [14-57] 25  SpO2:  [89 %-100 %] 95 %  BP: (100-152)/(62-98) 135/78     Weight: 47.6 kg (104 lb 15 oz)  Body mass index is 18.01 kg/m².    Intake/Output Summary (Last 24 hours) at 6/21/2022 0856  Last data filed at 6/21/2022 0600  Gross per 24 hour   Intake 2997.7 ml   Output 3225 ml   Net -227.3 ml      Physical Exam  Vitals and nursing note reviewed.   Constitutional:       General: She is not in acute distress.     Appearance: She is ill-appearing.   HENT:      Head: Normocephalic and atraumatic.      Nose: Nose normal.      Mouth/Throat:      Mouth: Mucous membranes are moist.   Eyes:      Extraocular Movements: Extraocular movements intact.      Pupils: Pupils are equal, round, and reactive to light.   Neck:      Comments: Use of accessory muscles with any movement  Cardiovascular:      Rate and Rhythm: Normal rate and regular  rhythm.   Pulmonary:      Comments: Increased respiratory effort  Overall diminished breath sounds  Abdominal:      General: There is distension.      Tenderness: There is no abdominal tenderness. There is no guarding or rebound.   Musculoskeletal:         General: Normal range of motion.      Cervical back: Normal range of motion and neck supple.   Skin:     General: Skin is warm.   Neurological:      Mental Status: She is oriented to person, place, and time.   Psychiatric:      Comments: Mildly dysphoric mood       Significant Labs: All pertinent labs within the past 24 hours have been reviewed.  Recent Lab Results         06/21/22  0456   06/20/22  1819   06/20/22  1207        Albumin 2.7           Alkaline Phosphatase 42           ALT 18           Anion Gap 4           AST 17           Baso # 0.00           Basophil % 0.0           BILIRUBIN TOTAL 0.6  Comment: For infants and newborns, interpretation of results should be based  on gestational age, weight and in agreement with clinical  observations.    Premature Infant recommended reference ranges:  Up to 24 hours.............<8.0 mg/dL  Up to 48 hours............<12.0 mg/dL  3-5 days..................<15.0 mg/dL  6-29 days.................<15.0 mg/dL               Comment: Values of less than 100 pg/ml are consistent with non-CHF populations.           BUN 13           Calcium 7.9           Chloride 89           CO2 41  Comment: CO2 critical result(s) repeated. Called and verbal readback obtained   from Danyelle Fuller Rn/2202 by MANDI 06/21/2022 05:46             Creatinine <0.3           Differential Method Automated           eGFR if  >60.0           eGFR if non  >60.0  Comment: Calculation used to obtain the estimated glomerular filtration  rate (eGFR) is the CKD-EPI equation.              Eos # 0.0           Eosinophil % 0.1           Glucose 117           Gran # (ANC) 6.4           Gran % 92.3           Hematocrit  31.0           Hemoglobin 9.4           Immature Grans (Abs) 0.03  Comment: Mild elevation in immature granulocytes is non specific and   can be seen in a variety of conditions including stress response,   acute inflammation, trauma and pregnancy. Correlation with other   laboratory and clinical findings is essential.             Immature Granulocytes 0.4           Lymph # 0.4           Lymph % 5.0           Magnesium 1.7   1.9   1.9       MCH 27.8           MCHC 30.3           MCV 92           Mono # 0.2           Mono % 2.2           MPV 8.7           nRBC 0           Phosphorus 2.6           Platelets 199           Potassium 4.0     4.1       PROTEIN TOTAL 5.5           RBC 3.38           RDW 14.8           Sodium 134           Triglycerides 105  Comment: The National Cholesterol Education Program (NCEP) has set the  following guidelines (reference values) for triglycerides:  Normal......................<150 mg/dL  Borderline High.............150-199 mg/dL  High........................200-499 mg/dL             Troponin I <0.030           WBC 6.97                   Significant Imaging: I have reviewed all pertinent imaging results/findings within the past 24 hours.

## 2022-06-21 NOTE — PROGRESS NOTES
"[facility]  Adult Nutrition   Progress Note (Nutrition Support Management)    SUMMARY     Recommendations/Interventions:  1. New TPN to start @ 1700. TPN to run @ 70 mL/hr over 24 hours.  2. Hyponatremia (134), Hypophosphatemia (2.6), elevated CO2 (41)-continue to monitor and manage per MD.  3. Continue to advance diet as medically able.  4. RD to monitor diet progression, labs, plan of care, and make recommendations accordingly.  Goals: 1. Patient to tolerate TPN. 2. Diet to advance as medically able. 2. Electrolytes monitored and managed.  Nutrition Goal Status: progressing towards goal    Dietitian Rounds Brief:  · TPN to continue. New TPN ordered.  Patient started on regular diet this morning. She had already consumed broth for breakfast and then starting bloating. Patient said that when she was at home-if she took her "gas medicine" prior to eating she would not bloat as bad. Informed patient to ask MD or RN if they could give her medicine prior to eating. Not adding ensure enlive just yet if clear liquids are causing her to bloat. Will monitor intake and meal tolerance.    PARENTERAL NUTRITION PROGRESS NOTE:      Parenteral Nutrition Day # 2  Diagnosis/Indication for PN: SBO  IV Access: PICC  Diet/Tube Feeding: Adult Regular (IDDSI Level 7)         Admixture Type: Custome 3-in-1 TPN  Infusion Rate and Frequency: 70 mL/hr   Patient Acuity:Acute    PN Composition:   95 grams Amino Acid, 90 grams Dextrose, and 50 grams Lipid.    Today's TPN provides 1186 kcal.  *Dextrose is started at less than full dextrose goal to reduce risk for Refeeding Syndrome. Dextrose content will be advanced as appropriate over the next few days.    Please see order for electrolytes and additives content and adjustments.   *The Nutrition Support Team will continue to monitor electrolytes daily and adjust parenteral nutrition as warranted.    Reason for Assessment  Reason For Assessment: consult, new TPN  Diagnosis:  " "(Acute on chronic respiratory failure requiring mechanical ventilation)  Relevant Medical History: COPD, depression, generalized anxiety, DANIS, extensive smoking, severe malnutrition, anemia  Interdisciplinary Rounds: attended    Nutrition Risk Screen  Nutrition Risk Screen: tube feeding or parenteral nutrition    Nutrition/Diet History  Food Allergies:  (shellfish containing products)  Factors Affecting Nutritional Intake: clear liquid diet, altered gastrointestinal function    Anthropometrics  Temp: 96.9 °F (36.1 °C)  Height Method: Stated  Height: 5' 4" (162.6 cm)  Height (inches): 64 in  Weight Method: Bed Scale  Weight: 47.6 kg (104 lb 15 oz)  Weight (lb): 104.94 lb  Ideal Body Weight (IBW), Female: 120 lb  % Ideal Body Weight, Female (lb): 71.67 %  BMI (Calculated): 18  BMI Grade: 17 - 18.4 protein-energy malnutrition grade I     Weight History:  Wt Readings from Last 10 Encounters:   06/17/22 47.6 kg (104 lb 15 oz)   06/18/22 47.2 kg (104 lb)     Lab/Procedures/Meds: Pertinent Labs Reviewed  Clinical Chemistry:  Recent Labs   Lab 06/19/22  0514 06/20/22  0229 06/21/22  0456   * 133* 134*   K 4.0 3.3* 4.0   CL 87* 87* 89*   CO2 39* 41* 41*   * 88 117*   BUN 20 20 13   CREATININE 0.3* 0.3* <0.3*   CALCIUM 8.2* 7.8* 7.9*   PROT 5.5* 5.4* 5.5*   ALBUMIN 2.6* 2.6* 2.7*   BILITOT 0.6 0.6 0.6   ALKPHOS 48* 45* 42*   AST 27 23 17   ALT 25 22 18   ANIONGAP 6* 5* 4*   ESTGFRAFRICA >60.0 >60.0 >60.0   EGFRNONAA >60.0 >60.0 >60.0   MG 1.8 1.7 1.7   PHOS 3.4 4.7* 2.6*    < > = values in this interval not displayed.     CBC:   Recent Labs   Lab 06/21/22  0456   WBC 6.97   RBC 3.38*   HGB 9.4*   HCT 31.0*      MCV 92   MCH 27.8   MCHC 30.3*     Lipid Panel:  Recent Labs   Lab 06/21/22  0456   TRIG 105     Cardiac Profile:  Recent Labs   Lab 06/17/22  1307 06/21/22  0456   BNP 60 277*   TROPONINI <0.030 <0.030     Thyroid & Parathyroid:  Recent Labs   Lab 06/20/22  0229   TSH 1.940     Medications: " Pertinent Medications reviewed  Scheduled Meds:   albuterol-ipratropium  3 mL Nebulization Q4H    aspirin  81 mg Oral Daily    chlorhexidine  15 mL Mouth/Throat BID    enoxaparin  40 mg Subcutaneous Daily    guaiFENesin  1,200 mg Oral Daily    latanoprost  1 drop Both Eyes QHS    meropenem (MERREM) IVPB  1 g Intravenous Q8H    methylPREDNISolone sodium succinate injection  40 mg Intravenous Q12H    metoprolol tartrate  25 mg Oral BID    mupirocin   Nasal BID    nicotine  1 patch Transdermal Daily     Continuous Infusions:   TPN ADULT CENTRAL LINE CUSTOM (3 in 1) 75 mL/hr at 06/20/22 1902     PRN Meds:.acetaminophen, albuterol-ipratropium, ALPRAZolam, calcium gluconate IVPB, calcium gluconate IVPB, calcium gluconate IVPB, dextrose 50%, dextrose 50%, glucagon (human recombinant), glucose, glucose, labetalol, magnesium oxide, magnesium oxide, magnesium sulfate IVPB, magnesium sulfate IVPB, naloxone, oxyCODONE-acetaminophen, polyethylene glycol, potassium bicarbonate, potassium bicarbonate, potassium bicarbonate, potassium chloride in water **AND** potassium chloride in water **AND** potassium chloride in water, prochlorperazine, simethicone, sodium chloride 0.9%, sodium chloride 0.9%, sodium phosphate IVPB, sodium phosphate IVPB, sodium phosphate IVPB    Antibiotics (From admission, onward)            Start     Stop Route Frequency Ordered    06/20/22 0945  mupirocin 2 % ointment  (MRSA Decolonization Orders ST)         06/25 0859 Nasl 2 times daily 06/20/22 0839    06/18/22 0500  meropenem-0.9% sodium chloride 1 g/50 mL IVPB         -- IV Every 8 hours (non-standard times) 06/17/22 2317        Estimated/Assessed Needs  Weight Used For Calorie Calculations: 47.6 kg (104 lb 15 oz)  Energy Calorie Requirements (kcal): 4220-2396 (35-40kcal/kg once extubated)  Energy Need Method: Kcal/kg  Protein Requirements: 72-95 g/day (1.5-2.0 g/kg)  Weight Used For Protein Calculations: 47.6 kg (104 lb 15 oz)  Fluid  Requirements (mL): 1 mL/kcal or per MD     RDA Method (mL): 1666     Nutrition Prescription Ordered    Current Diet Order: Clear Liquid Diet  Current Nutrition Support Formula Ordered: Other (Comment) (Custom 3-in-1 TPN)  Current Nutrition Support Rate Ordered: 75 (ml)  Current Nutrition Support Frequency Ordered: mL/hr; continuous    Evaluation of Received Nutrient/Fluid Intake    Parenteral Calories (kcal): 1026  Parenteral Protein (gm): 72  Parenteral Fluid (mL): 1800  Lipid Calories (kcals): 500 kcals  Energy Calories Required: not meeting needs  Protein Required: meeting needs  Fluid Required: meeting needs  Tolerance: tolerating  % Intake of Estimated Energy Needs: 25 - 50 %  % Meal Intake: NPO    Intake/Output Summary (Last 24 hours) at 6/21/2022 0728  Last data filed at 6/21/2022 0600  Gross per 24 hour   Intake 2997.7 ml   Output 3225 ml   Net -227.3 ml      Nutrition Risk    Level of Risk/Frequency of Follow-up: high   Monitor and Evaluation    Food and Nutrient Intake: energy intake, food and beverage intake, parenteral nutrition intake  Food and Nutrient Adminstration: diet order, enteral and parenteral nutrition administration  Physical Activity and Function: nutrition-related ADLs and IADLs, factors affecting access to physical activity  Anthropometric Measurements: body mass index, weight change, weight  Biochemical Data, Medical Tests and Procedures: electrolyte and renal panel, gastrointestinal profile, glucose/endocrine profile, inflammatory profile, lipid profile  Nutrition-Focused Physical Findings: overall appearance     Nutrition Follow-Up    RD Follow-up?: Yes  Dee Wakefield, DON 06/21/2022 9:17 AM

## 2022-06-21 NOTE — ASSESSMENT & PLAN NOTE
Respiratory status remains very tenuous and at high risk for re-intubation  Continue scheduled DuoNebs, increase IV steroids 40 q.6  Suspect severe COPD    6/21/2022  Ms Cast notes reduced SOB but continued LAWSON  Her overall prognosis is very poor  I believe that she is stable for transfer to cardio A  Pulmonology input appreciated  Case discussed with Pt and sister  Consider SNF placement

## 2022-06-21 NOTE — PROGRESS NOTES
[facility] Medicine  Progress Note    Patient Name: Estelle Cast  MRN: [id number]  Patient Class: IP- Inpatient   Admission Date: 6/17/2022  Length of Stay: 4 days  Attending Physician: Ben Leung MD  Primary Care Provider: Primary Doctor No        Subjective:     Principal Problem:COPD (chronic obstructive pulmonary disease)        HPI:  56-year-old with past medical history significant for COPD, BMI 14.8, depression, generalized anxiety, DANIS, extensive smoking history presenting with worsening shortness breath.  At baseline she uses 3 L it was reported that her oxygen was in the low 80s.  Minimally productive cough.  Chronic diarrhea unchanged from baseline.  No fevers, chills, nausea, vomiting, sick contacts.  No chest pain.  Patient has history of chronic abdominal pain from bowel resection from severe sepsis episode in the past.  On presentation she had been complaining of some upper abdominal pain that was mild.  Family recently relocated from [place].    At time of my evaluation the patient had been intubated for worsening shortness of breath and acute respiratory decompensation with code.  Per ED physician verbal report, patient had gone into torsades and subsequently V-tach.  CPR, epinephrine, amiodarone, shock, and magnesium were given.  Patient responded and is now intubated.  Approximate time of code was less than 10 minutes.  She is currently on Levophed drip.  She is alert and able to give the thumbs-up sign.  My history is limited to report from emergency department.    ED lab significant for WBC 18, Na 134, CO2 48, creatinine 0.3, BNP 60.  Troponins negative.  Lactic acid negative.  Procalcitonin negative.    Chest x-ray significant for tiny interstitial opacity and right upper lobe and right lower lung base.  Lungs are hyperexpanded consistent with emphysema.    In the ED she received a dose of the azithromycin, ceftriaxone, and later meropenem after she was  intubated.  She is also received a dose of Solu-Medrol and DuoNeb treatments.        Overview/Hospital Course:  Assumed care of this patient on 6/18/22.  Patient recently relocated to [place] to live with sister from [place].  Known history of COPD, chronic respiratory failure on 3 L home oxygen, continued tobacco use.  States in December needed emergent surgery, was taken to [facility], had abdominal resection, since then she is followed by surgery and Gastroenterology, chronic abdominal pain, intermittent worsening, loose stools and as per report seems she was placed on b.i.d. Questran.  Initially presented to the ED due to worsening abdominal pain.  Subsequently with worsening respiratory status, cough productive of greenish phlegm, cardiopulmonary arrest (reported torsades with VT after azithromycin and EKG with QT prolongation), status post ROSC, intubated and transferred to the ICU.  Chest x-ray with concern for right lung pneumonia, likely aspiration.  CT abdomen with concern for large stool burden large bowel and concern for partial colonic obstruction.  Patient is severely malnourished.  On 06/18 remains orally intubated but awake, alert, communicating via writing, copious amounts of phlegm production, denies any abdominal pain, states she is not passing flatus, no bowel movements today.  On 06/19, EKG with continued QTC prolongation, echo now with EF of 25% with grade 1 diastolic dysfunction, started on metoprolol, cardiology following.  Plan is attempted extubation to BiPAP.  PICC line when able to start TPN.    6/21/2022  Ms Cast is sitting up and feeding herself in a chair. She notes improved SOB but continued LAWSON. I want to have s decent life with the time I have left.       Interval History Ms     Review of Systems   Constitutional:  Positive for activity change, diaphoresis and fatigue.   HENT: Negative.     Eyes: Negative.    Respiratory:  Positive for cough, chest tightness, shortness  of breath and wheezing.    Cardiovascular:  Positive for palpitations. Negative for chest pain.   Gastrointestinal:  Positive for abdominal distention. Negative for abdominal pain, nausea and vomiting.   Endocrine: Positive for heat intolerance.   Genitourinary: Negative.    Musculoskeletal:  Positive for arthralgias, gait problem and myalgias.   Skin: Negative.    Allergic/Immunologic: Negative.    Neurological:  Positive for weakness.   Hematological:  Bruises/bleeds easily.   Psychiatric/Behavioral:  Positive for dysphoric mood. The patient is nervous/anxious.    Objective:     Vital Signs (Most Recent):  Temp: 97.6 °F (36.4 °C) (06/21/22 0715)  Pulse: 95 (06/21/22 0810)  Resp: (!) 25 (06/21/22 0813)  BP: 135/78 (06/21/22 0810)  SpO2: 95 % (06/21/22 0730)   Vital Signs (24h Range):  Temp:  [96.9 °F (36.1 °C)-98.8 °F (37.1 °C)] 97.6 °F (36.4 °C)  Pulse:  [] 95  Resp:  [14-57] 25  SpO2:  [89 %-100 %] 95 %  BP: (100-152)/(62-98) 135/78     Weight: 47.6 kg (104 lb 15 oz)  Body mass index is 18.01 kg/m².    Intake/Output Summary (Last 24 hours) at 6/21/2022 0856  Last data filed at 6/21/2022 0600  Gross per 24 hour   Intake 2997.7 ml   Output 3225 ml   Net -227.3 ml      Physical Exam  Vitals and nursing note reviewed.   Constitutional:       General: She is not in acute distress.     Appearance: She is ill-appearing.   HENT:      Head: Normocephalic and atraumatic.      Nose: Nose normal.      Mouth/Throat:      Mouth: Mucous membranes are moist.   Eyes:      Extraocular Movements: Extraocular movements intact.      Pupils: Pupils are equal, round, and reactive to light.   Neck:      Comments: Use of accessory muscles with any movement  Cardiovascular:      Rate and Rhythm: Normal rate and regular rhythm.   Pulmonary:      Comments: Increased respiratory effort  Overall diminished breath sounds  Abdominal:      General: There is distension.      Tenderness: There is no abdominal tenderness. There is no guarding  or rebound.   Musculoskeletal:         General: Normal range of motion.      Cervical back: Normal range of motion and neck supple.   Skin:     General: Skin is warm.   Neurological:      Mental Status: She is oriented to person, place, and time.   Psychiatric:      Comments: Mildly dysphoric mood       Significant Labs: All pertinent labs within the past 24 hours have been reviewed.  Recent Lab Results         06/21/22  0456   06/20/22  1819   06/20/22  1207        Albumin 2.7           Alkaline Phosphatase 42           ALT 18           Anion Gap 4           AST 17           Baso # 0.00           Basophil % 0.0           BILIRUBIN TOTAL 0.6  Comment: For infants and newborns, interpretation of results should be based  on gestational age, weight and in agreement with clinical  observations.    Premature Infant recommended reference ranges:  Up to 24 hours.............<8.0 mg/dL  Up to 48 hours............<12.0 mg/dL  3-5 days..................<15.0 mg/dL  6-29 days.................<15.0 mg/dL               Comment: Values of less than 100 pg/ml are consistent with non-CHF populations.           BUN 13           Calcium 7.9           Chloride 89           CO2 41  Comment: CO2 critical result(s) repeated. Called and verbal readback obtained   from Danyelle Fuller Rn/2202 by AS2 06/21/2022 05:46             Creatinine <0.3           Differential Method Automated           eGFR if  >60.0           eGFR if non  >60.0  Comment: Calculation used to obtain the estimated glomerular filtration  rate (eGFR) is the CKD-EPI equation.              Eos # 0.0           Eosinophil % 0.1           Glucose 117           Gran # (ANC) 6.4           Gran % 92.3           Hematocrit 31.0           Hemoglobin 9.4           Immature Grans (Abs) 0.03  Comment: Mild elevation in immature granulocytes is non specific and   can be seen in a variety of conditions including stress response,   acute  inflammation, trauma and pregnancy. Correlation with other   laboratory and clinical findings is essential.             Immature Granulocytes 0.4           Lymph # 0.4           Lymph % 5.0           Magnesium 1.7   1.9   1.9       MCH 27.8           MCHC 30.3           MCV 92           Mono # 0.2           Mono % 2.2           MPV 8.7           nRBC 0           Phosphorus 2.6           Platelets 199           Potassium 4.0     4.1       PROTEIN TOTAL 5.5           RBC 3.38           RDW 14.8           Sodium 134           Triglycerides 105  Comment: The National Cholesterol Education Program (NCEP) has set the  following guidelines (reference values) for triglycerides:  Normal......................<150 mg/dL  Borderline High.............150-199 mg/dL  High........................200-499 mg/dL             Troponin I <0.030           WBC 6.97                   Significant Imaging: I have reviewed all pertinent imaging results/findings within the past 24 hours.      Assessment/Plan:      * COPD (chronic obstructive pulmonary disease)  Respiratory status remains very tenuous and at high risk for re-intubation  Continue scheduled DuoNebs, increase IV steroids 40 q.6  Suspect severe COPD    6/21/2022  Ms Cast notes reduced SOB but continued LAWSON  Her overall prognosis is very poor  I believe that she is stable for transfer to cardio A  Pulmonology input appreciated    Hypokalemia  Potassium 3.3, replaced as per sliding scale to keep greater than 4    QT prolongation  With torsades and VT in the ED  Avoid multiple QTC prolonging medications, checking EKG daily-QTC this morning 495  Keep potassium greater than 4, magnesium greater than 2      Cardiomyopathy  Echo this admission with EF of 25% with grade 1 diastolic dysfunction  Denies any previous known history of heart disease  On 06/18 started on metoprolol, adjust goal-directed therapy as able-with up trending blood pressure will discussed with Cardiology ACE versus  Entresto  Will need left heart catheterization/further evaluation as per Cardiology  Monitor for signs of volume overload  Appreciate cardiology input      Hyponatremia  Optimizing nutrition as able and following      Anemia  Chronic, no evidence of active bleeding, monitoring      Severe malnutrition  Chronic issue.  There was concern for bowel obstruction, surgery following, KUB this morning with nonobstructive bowel gas pattern  Central line in place, start TPN, consult for PICC line    BALDEMAR (generalized anxiety disorder)  Continue p.r.n. benzodiazepine and monitoring      VT (ventricular tachycardia)  QTC prolongation with torsades and ventricular tachycardia.  No further episodes.  Started on metoprolol.  Continuous cardiac monitoring      VT/torsades cardiac arrest with ROSC  Patient had ROSC, see QT and cardiomyopathy        Smoker  Nicotine patch in place.  Continue to reinforce smoking cessation    Right lung pneumonia, likely aspiration  Respiratory sample with no pathogens but radiological evidence of right lung pneumonia and suspect aspiration  Continue meropenem, adjust as needed      Obstructive sleep apnea syndrome  Has had multiple admissions for hypercapnic respiratory failure as per report  Reportedly has NIP VV at home  Continue BiPAP      Acute on chronic respiratory failure requiring mechanical ventilation  Intubated in the ED. On 06/19 extubated to BiPAP  P.r.n. ABG and chest x-ray  Pulmonary following    Abdominal pain  Acute on chronic  CT is concerning for bowel obstruction with large stool burden, seen by General surgery and given enema on 06/19  KUB 6/20 nonobstructive bowel gas pattern  Trial of clear liquid diet and serial abdominal examination        BMI less than 19,adult  See malnutrition      VTE Risk Mitigation (From admission, onward)         Ordered     enoxaparin injection 40 mg  Daily         06/17/22 1547     Place sequential compression device  Until discontinued          06/17/22 2317     IP VTE HIGH RISK PATIENT  Once         06/17/22 2317     Reason for no Mechanical VTE Prophylaxis  Once        Question:  Reasons:  Answer:  Physician Provided (leave comment)    06/17/22 2317                Discharge Planning   LIZANDRO: 6/23/2022     Code Status: Full Code   Is the patient medically ready for discharge?:     Reason for patient still in hospital (select all that apply): Treatment, Consult recommendations and Pending disposition  Discharge Plan A: Home with family                  Ben Leung MD  Department of Hospital Medicine   [facility]

## 2022-06-21 NOTE — PROGRESS NOTES
Progress Note  PULMONARY    Admit Date: 6/17/2022 06/21/2022  History of Present Illness:  Pt is a 57 yo female with COPD, malnutrition who presented to ED with resp distress and experienced code blue after arrival requiring 10min CPR, mag for torsades then had ROSC, intubated and was waking up and responsive. Pt admitted to ICU and pulmonary is consulted for further management/recommendations.  Further history is obtained over the phone with pt's sister and RAMIREZ Cassidy. She states pt was living in [place] where she had a pulmonologist caring for her, had very poor health with frequent admissions lately for COPD with CO2 retention (10 times so far this year) and moved to [place] to stay w/ sister 4 days ago. She developed acute on chronic abdominal pain and distention which made it difficult for her to breathe then had difficulty keeping sats up with home O2- uses 3L continuously. She was supposed to have NIV for home but had difficulty tolerating it and then difficulty getting it in [place]. Pt has had SBO requiring bowel resection 12/2021 and has had complications with abd pain, trouble eating off and on since then. She is noted to have SBO on this admission and surgery is consulted for further recommendations. Pt is a current smoker and wants to quit.       SUBJECTIVE:     6/19- pt waking up and writing notes to staff, continues on vent. cpap trial passed    6/20/2022 - Stable overnight but has remained on BiPAP.  In no distress.  She is anxious.  She shows very little insight into her overall health condition.  No new issues reported.  Had small BM yesterday but does feel that her abdomen is distended.  She is on Clinimix - to have PICC line placed (does have R CVL)    6/21/2022 - Stable overnight and has tolerated NC O2, using BiPAP when sleeping.  Tolerating liquids and is on TPN.  No new issues reported.  Remains weak and frail overall.  EKG noted this AM (cardiology following).      OBJECTIVE:      Vitals (Most recent):  Vitals:    06/21/22 0730   BP: 128/80   Pulse: 90   Resp: 19   Temp:        Vitals (24 hour range):  Temp:  [96.9 °F (36.1 °C)-98.8 °F (37.1 °C)]   Pulse:  []   Resp:  [14-57]   BP: (100-152)/(62-98)   SpO2:  [89 %-100 %]       Intake/Output Summary (Last 24 hours) at 6/21/2022 0809  Last data filed at 6/21/2022 0600  Gross per 24 hour   Intake 2997.7 ml   Output 3225 ml   Net -227.3 ml          Physical Exam:  The patient's neuro status (alertness,orientation,cognitive function,motor skills,), pharyngeal exam (oral lesions, hygiene, abn dentition,), Neck (jvd,mass,thyroid,nodes in neck and above/below clavicle),RESPIRATORY(symmetry,effort,fremitus,percussion,auscultation),  Cor(rhythm,heart tones including gallops,perfusion,edema)ABD(distention,hepatic&splenomegaly,tenderness,masses), Skin(rash,cyanosis),Psyc(affect,judgement,).  Exam negative except for these pertinent findings:      Awake, alert, on BiPAP  Clear but diminished breath sounds, + Ansari's sign  Abdomen is soft, nontender, BS+ and more active  No edema  Thin, cachectic   Bruising bilat arms    Radiographs reviewed: view by direct vision   CXR 6/19- hyperinflated, no significant change    Labs     Recent Labs   Lab 06/21/22  0456   WBC 6.97   HGB 9.4*   HCT 31.0*        Recent Labs   Lab 06/21/22  0456   *   K 4.0   CL 89*   CO2 41*   BUN 13   CREATININE <0.3*   *   CALCIUM 7.9*   MG 1.7   PHOS 2.6*   AST 17   ALT 18   ALKPHOS 42*   BILITOT 0.6   PROT 5.5*   ALBUMIN 2.7*   TROPONINI <0.030   *     No results for input(s): PH, PCO2, PO2, HCO3 in the last 24 hours.  Microbiology Results (last 7 days)     Procedure Component Value Units Date/Time    Blood culture #1 **CANNOT BE ORDERED STAT** [[id number]] Collected: 06/17/22 1307    Order Status: Completed Specimen: Blood from Peripheral, Antecubital, Left Updated: 06/20/22 1432     Blood Culture, Routine No Growth to date      No Growth to date       No Growth to date      No Growth to date    Blood culture #2 **CANNOT BE ORDERED STAT** [[id number]] Collected: 06/17/22 1320    Order Status: Completed Specimen: Blood from Peripheral, Antecubital, Right Updated: 06/20/22 1432     Blood Culture, Routine No Growth to date      No Growth to date      No Growth to date      No Growth to date    Culture, Respiratory with Gram Stain [[id number]] Collected: 06/18/22 1535    Order Status: Completed Specimen: Respiratory from Tracheal Aspirate Updated: 06/20/22 0655     Respiratory Culture No normal respiratory mane      No growth     Gram Stain (Respiratory) Many WBC's     Gram Stain (Respiratory) <10 epithelial cells per low power field.     Gram Stain (Respiratory) No organisms seen    MRSA Screen by PCR [[id number]] Collected: 06/18/22 0121    Order Status: Completed Specimen: Nasopharyngeal Swab from Nasal Updated: 06/18/22 0358     MRSA SCREEN BY PCR Negative          Impression:  Active Hospital Problems    Diagnosis  POA    *Acute on chronic respiratory failure requiring mechanical ventilation [J96.10]  Yes    Hypokalemia [E87.6]  No    Acute hypercapnic respiratory failure [J96.02]  Yes    Cardiomyopathy [I42.9]  Yes     Chronic    QT prolongation [R94.31]  Yes     Chronic    VT (ventricular tachycardia) [I47.2]  Clinically Undetermined    BALDEMAR (generalized anxiety disorder) [F41.1]  Yes     Chronic    Severe malnutrition [E43]  Yes     Chronic    COPD (chronic obstructive pulmonary disease) [J44.9]  Yes     Chronic    Anemia [D64.9]  Yes     Chronic    Hyponatremia [E87.1]  No    VT/torsades cardiac arrest with ROSC [I46.9]  Yes     Patient coded in emergency department  Intubated  Admit to ICU  Consult to Critical Care      Right lung pneumonia, likely aspiration [J18.9]  Clinically Undetermined    Smoker [F17.200]  Yes    Abdominal pain [R10.9]  Yes    BMI less than 19,adult [Z68.1]  Not Applicable    Obstructive sleep apnea syndrome [G47.33]   Yes      Resolved Hospital Problems    Diagnosis Date Resolved POA    Possible bowel obstruction  [K56.609] 06/20/2022 Yes    Leucocytosis [D72.829] 06/19/2022 Yes               Plan:     - BiPAP as needed and when sleeping, O2 as able - her baseline paCO2 is probably 70-80  - caution with sedating meds and opiates given severe respiratory failure  - continue inhaled bronchodilators  - change to oral prednisone  - TPN for nutritional support and advance diet as able  - continue meropenem for pneumonia  - f/u sputum culture  - pepcid for GI prophylaxis  - lovenox for DVT prophylaxis  - further cardiac management per cardiology  - overall prognosis is very poor with severe lung disease, severe CM, severe malnutrition          Delfino Cano MD  [facility] Pulmonary/Critical Care                                          .

## 2022-06-21 NOTE — PROGRESS NOTES
[facility]  Department of Cardiology  Progress Note      PATIENT NAME: Estelle Cast    MRN: [id number]  TODAY'S DATE: 06/21/2022  ADMIT DATE: 6/17/2022                          CONSULT REQUESTED BY: Ben Leung MD    SUBJECTIVE     PRINCIPAL PROBLEM: COPD (chronic obstructive pulmonary disease)    6/21/2022    Patient sitting up in the chair in no acute distress. Vital signs stable. No arythmias overnight.   Sister at bedside.       6/20/2022    Patient is off the ventilator. She denies chest pain. She has a BMI 17.85. K is 3.3. and Magnesium is 1.7.      6/19/22:  Patient seen resting in bed on CPAP/BiPAP.  Her sister is at bedside and is currently going through stacks of records.  According to the patient she has not had a cardiac angiogram in the past but has had a workup.  Currently she is in sinus rhythm with occasional PVCs which have improved.    REASON FOR CONSULT:    From H&P: 56-year-old with past medical history significant for COPD, BMI 14.8, depression, generalized anxiety, DANIS, extensive smoking history presenting with worsening shortness breath.  At baseline she uses 3 L it was reported that her oxygen was in the low 80s.  Minimally productive cough.  Chronic diarrhea unchanged from baseline.  No fevers, chills, nausea, vomiting, sick contacts.  No chest pain.  Patient has history of chronic abdominal pain from bowel resection from severe sepsis episode in the past.  On presentation she had been complaining of some upper abdominal pain that was mild.  Family recently relocated from [place].     At time of my evaluation the patient had been intubated for worsening shortness of breath and acute respiratory decompensation with code.  Per ED physician verbal report, patient had gone into torsades and subsequently V-tach.  CPR, epinephrine, amiodarone, shock, and magnesium were given.  Patient responded and is now intubated.  Approximate time of code was less than 10 minutes.  She  is currently on Levophed drip.  She is alert and able to give the thumbs-up sign.  My history is limited to report from emergency department.     ED lab significant for WBC 18, Na 134, CO2 48, creatinine 0.3, BNP 60.  Troponins negative.  Lactic acid negative.  Procalcitonin negative.     Chest x-ray significant for tiny interstitial opacity and right upper lobe and right lower lung base.  Lungs are hyperexpanded consistent with emphysema.     In the ED she received a dose of the azithromycin, ceftriaxone, and later meropenem after she was intubated.  She is also received a dose of Solu-Medrol and DuoNeb treatments.           No new subjective & objective note has been filed under this hospital service since the last note was generated.      HPI:    Patient is a 56-year-old female who presented to the emergency room with worsening shortness of breath.  Patient was noted to be hypoxic with O2 sat in the 80s.  In the emergency room the patient went into torsades and subsequently ventricular tachycardia according to H&P.  Patient was coded with ACLS protocol and achieved Rosc.  She was intubated and put in the ICU at which time she was placed on a Levophed drip.    Patient's niece was present at bedside this morning, but she was not aware of the patient's medical history other than that she had been having some abdominal pain at her home in [place] and decided to come over and stay with her sister.      Review of patient's allergies indicates:   Allergen Reactions    Celexa [citalopram]     Breztri aerosphere [budesonide-glycopyr-formoterol]     Pcn [penicillins]     Shellfish containing products     Advair diskus [fluticasone propion-salmeterol] Rash    Azithromycin Rash    Buspirone (bulk) Rash    Chantix [varenicline] Nausea And Vomiting       Past Medical History:   Diagnosis Date    Anemia, unspecified     Asthma     Chronic respiratory failure with hypoxia     COPD (chronic obstructive pulmonary  disease)     Dysuria     Hypokalemia     Hypomagnesemia     Hyponatremia     Malnutrition     Sepsis, unspecified organism      Past Surgical History:   Procedure Laterality Date    ABDOMINAL SURGERY      CHOLECYSTECTOMY      COLECTOMY      12/2021    HYSTERECTOMY       Social History     Tobacco Use    Smoking status: Current Every Day Smoker     Packs/day: 0.50    Smokeless tobacco: Never Used   Substance Use Topics    Alcohol use: Not Currently    Drug use: Yes     Types: Marijuana     Comment: x 5 days ago        REVIEW OF SYSTEMS      OBJECTIVE     VITAL SIGNS (Most Recent)  Temp: 97.6 °F (36.4 °C) (06/21/22 0715)  Pulse: 80 (06/21/22 1000)  Resp: (!) 22 (06/21/22 1000)  BP: 117/75 (06/21/22 1000)  SpO2: 99 % (06/21/22 1000)    VENTILATION STATUS  Resp: (!) 22 (06/21/22 1000)  SpO2: 99 % (06/21/22 1000)  Oxygen Concentration (%):  [32-36] 32    I & O (Last 24H):    Intake/Output Summary (Last 24 hours) at 6/21/2022 1039  Last data filed at 6/21/2022 0600  Gross per 24 hour   Intake 2997.7 ml   Output 3225 ml   Net -227.3 ml       WEIGHTS  Wt Readings from Last 1 Encounters:   06/17/22 2245 47.6 kg (104 lb 15 oz)   06/17/22 1100 39 kg (86 lb)       PHYSICAL EXAM  CONSTITUTIONAL: No fever, no chills; very thin female in no acute distress lying in bed  HEENT: Normocephalic, atraumatic,pupils reactive to light                 NECK:  No JVD no carotid bruit  CVS: S1S2+, RRR  LUNGS: Diminished  ABDOMEN: Soft, NT, BS+  EXTREMITIES: No cyanosis, edema  : + eugene catheter  NEURO: AAO X 3      HOME MEDICATIONS:  No current facility-administered medications on file prior to encounter.     Current Outpatient Medications on File Prior to Encounter   Medication Sig Dispense Refill    acetaminophen (TYLENOL) 500 MG tablet Take 500 mg by mouth every 6 (six) hours as needed for Pain.      albuterol (ACCUNEB) 0.63 mg/3 mL Nebu Take 0.63 mg by nebulization every 6 (six) hours as needed. Rescue      ALPRAZolam  (XANAX) 0.25 MG tablet Take by mouth 3 (three) times daily.      azelastine (ASTELIN) 137 mcg (0.1 %) nasal spray 1 spray by Nasal route 2 (two) times daily.      azithromycin (Z-CAMI) 250 MG tablet Take 500 mg by mouth once daily.      fluticasone furoate (ARNUITY ELLIPTA) 200 mcg/actuation inhaler Inhale 200 mcg into the lungs. Controller      guaiFENesin (MUCINEX) 600 mg 12 hr tablet Take 1,200 mg by mouth once daily.      ipratropium (ATROVENT HFA) 17 mcg/actuation inhaler Inhale 2 puffs into the lungs every 6 (six) hours. Rescue      ipratropium/albuterol sulfate (DUONEB INHL) Inhale into the lungs.      latanoprost, PF, 0.005 % Drop Apply to eye.      neomycin-polymyxin-hydrocortisone (CORTISPORIN) 3.5-10,000-10 mg-unit-mg/mL ophthalmic suspension 1 drop every 4 (four) hours.      nicotine (NICODERM CQ) 14 mg/24 hr Place 1 patch onto the skin every 24 hours.      pantoprazole (PROTONIX) 40 MG tablet Take 40 mg by mouth once daily.         SCHEDULED MEDS:   albuterol-ipratropium  3 mL Nebulization Q4H    amiodarone  200 mg Oral BID    aspirin  81 mg Oral Daily    chlorhexidine  15 mL Mouth/Throat BID    enoxaparin  40 mg Subcutaneous Daily    guaiFENesin  1,200 mg Oral Daily    latanoprost  1 drop Both Eyes QHS    losartan  12.5 mg Oral Daily    meropenem (MERREM) IVPB  1 g Intravenous Q8H    metoprolol tartrate  25 mg Oral BID    mupirocin   Nasal BID    nicotine  1 patch Transdermal Daily    predniSONE  30 mg Oral Daily       CONTINUOUS INFUSIONS:   TPN ADULT CENTRAL LINE CUSTOM (3 in 1) 75 mL/hr at 06/20/22 1902    TPN ADULT CENTRAL LINE CUSTOM (3 in 1)         PRN MEDS:acetaminophen, albuterol-ipratropium, ALPRAZolam, calcium gluconate IVPB, calcium gluconate IVPB, calcium gluconate IVPB, dextrose 50%, dextrose 50%, glucagon (human recombinant), glucose, glucose, labetalol, magnesium oxide, magnesium oxide, magnesium sulfate IVPB, magnesium sulfate IVPB, naloxone,  oxyCODONE-acetaminophen, polyethylene glycol, potassium bicarbonate, potassium bicarbonate, potassium bicarbonate, potassium chloride in water **AND** potassium chloride in water **AND** potassium chloride in water, prochlorperazine, simethicone, sodium chloride 0.9%, sodium chloride 0.9%, sodium phosphate IVPB, sodium phosphate IVPB, sodium phosphate IVPB    LABS AND DIAGNOSTICS     CBC LAST 3 DAYS  Recent Labs   Lab 06/19/22  0514 06/20/22  0229 06/21/22  0456   WBC 12.12 12.12 6.97   RBC 3.28* 3.40* 3.38*   HGB 9.2* 9.6* 9.4*   HCT 29.4* 30.2* 31.0*   MCV 90 89 92   MCH 28.0 28.2 27.8   MCHC 31.3* 31.8* 30.3*   RDW 15.9* 15.7* 14.8*    220 199   MPV 8.5* 8.6* 8.7*   GRAN 93.1*  11.3* 80.3*  9.7* 92.3*  6.4   LYMPH 4.3*  0.5* 13.8*  1.7 5.0*  0.4*   MONO 2.0*  0.2* 5.4  0.7 2.2*  0.2*   BASO 0.00 0.01 0.00   NRBC 0 0 0       COAGULATION LAST 3 DAYS  Recent Labs   Lab 06/19/22  0514   LABPT 12.7   INR 1.0       CHEMISTRY LAST 3 DAYS  Recent Labs   Lab 06/18/22  0524 06/19/22  0510 06/19/22  0514 06/19/22  1334 06/20/22  0229 06/20/22  0352 06/20/22  1207 06/20/22  1819 06/21/22  0456   NA  --   --  132*  --  133*  --   --   --  134*   K  --   --  4.0  --  3.3*  --  4.1  --  4.0   CL  --   --  87*  --  87*  --   --   --  89*   CO2  --   --  39*  --  41*  --   --   --  41*   ANIONGAP  --   --  6*  --  5*  --   --   --  4*   BUN  --   --  20  --  20  --   --   --  13   CREATININE  --   --  0.3*  --  0.3*  --   --   --  <0.3*   GLU  --   --  115*  --  88  --   --   --  117*   CALCIUM  --   --  8.2*  --  7.8*  --   --   --  7.9*   PH 7.530* 7.398  --   --   --  7.368  --   --   --    MG  --   --  1.8   < > 1.7  --  1.9 1.9 1.7   ALBUMIN  --   --  2.6*  --  2.6*  --   --   --  2.7*   PROT  --   --  5.5*  --  5.4*  --   --   --  5.5*   ALKPHOS  --   --  48*  --  45*  --   --   --  42*   ALT  --   --  25  --  22  --   --   --  18   AST  --   --  27  --  23  --   --   --  17   BILITOT  --   --  0.6  --  0.6   --   --   --  0.6    < > = values in this interval not displayed.       CARDIAC PROFILE LAST 3 DAYS  Recent Labs   Lab 06/17/22  1307 06/21/22  0456   BNP 60 277*   TROPONINI <0.030 <0.030       ENDOCRINE LAST 3 DAYS  Recent Labs   Lab 06/17/22  1307 06/20/22  0229   TSH  --  1.940   PROCAL 0.05  --        LAST ARTERIAL BLOOD GAS  ABG  Recent Labs   Lab 06/20/22  0352   PH 7.368   PO2 48   PCO2 80.6*   HCO3 46.4*   BE 21       LAST 7 DAYS MICROBIOLOGY   Microbiology Results (last 7 days)     Procedure Component Value Units Date/Time    Blood culture #1 **CANNOT BE ORDERED STAT** [[id number]] Collected: 06/17/22 1307    Order Status: Completed Specimen: Blood from Peripheral, Antecubital, Left Updated: 06/20/22 1432     Blood Culture, Routine No Growth to date      No Growth to date      No Growth to date      No Growth to date    Blood culture #2 **CANNOT BE ORDERED STAT** [[id number]] Collected: 06/17/22 1320    Order Status: Completed Specimen: Blood from Peripheral, Antecubital, Right Updated: 06/20/22 1432     Blood Culture, Routine No Growth to date      No Growth to date      No Growth to date      No Growth to date    Culture, Respiratory with Gram Stain [[id number]] Collected: 06/18/22 1535    Order Status: Completed Specimen: Respiratory from Tracheal Aspirate Updated: 06/20/22 0655     Respiratory Culture No normal respiratory mane      No growth     Gram Stain (Respiratory) Many WBC's     Gram Stain (Respiratory) <10 epithelial cells per low power field.     Gram Stain (Respiratory) No organisms seen    MRSA Screen by PCR [[id number]] Collected: 06/18/22 0121    Order Status: Completed Specimen: Nasopharyngeal Swab from Nasal Updated: 06/18/22 0358     MRSA SCREEN BY PCR Negative          MOST RECENT IMAGING  X-Ray Chest AP Portable  Portable chest x-ray at 11:47 AM is compared to prior study 6/20/2022    Clinical history PICC line placement    There is a left PICC line with the tip overlying the distal  superior vena cava. There is no pneumothorax. The right IJ catheter is in stable position. Cardiomediastinal silhouette is normal in size.    The lungs are hyperexpanded with diffuse emphysematous changes.    There is faint groundglass opacity within the right lower lobe the remainder of the lungs are clear.    IMPRESSION: No pneumothorax status post left PICC line placement    Hyperexpanded lungs compatible with COPD with faint groundglass opacity in the right lower lobe suggestive of pneumonia    Electronically signed by:  Laurel Arana MD  6/20/2022 12:12 PM CDT Workstation: [id number]  X-Ray KUB  KUB    Clinical history is abdominal distention    There is a normal bowel gas pattern.    There are surgical anastomotic sutures in the right midabdomen. No abnormal soft tissue mass or organomegaly. The visualized portion of the lung bases are clear. There are no acute osseous abnormalities.    IMPRESSION: Unremarkable bowel gas pattern    Electronically signed by:  Laurel Arana MD  6/20/2022 8:01 AM CDT Workstation: [id number]  X-Ray Chest AP Portable  Portable chest x-ray 5:17 AM is compared to prior study dated 6/19/2022    Clinical history is shortness of breath    Endotracheal tube has been removed. The right IJ catheter is in stable position.    Lungs are hyperexpanded compatible with COPD. There is near complete resolution of the right upper lobe reticulonodular infiltrate. There are no confluent infiltrates or pleural effusions. There are no acute osseous abnormalities.    IMPRESSION: Hyperexpanded lungs compatible with COPD with near complete resolution of the reticular nodular infiltrate right upper lobe    Electronically signed by:  Laurel Arana MD  6/20/2022 6:11 AM CDT Workstation: [id number]      ECHOCARDIOGRAM RESULTS (last 5)  No results found for this or any previous visit.    Summary    · The left ventricle is normal in size with moderate concentric hypertrophy and severely decreased  systolic function.  · Grade I left ventricular diastolic dysfunction.  · Moderate right ventricular enlargement with mildly reduced right ventricular systolic function.  · The estimated ejection fraction is 25%.  · Mechanically ventilated; cannot use inferior caval vein diameter to estimate central venous pressure.  · Mild tricuspid regurgitation.  · Trivial pericardial effusion.          CURRENT/PREVIOUS VISIT EKG  Results for orders placed or performed during the hospital encounter of 06/17/22   EKG 12-lead    Collection Time: 06/21/22  7:12 AM    Narrative    Test Reason : I47.2,    Vent. Rate : 086 BPM     Atrial Rate : 086 BPM     P-R Int : 144 ms          QRS Dur : 080 ms      QT Int : 378 ms       P-R-T Axes : 079 024 250 degrees     QTc Int : 452 ms    Normal sinus rhythm  Possible Left atrial enlargement  Septal infarct ,age undetermined  ST and Marked T wave abnormality, consider anterolateral ischemia  Abnormal ECG  When compared with ECG of 20-JUN-2022 05:35,  Premature ventricular complexes are no longer Present  Septal infarct is now Present    Referred By: [email or web address]   SELF           Confirmed By:            ASSESSMENT/PLAN:     Active Hospital Problems    Diagnosis    *COPD (chronic obstructive pulmonary disease)    Hypokalemia    Acute hypercapnic respiratory failure    Cardiomyopathy    QT prolongation    VT (ventricular tachycardia)    BALDEMAR (generalized anxiety disorder)    Severe malnutrition    Anemia    Hyponatremia    Acute on chronic respiratory failure requiring mechanical ventilation    VT/torsades cardiac arrest with ROSC     Patient coded in emergency department  Intubated  Admit to ICU  Consult to Critical Care      Right lung pneumonia, likely aspiration    Smoker    Abdominal pain    BMI less than 19,adult    Obstructive sleep apnea syndrome       ASSESSMENT & PLAN:     1. Cardiac arrest  2. Torsades/VT  3. Acute hypoxic respiratory failure  4. Malnutrition  5. Right  lung pneumonia  6. Smoker  7. COPD  8. DANIS  9. Hypomagnesemia   10. LV Dysfunction LVEF 25%  11. Hypokalemia-replaced  12. Partial colonic obstruction      RECOMMENDATIONS:    Replace Magnesium and Potassium  Keep Mag at least 2.2 and Potassium at least 4.0   Add amiodarone 200 mg PO BID  Continue Metoprolol  Add low doses of losartan 12.5 mg daily  Patient and her sister understand the increase risk she has for angiographic assessment.   Order life vest  Could consider stress testing.   Will treat medically for now  Continue ASA daily    Dr. Marquez had a conversation with patient and sister regarding plan and they verbalized understanding.          Lizzie Hernandez NP  [facility]  Department of Cardiology  Date of Service: 06/21/2022  2:44 PM

## 2022-06-21 NOTE — PLAN OF CARE
Problem: Parenteral Nutrition  Goal: Effective Intravenous Nutrition Therapy Delivery  Outcome: Ongoing, Progressing  Intervention: Optimize Intravenous Nutrition Delivery  Flowsheets (Taken 6/21/2022 4764)  Nutrition Support Management: (parenteral nutrition composition ordered) --

## 2022-06-21 NOTE — PT/OT/SLP PROGRESS
"Physical Therapy Treatment    Patient Name:  Estelle Cast   MRN:  [id number]    Recommendations:     Discharge Recommendations:  nursing facility, skilled ((dep on prog))   Discharge Equipment Recommendations: walker, rolling   Barriers to discharge: Inaccessible home    Assessment:     Estelle Cast is a 56 y.o. female admitted with a medical diagnosis of COPD (chronic obstructive pulmonary disease).  She presents with the following impairments/functional limitations:  weakness, impaired functional mobilty, impaired endurance, gait instability, impaired cardiopulmonary response to activity, impaired self care skills, impaired skin, edema, decreased lower extremity function, decreased upper extremity function . PT found on BSC. Agreeable to PT. Stands without assistance. Pt stood without support while PT and staff prepared O2 tank, monitors, etc for walking. Ambulated 50 ft and 150 ft with cardiac RW on 4 l NC, decreased alexa, steady. Maintained SpO2 %    Rehab Prognosis: Fair; patient would benefit from acute skilled PT services to address these deficits and reach maximum level of function.    Recent Surgery: * No surgery found *      Plan:     During this hospitalization, patient to be seen 6 x/week to address the identified rehab impairments via gait training, therapeutic activities, therapeutic exercises and progress toward the following goals:    · Plan of Care Expires:  07/20/22    Subjective     Chief Complaint: I'm tired" Pt has been OOB all day  Patient/Family Comments/goals: get better  Pain/Comfort:  · Pain Rating 1: 0/10  · Pain Rating Post-Intervention 1: 0/10      Objective:     Communicated with nurse prior to session.  Patient found seated on BSC with pulse ox (continuous), telemetry, peripheral IV, PICC line, eugene catheter, oxygen, blood pressure cuff upon PT entry to room.     General Precautions: Standard, fall   Orthopedic Precautions:N/A   Braces: N/A  Respiratory Status: " Nasal cannula, flow 4 L/min     Functional Mobility:  · Transfers:     · Sit to Stand:  modified independence with no AD  · Gait: Ambulated 50 ft and 150 ft with cardiac RW on 4 l NC, decreased alexa, steady. Maintained SpO2 %      AM-PAC 6 CLICK MOBILITY          Therapeutic Activities and Exercises:   educated on safety with walker for ambulation, pacing self, fall prevention    Patient left up in chair with all lines intact and call button in reach..    GOALS:   Multidisciplinary Problems     Physical Therapy Goals        Problem: Physical Therapy    Goal Priority Disciplines Outcome Goal Variances Interventions   Physical Therapy Goal     PT, PT/OT      Description: Goals to be met by: 2022     Patient will increase functional independence with mobility by performin. Supine to sit with Supervision  2. Sit to stand transfer with Supervision  3. Bed to chair transfer with Supervision using Rolling Walker  4. Gait  x 75 feet with Supervision using Rolling Walker.                          Time Tracking:     PT Received On: 22  PT Start Time: 1345     PT Stop Time: 1400  PT Total Time (min): 15 min     Billable Minutes: Therapeutic Activity 15    Treatment Type: Treatment  PT/PTA: PT           2022

## 2022-06-22 ENCOUNTER — TELEPHONE (OUTPATIENT)
Dept: PULMONOLOGY | Facility: CLINIC | Age: 57
End: 2022-06-22

## 2022-06-22 LAB
ALBUMIN SERPL BCP-MCNC: 2.6 G/DL (ref 3.5–5.2)
ALP SERPL-CCNC: 40 U/L (ref 55–135)
ALT SERPL W/O P-5'-P-CCNC: 16 U/L (ref 10–44)
ANION GAP SERPL CALC-SCNC: 3 MMOL/L (ref 8–16)
AST SERPL-CCNC: 14 U/L (ref 10–40)
BACTERIA BLD CULT: NORMAL
BACTERIA BLD CULT: NORMAL
BASOPHILS # BLD AUTO: 0.01 K/UL (ref 0–0.2)
BASOPHILS NFR BLD: 0.2 % (ref 0–1.9)
BILIRUB SERPL-MCNC: 0.4 MG/DL (ref 0.1–1)
BUN SERPL-MCNC: 14 MG/DL (ref 6–20)
CALCIUM SERPL-MCNC: 7.7 MG/DL (ref 8.7–10.5)
CHLORIDE SERPL-SCNC: 90 MMOL/L (ref 95–110)
CO2 SERPL-SCNC: 43 MMOL/L (ref 23–29)
CREAT SERPL-MCNC: <0.3 MG/DL (ref 0.5–1.4)
DIFFERENTIAL METHOD: ABNORMAL
EOSINOPHIL # BLD AUTO: 0.1 K/UL (ref 0–0.5)
EOSINOPHIL NFR BLD: 1.5 % (ref 0–8)
ERYTHROCYTE [DISTWIDTH] IN BLOOD BY AUTOMATED COUNT: 14.8 % (ref 11.5–14.5)
EST. GFR  (AFRICAN AMERICAN): >60 ML/MIN/1.73 M^2
EST. GFR  (NON AFRICAN AMERICAN): >60 ML/MIN/1.73 M^2
GLUCOSE SERPL-MCNC: 89 MG/DL (ref 70–110)
HCT VFR BLD AUTO: 30.5 % (ref 37–48.5)
HGB BLD-MCNC: 9 G/DL (ref 12–16)
IMM GRANULOCYTES # BLD AUTO: 0.02 K/UL (ref 0–0.04)
IMM GRANULOCYTES NFR BLD AUTO: 0.3 % (ref 0–0.5)
LYMPHOCYTES # BLD AUTO: 1.4 K/UL (ref 1–4.8)
LYMPHOCYTES NFR BLD: 23.2 % (ref 18–48)
MAGNESIUM SERPL-MCNC: 1.7 MG/DL (ref 1.6–2.6)
MCH RBC QN AUTO: 28.2 PG (ref 27–31)
MCHC RBC AUTO-ENTMCNC: 29.5 G/DL (ref 32–36)
MCV RBC AUTO: 96 FL (ref 82–98)
MONOCYTES # BLD AUTO: 0.7 K/UL (ref 0.3–1)
MONOCYTES NFR BLD: 10.7 % (ref 4–15)
NEUTROPHILS # BLD AUTO: 3.9 K/UL (ref 1.8–7.7)
NEUTROPHILS NFR BLD: 64.1 % (ref 38–73)
NRBC BLD-RTO: 0 /100 WBC
PHOSPHATE SERPL-MCNC: 2 MG/DL (ref 2.7–4.5)
PLATELET # BLD AUTO: 205 K/UL (ref 150–450)
PMV BLD AUTO: 8.8 FL (ref 9.2–12.9)
POTASSIUM SERPL-SCNC: 3.6 MMOL/L (ref 3.5–5.1)
PROT SERPL-MCNC: 5.1 G/DL (ref 6–8.4)
RBC # BLD AUTO: 3.19 M/UL (ref 4–5.4)
SODIUM SERPL-SCNC: 136 MMOL/L (ref 136–145)
WBC # BLD AUTO: 6.07 K/UL (ref 3.9–12.7)

## 2022-06-22 PROCEDURE — 25000003 PHARM REV CODE 250: Performed by: INTERNAL MEDICINE

## 2022-06-22 PROCEDURE — 25000003 PHARM REV CODE 250: Performed by: NURSE PRACTITIONER

## 2022-06-22 PROCEDURE — 25000003 PHARM REV CODE 250: Performed by: GENERAL PRACTICE

## 2022-06-22 PROCEDURE — 63600175 PHARM REV CODE 636 W HCPCS: Performed by: INTERNAL MEDICINE

## 2022-06-22 PROCEDURE — 25000242 PHARM REV CODE 250 ALT 637 W/ HCPCS: Performed by: FAMILY MEDICINE

## 2022-06-22 PROCEDURE — 97116 GAIT TRAINING THERAPY: CPT

## 2022-06-22 PROCEDURE — 80053 COMPREHEN METABOLIC PANEL: CPT | Performed by: INTERNAL MEDICINE

## 2022-06-22 PROCEDURE — 83735 ASSAY OF MAGNESIUM: CPT | Performed by: INTERNAL MEDICINE

## 2022-06-22 PROCEDURE — S4991 NICOTINE PATCH NONLEGEND: HCPCS | Performed by: INTERNAL MEDICINE

## 2022-06-22 PROCEDURE — 94799 UNLISTED PULMONARY SVC/PX: CPT

## 2022-06-22 PROCEDURE — 85025 COMPLETE CBC W/AUTO DIFF WBC: CPT | Performed by: INTERNAL MEDICINE

## 2022-06-22 PROCEDURE — 84100 ASSAY OF PHOSPHORUS: CPT | Performed by: INTERNAL MEDICINE

## 2022-06-22 PROCEDURE — 25000003 PHARM REV CODE 250: Performed by: FAMILY MEDICINE

## 2022-06-22 PROCEDURE — 99232 PR SUBSEQUENT HOSPITAL CARE,LEVL II: ICD-10-PCS | Mod: ,,, | Performed by: INTERNAL MEDICINE

## 2022-06-22 PROCEDURE — 27000221 HC OXYGEN, UP TO 24 HOURS

## 2022-06-22 PROCEDURE — 94761 N-INVAS EAR/PLS OXIMETRY MLT: CPT

## 2022-06-22 PROCEDURE — 94640 AIRWAY INHALATION TREATMENT: CPT

## 2022-06-22 PROCEDURE — 99900035 HC TECH TIME PER 15 MIN (STAT)

## 2022-06-22 PROCEDURE — 21000000 HC CCU ICU ROOM CHARGE

## 2022-06-22 PROCEDURE — 99233 PR SUBSEQUENT HOSPITAL CARE,LEVL III: ICD-10-PCS | Mod: ,,, | Performed by: INTERNAL MEDICINE

## 2022-06-22 PROCEDURE — 94660 CPAP INITIATION&MGMT: CPT

## 2022-06-22 PROCEDURE — 99233 SBSQ HOSP IP/OBS HIGH 50: CPT | Mod: ,,, | Performed by: INTERNAL MEDICINE

## 2022-06-22 PROCEDURE — 63600175 PHARM REV CODE 636 W HCPCS: Performed by: FAMILY MEDICINE

## 2022-06-22 PROCEDURE — 99232 SBSQ HOSP IP/OBS MODERATE 35: CPT | Mod: ,,, | Performed by: INTERNAL MEDICINE

## 2022-06-22 PROCEDURE — 99900031 HC PATIENT EDUCATION (STAT)

## 2022-06-22 RX ORDER — METOPROLOL TARTRATE 25 MG/1
25 TABLET, FILM COATED ORAL 3 TIMES DAILY
Status: DISCONTINUED | OUTPATIENT
Start: 2022-06-22 | End: 2022-06-25 | Stop reason: HOSPADM

## 2022-06-22 RX ADMIN — ALPRAZOLAM 0.25 MG: 0.25 TABLET ORAL at 01:06

## 2022-06-22 RX ADMIN — IPRATROPIUM BROMIDE AND ALBUTEROL SULFATE 3 ML: .5; 3 SOLUTION RESPIRATORY (INHALATION) at 07:06

## 2022-06-22 RX ADMIN — IPRATROPIUM BROMIDE AND ALBUTEROL SULFATE 3 ML: .5; 3 SOLUTION RESPIRATORY (INHALATION) at 11:06

## 2022-06-22 RX ADMIN — SIMETHICONE 80 MG: 80 TABLET, CHEWABLE ORAL at 04:06

## 2022-06-22 RX ADMIN — OXYCODONE HYDROCHLORIDE AND ACETAMINOPHEN 1 TABLET: 10; 325 TABLET ORAL at 05:06

## 2022-06-22 RX ADMIN — METOPROLOL TARTRATE 25 MG: 25 TABLET, FILM COATED ORAL at 08:06

## 2022-06-22 RX ADMIN — SIMETHICONE 80 MG: 80 TABLET, CHEWABLE ORAL at 07:06

## 2022-06-22 RX ADMIN — AMIODARONE HYDROCHLORIDE 200 MG: 200 TABLET ORAL at 08:06

## 2022-06-22 RX ADMIN — IPRATROPIUM BROMIDE AND ALBUTEROL SULFATE 3 ML: .5; 3 SOLUTION RESPIRATORY (INHALATION) at 03:06

## 2022-06-22 RX ADMIN — CHLORHEXIDINE GLUCONATE 15 ML: 1.2 RINSE ORAL at 08:06

## 2022-06-22 RX ADMIN — MEROPENEM AND SODIUM CHLORIDE 1 G: 1 INJECTION, SOLUTION INTRAVENOUS at 01:06

## 2022-06-22 RX ADMIN — LOSARTAN POTASSIUM 12.5 MG: 25 TABLET, FILM COATED ORAL at 08:06

## 2022-06-22 RX ADMIN — MUPIROCIN: 20 OINTMENT TOPICAL at 08:06

## 2022-06-22 RX ADMIN — ONDANSETRON 4 MG: 2 INJECTION INTRAMUSCULAR; INTRAVENOUS at 10:06

## 2022-06-22 RX ADMIN — Medication 400 MG: at 08:06

## 2022-06-22 RX ADMIN — MAGNESIUM SULFATE HEPTAHYDRATE 2 G: 40 INJECTION, SOLUTION INTRAVENOUS at 06:06

## 2022-06-22 RX ADMIN — ASPIRIN 81 MG CHEWABLE TABLET 81 MG: 81 TABLET CHEWABLE at 08:06

## 2022-06-22 RX ADMIN — MEROPENEM AND SODIUM CHLORIDE 1 G: 1 INJECTION, SOLUTION INTRAVENOUS at 04:06

## 2022-06-22 RX ADMIN — LATANOPROST 1 DROP: 50 SOLUTION OPHTHALMIC at 08:06

## 2022-06-22 RX ADMIN — PREDNISONE 30 MG: 5 TABLET ORAL at 08:06

## 2022-06-22 RX ADMIN — GUAIFENESIN 1200 MG: 600 TABLET, EXTENDED RELEASE ORAL at 08:06

## 2022-06-22 RX ADMIN — SIMETHICONE 80 MG: 80 TABLET, CHEWABLE ORAL at 05:06

## 2022-06-22 RX ADMIN — NICOTINE 1 PATCH: 7 PATCH, EXTENDED RELEASE TRANSDERMAL at 08:06

## 2022-06-22 RX ADMIN — MEROPENEM AND SODIUM CHLORIDE 1 G: 1 INJECTION, SOLUTION INTRAVENOUS at 08:06

## 2022-06-22 RX ADMIN — POTASSIUM CHLORIDE 40 MEQ: 29.8 INJECTION, SOLUTION INTRAVENOUS at 07:06

## 2022-06-22 RX ADMIN — ENOXAPARIN SODIUM 40 MG: 40 INJECTION SUBCUTANEOUS at 04:06

## 2022-06-22 RX ADMIN — SIMETHICONE 80 MG: 80 TABLET, CHEWABLE ORAL at 11:06

## 2022-06-22 RX ADMIN — OXYCODONE HYDROCHLORIDE AND ACETAMINOPHEN 1 TABLET: 10; 325 TABLET ORAL at 07:06

## 2022-06-22 RX ADMIN — SODIUM PHOSPHATE, MONOBASIC, MONOHYDRATE AND SODIUM PHOSPHATE, DIBASIC, ANHYDROUS 20.01 MMOL: 276; 142 INJECTION, SOLUTION INTRAVENOUS at 02:06

## 2022-06-22 RX ADMIN — IPRATROPIUM BROMIDE AND ALBUTEROL SULFATE 3 ML: .5; 3 SOLUTION RESPIRATORY (INHALATION) at 12:06

## 2022-06-22 NOTE — PLAN OF CARE
06/22/22 1442   Post-Acute Status   Post-Acute Authorization Hospice   Hospice Status Pending post acute provider review/more information requested  (Need clarification for LifeVest and need NIPPV settings for dc.)   Discharge Delays (!) Post-Acute Set-up   Discharge Plan   Discharge Plan A Hospice/home   Discharge Plan B Hospice/home     CM call to Dr Cano office left message with Octavio that Hospice needing to know about equipment and NIPPV settings.   Secure Message to Dr Leung for clarification of Life Vest and let know about the NIPPV setttings clarification that needed to be found out before can dc. Likely soonest would be tomorrow pending patient medically ready for dc.

## 2022-06-22 NOTE — NURSING
"      SICU PLAN OF CARE NOTE    Dx: COPD (chronic obstructive pulmonary disease)    Shift Events: NAEON    Goals of Care: MAP >65    Neuro: AAO x4, Follows Commands and Moves All Extremities    Vital Signs: BP 99/62 (BP Location: Left arm, Patient Position: Lying)   Pulse 81   Temp 97.9 °F (36.6 °C) (Axillary)   Resp 17   Ht 5' 4" (1.626 m)   Wt 47.6 kg (104 lb 15 oz)   SpO2 98%   Breastfeeding No   BMI 18.01 kg/m²     Respiratory: BiPAP/CPAP    Diet: Clear Liquid and TPN    Gtts: NONE    Urine Output: Urinary Catheter 600 cc/shift    Drains: NONE     Labs/Accuchecks: ROUTINE/NONE.         "

## 2022-06-22 NOTE — PLAN OF CARE
Problem: Parenteral Nutrition  Goal: Effective Intravenous Nutrition Therapy Delivery  Outcome: Ongoing, Progressing  Intervention: Optimize Intravenous Nutrition Delivery  Flowsheets (Taken 6/22/2022 1016)  Nutrition Support Management: (parenteral nutrition composition ordered) --

## 2022-06-22 NOTE — NURSING
Received patient from SICU via wheelchair. PT noted to have nasal cannula intact at 3 lpm. Wong in place draining clear yellow urine. No apparent distress. Agree with above assessment. Pt settled into room. Sitting up in chair with meal tray. Family at bedside

## 2022-06-22 NOTE — PT/OT/SLP PROGRESS
Physical Therapy Treatment    Patient Name:  Estelle Cast   MRN:  [id number]    Recommendations:     Discharge Recommendations:  home health PT, nursing facility, skilled   Discharge Equipment Recommendations: walker, rolling   Barriers to discharge: Inaccessible home    Assessment:     Estelle Cast is a 56 y.o. female admitted with a medical diagnosis of COPD (chronic obstructive pulmonary disease).  She presents with the following impairments/functional limitations:  weakness, impaired endurance, impaired self care skills, impaired functional mobilty, gait instability, impaired balance, impaired cardiopulmonary response to activity.    Pt presents up in chair, agreeable to gait training with PT. Pt ambulated in yost 160 ft with cardiac RW and min A to maintain balance. 4L O2 and IV pole in tow by 2nd person for safety. Pt left up in chair with family and case management present.    Rehab Prognosis: Fair; patient would benefit from acute skilled PT services to address these deficits and reach maximum level of function.    Recent Surgery: * No surgery found *      Plan:     During this hospitalization, patient to be seen 6 x/week to address the identified rehab impairments via gait training, therapeutic activities, therapeutic exercises and progress toward the following goals:    · Plan of Care Expires:  07/20/22    Subjective     Chief Complaint: tired  Patient/Family Comments/goals: to go home  Pain/Comfort:  · Pain Rating 1: 0/10      Objective:     Communicated with RN prior to session.  Patient found up in chair with pulse ox (continuous), telemetry, peripheral IV, PICC line, eugene catheter, oxygen, blood pressure cuff upon PT entry to room.     General Precautions: Standard, fall   Orthopedic Precautions:N/A   Braces: N/A  Respiratory Status: Nasal cannula, flow 4 L/min     Functional Mobility:  · Transfers:     · Sit to Stand:  contact guard assistance with cardiac RW  · Gait: 160 ft cardiac RW  and [place], 4L O2. Minimal shortness of breath, no LOB.  · Balance: [place]    Therapeutic Activities and Exercises:   Education, poc, dc planning, fall prevention. Pursed lip breathing during ambulation.    Patient left up in chair with all lines intact, call button in reach and CM and sister present.    GOALS:   Multidisciplinary Problems     Physical Therapy Goals        Problem: Physical Therapy    Goal Priority Disciplines Outcome Goal Variances Interventions   Physical Therapy Goal     PT, PT/OT      Description: Goals to be met by: 2022     Patient will increase functional independence with mobility by performin. Supine to sit with Supervision  2. Sit to stand transfer with Supervision  3. Bed to chair transfer with Supervision using Rolling Walker  4. Gait  x 75 feet with Supervision using Rolling Walker.                          Time Tracking:     PT Received On: 22  PT Start Time: 1115     PT Stop Time: 1125  PT Total Time (min): 10 min     Billable Minutes: Gait Training 10    Treatment Type: Treatment  PT/PTA: PT     PTA Visit Number: 0     2022

## 2022-06-22 NOTE — PLAN OF CARE
06/22/22 0724   Patient Assessment/Suction   Level of Consciousness (AVPU) alert   Respiratory Effort Unlabored   All Lung Fields Breath Sounds diminished   Cough Frequency no cough   PRE-TX-O2   O2 Device (Oxygen Therapy) BiPAP   $ Is the patient on Low Flow Oxygen? Yes   Oxygen Concentration (%) 35   SpO2 97 %   Pulse Oximetry Type Continuous   $ Pulse Oximetry - Multiple Charge Pulse Oximetry - Multiple   Pulse 82   Resp 20   Aerosol Therapy   $ Aerosol Therapy Charges Aerosol Treatment   Respiratory Treatment Status (SVN) given   Treatment Route (SVN) in-line   Patient Position (SVN) HOB elevated   Post Treatment Assessment (SVN) increased aeration   Signs of Intolerance (SVN) none   Breath Sounds Post-Respiratory Treatment   Post-treatment Heart Rate (beats/min) 83   Post-treatment Resp Rate (breaths/min) 20   Ready to Wean/Extubation Screen   FIO2<=50 (chart decimal) 0.35   Preset CPAP/BiPAP Settings   Mode Of Delivery BiPAP S/T   $ CPAP/BiPAP Daily Charge BiPAP/CPAP Daily   Size of Mask Small   Equipment Type V60   Ipap 14   EPAP (cm H2O) 8   Pressure Support (cm H2O) 6   ITime (sec) 1   Rise Time (sec) 3   Patient CPAP/BiPAP Settings   RR Total (Breaths/Min) 20   Tidal Volume (mL) 391   VE Minute Ventilation (L/min) 7.5 L/min   Peak Inspiratory Pressure (cm H2O) 14   Total Leak (L/Min) 14   CPAP/BiPAP Backup Settings   Backup Rate 20 breaths per minute (bpm)   CPAP/BiPAP Alarms   High Pressure (cm H2O) 40   Low Pressure (cm H2O) 10   Minute Ventilation (L/Min) 3   High RR (breaths/min) 40   Low RR (breaths/min) 10   Apnea (Sec) 20   Respiratory Evaluation   $ Care Plan Tech Time 15 min

## 2022-06-22 NOTE — PROGRESS NOTES
Progress Note  PULMONARY    Admit Date: 6/17/2022 06/22/2022  History of Present Illness:  Pt is a 55 yo female with COPD, malnutrition who presented to ED with resp distress and experienced code blue after arrival requiring 10min CPR, mag for torsades then had ROSC, intubated and was waking up and responsive. Pt admitted to ICU and pulmonary is consulted for further management/recommendations.  Further history is obtained over the phone with pt's sister and RAMIREZ Cassidy. She states pt was living in [place] where she had a pulmonologist caring for her, had very poor health with frequent admissions lately for COPD with CO2 retention (10 times so far this year) and moved to [place] to stay w/ sister 4 days ago. She developed acute on chronic abdominal pain and distention which made it difficult for her to breathe then had difficulty keeping sats up with home O2- uses 3L continuously. She was supposed to have NIV for home but had difficulty tolerating it and then difficulty getting it in [place]. Pt has had SBO requiring bowel resection 12/2021 and has had complications with abd pain, trouble eating off and on since then. She is noted to have SBO on this admission and surgery is consulted for further recommendations. Pt is a current smoker and wants to quit.       SUBJECTIVE:     6/19- pt waking up and writing notes to staff, continues on vent. cpap trial passed    6/20/2022 - Stable overnight but has remained on BiPAP.  In no distress.  She is anxious.  She shows very little insight into her overall health condition.  No new issues reported.  Had small BM yesterday but does feel that her abdomen is distended.  She is on Clinimix - to have PICC line placed (does have R CVL)    6/21/2022 - Stable overnight and has tolerated NC O2, using BiPAP when sleeping.  Tolerating liquids and is on TPN.  No new issues reported.  Remains weak and frail overall.  EKG noted this AM (cardiology following).    6/22/2022 - Stable  overnight, waiting on room availability to transfer out of ICU.  No new complaints.  She is considering possible DC with hospice but when I discussed code status she would want to be intubated and is undecided about whether she would want resuscitation.  We discussed this but about half way through the conversation she did not participate any more.  No new issues reported.      OBJECTIVE:     Vitals (Most recent):  Vitals:    06/22/22 0801   BP: 114/72   Pulse:    Resp:    Temp:        Vitals (24 hour range):  Temp:  [97.8 °F (36.6 °C)-98.5 °F (36.9 °C)]   Pulse:  []   Resp:  [14-46]   BP: ()/(55-88)   SpO2:  [85 %-100 %]       Intake/Output Summary (Last 24 hours) at 6/22/2022 0815  Last data filed at 6/22/2022 0630  Gross per 24 hour   Intake 1381.49 ml   Output 1700 ml   Net -318.51 ml          Physical Exam:  The patient's neuro status (alertness,orientation,cognitive function,motor skills,), pharyngeal exam (oral lesions, hygiene, abn dentition,), Neck (jvd,mass,thyroid,nodes in neck and above/below clavicle),RESPIRATORY(symmetry,effort,fremitus,percussion,auscultation),  Cor(rhythm,heart tones including gallops,perfusion,edema)ABD(distention,hepatic&splenomegaly,tenderness,masses), Skin(rash,cyanosis),Psyc(affect,judgement,).  Exam negative except for these pertinent findings:      Awake, alert, on BiPAP  Clear but diminished breath sounds, + Ansari's sign  Abdomen is soft, nontender, BS+ and more active  No edema  Thin, cachectic   Bruising bilat arms    Radiographs reviewed: view by direct vision   CXR 6/19- hyperinflated, no significant change    Labs     Recent Labs   Lab 06/22/22  0412   WBC 6.07   HGB 9.0*   HCT 30.5*        Recent Labs   Lab 06/22/22 0412      K 3.6   CL 90*   CO2 43*   BUN 14   CREATININE <0.3*   GLU 89   CALCIUM 7.7*   MG 1.7   PHOS 2.0*   AST 14   ALT 16   ALKPHOS 40*   BILITOT 0.4   PROT 5.1*   ALBUMIN 2.6*     No results for input(s): PH, PCO2, PO2, HCO3  in the last 24 hours.  Microbiology Results (last 7 days)     Procedure Component Value Units Date/Time    Blood culture #1 **CANNOT BE ORDERED STAT** [[id number]] Collected: 06/17/22 1307    Order Status: Completed Specimen: Blood from Peripheral, Antecubital, Left Updated: 06/21/22 1432     Blood Culture, Routine No Growth to date      No Growth to date      No Growth to date      No Growth to date      No Growth to date    Blood culture #2 **CANNOT BE ORDERED STAT** [[id number]] Collected: 06/17/22 1320    Order Status: Completed Specimen: Blood from Peripheral, Antecubital, Right Updated: 06/21/22 1432     Blood Culture, Routine No Growth to date      No Growth to date      No Growth to date      No Growth to date      No Growth to date    Culture, Respiratory with Gram Stain [[id number]] Collected: 06/18/22 1535    Order Status: Completed Specimen: Respiratory from Tracheal Aspirate Updated: 06/20/22 0655     Respiratory Culture No normal respiratory mane      No growth     Gram Stain (Respiratory) Many WBC's     Gram Stain (Respiratory) <10 epithelial cells per low power field.     Gram Stain (Respiratory) No organisms seen    MRSA Screen by PCR [[id number]] Collected: 06/18/22 0121    Order Status: Completed Specimen: Nasopharyngeal Swab from Nasal Updated: 06/18/22 0358     MRSA SCREEN BY PCR Negative          Impression:  Active Hospital Problems    Diagnosis  POA    *COPD (chronic obstructive pulmonary disease) [J44.9]  Yes     Chronic    Hypokalemia [E87.6]  No    Acute hypercapnic respiratory failure [J96.02]  Yes    Cardiomyopathy [I42.9]  Yes     Chronic    QT prolongation [R94.31]  Yes     Chronic    VT (ventricular tachycardia) [I47.2]  Clinically Undetermined    BALDEMAR (generalized anxiety disorder) [F41.1]  Yes     Chronic    Severe malnutrition [E43]  Yes     Chronic    Anemia [D64.9]  Yes     Chronic    Hyponatremia [E87.1]  No    Acute on chronic respiratory failure requiring mechanical  ventilation [J96.10]  Yes    VT/torsades cardiac arrest with ROSC [I46.9]  Yes     Patient coded in emergency department  Intubated  Admit to ICU  Consult to Critical Care      Right lung pneumonia, likely aspiration [J18.9]  Clinically Undetermined    Smoker [F17.200]  Yes    Abdominal pain [R10.9]  Yes    BMI less than 19,adult [Z68.1]  Not Applicable    Obstructive sleep apnea syndrome [G47.33]  Yes      Resolved Hospital Problems    Diagnosis Date Resolved POA    Possible bowel obstruction  [K56.609] 06/20/2022 Yes    Leucocytosis [D72.829] 06/19/2022 Yes               Plan:     - BiPAP as needed and when sleeping, O2 as able - her baseline paCO2 is probably 70-80  - caution with sedating meds and opiates given severe respiratory failure  - continue inhaled bronchodilators  - TPN for nutritional support and advance diet as able  - continue meropenem for pneumonia  - pepcid for GI prophylaxis  - lovenox for DVT prophylaxis  - further cardiac management per cardiology - note has been read  - increase activity as able  - continue with discussion about code status  - at DC will need NIPPV  - overall prognosis is very poor with severe lung disease, severe CM, severe malnutrition  - OK to transfer to floor when bed available          Delfino Cano MD  [facility] Pulmonary/Critical Care                                          .

## 2022-06-22 NOTE — TELEPHONE ENCOUNTER
- [facility] ext [phone] called an she said they need you to put in the notes what equipment she will need is it a nippv ? If so can you write what settings she will need when she discharges

## 2022-06-22 NOTE — PLAN OF CARE
22 1155   Post-Acute Status   Post-Acute Authorization Hospice   Hospice Status Referrals Sent   Discharge Delays None known at this time   Discharge Plan   Discharge Plan A Hospice/home   Discharge Plan B Hospice/home   CM received referral for Hospice. Went to meet patient at bedside. Verified name and . Sister Nam at bedside. Patient asked CM to speak to sister while she worked with PT.   CM discussed dcp with sister and per sister plan is Hospice and to stay locally. Patient did not want to go back to [place]. Once patient came back in room, CM discussed dcp with patient. Patient states that she does not want to go to a facility. She wants Hospice and whatever hospice that her sister wanted ([facility]) is what she would like. CM offered to see if can set up a call with Hospice and patient spouse or children. She does not. Only wants to speak with hospice with sister and no one else. Did discuss that had spoken about [facility] but sister unsure if patient still needs or not. CM will clarify with team.   CM sent referral to [facility] via [facility] after speaking with liaison Low.   Low in hospital and is going to speak with patient and sister at bedside. CM notified the bedside RN that Low going to see patient.     [address] Address for sister: [id number] [facility] [id number]

## 2022-06-22 NOTE — PROGRESS NOTES
[facility]  Adult Nutrition   Progress Note (Nutrition Support Management)    SUMMARY     Recommendations/Interventions:  1. New TPN to start @ 1700. TPN to run @ 70 mL/hr over 24 hours.   2. Hypophosphatemia (2.0), elevated CO2 (43)-continue to monitor and manage per MD.   3. Continue to advance diet as medically able.   4. RD to monitor diet progression, labs, plan of care, and make recommendations accordingly.  Goals: 1. Patient to tolerate TPN. 2. Diet to advance as medically able. 2. Electrolytes monitored and managed.  Nutrition Goal Status: progressing towards goal    Dietitian Rounds Brief:  · TPN to continue. New TPN ordered. CO2 increase with increase in dextrose composition of TPN. Adjusted TPN accordingly. Minimal intake continues-will monitor.    PARENTERAL NUTRITION PROGRESS NOTE:       Parenteral Nutrition Day # 3  Diagnosis/Indication for PN: SBO  IV Access: PICC  Diet/Tube Feeding: Adult Regular (IDDSI Level 7)         Admixture Type: Custome 3-in-1 TPN  Infusion Rate and Frequency: 70 mL/hr   Patient Acuity:Acute     PN Composition:   95 grams Amino Acid, 70 grams Dextrose, and 50 grams Lipid.    Today's TPN provides 1126 kcal.  *Dextrose is started at less than full dextrose goal to reduce risk for Refeeding Syndrome. Dextrose content will be advanced as appropriate over the next few days.     Please see order for electrolytes and additives content and adjustments.   *The Nutrition Support Team will continue to monitor electrolytes daily and adjust parenteral nutrition as warranted.    Reason for Assessment  Reason For Assessment: RD follow-up, new TPN  Diagnosis:  (Acute on chronic respiratory failure requiring mechanical ventilation)  Relevant Medical History: COPD, depression, generalized anxiety, DANIS, extensive smoking, severe malnutrition, anemia  Interdisciplinary Rounds: attended    Nutrition Risk Screen  Nutrition Risk Screen: tube feeding or parenteral  "nutrition    Nutrition/Diet History  Food Allergies:  (shellfish containing products)  Factors Affecting Nutritional Intake: altered gastrointestinal function, abdominal distension, abdominal pain    Anthropometrics  Temp: 97.9 °F (36.6 °C)  Height Method: Stated  Height: 5' 4" (162.6 cm)  Height (inches): 64 in  Weight Method: Bed Scale  Weight: 47.6 kg (104 lb 15 oz)  Weight (lb): 104.94 lb  Ideal Body Weight (IBW), Female: 120 lb  % Ideal Body Weight, Female (lb): 71.67 %  BMI (Calculated): 18  BMI Grade: 17 - 18.4 protein-energy malnutrition grade I     Weight History:  Wt Readings from Last 10 Encounters:   06/17/22 47.6 kg (104 lb 15 oz)   06/18/22 47.2 kg (104 lb)     Lab/Procedures/Meds: Pertinent Labs Reviewed  Clinical Chemistry:  Recent Labs   Lab 06/20/22  0229 06/21/22  0456 06/22/22  0412   * 134* 136   K 3.3* 4.0 3.6   CL 87* 89* 90*   CO2 41* 41* 43*   GLU 88 117* 89   BUN 20 13 14   CREATININE 0.3* <0.3* <0.3*   CALCIUM 7.8* 7.9* 7.7*   PROT 5.4* 5.5* 5.1*   ALBUMIN 2.6* 2.7* 2.6*   BILITOT 0.6 0.6 0.4   ALKPHOS 45* 42* 40*   AST 23 17 14   ALT 22 18 16   ANIONGAP 5* 4* 3*   ESTGFRAFRICA >60.0 >60.0 >60.0   EGFRNONAA >60.0 >60.0 >60.0   MG 1.7 1.7 1.7   PHOS 4.7* 2.6* 2.0*    < > = values in this interval not displayed.     CBC:   Recent Labs   Lab 06/22/22  0412   WBC 6.07   RBC 3.19*   HGB 9.0*   HCT 30.5*      MCV 96   MCH 28.2   MCHC 29.5*     Lipid Panel:  Recent Labs   Lab 06/21/22  0456   TRIG 105     Cardiac Profile:  Recent Labs   Lab 06/17/22  1307 06/21/22  0456   BNP 60 277*   TROPONINI <0.030 <0.030     Thyroid & Parathyroid:  Recent Labs   Lab 06/20/22  0229   TSH 1.940     Medications: Pertinent Medications reviewed  Scheduled Meds:   albuterol-ipratropium  3 mL Nebulization Q4H    amiodarone  200 mg Oral BID    aspirin  81 mg Oral Daily    chlorhexidine  15 mL Mouth/Throat BID    enoxaparin  40 mg Subcutaneous Daily    guaiFENesin  1,200 mg Oral Daily    " latanoprost  1 drop Both Eyes QHS    losartan  12.5 mg Oral Daily    magnesium oxide  400 mg Oral Daily    meropenem (MERREM) IVPB  1 g Intravenous Q8H    metoprolol tartrate  25 mg Oral BID    mupirocin   Nasal BID    nicotine  1 patch Transdermal Daily    predniSONE  30 mg Oral Daily     Continuous Infusions:   TPN ADULT CENTRAL LINE CUSTOM (3 in 1) 70 mL/hr at 06/21/22 2109     PRN Meds:.acetaminophen, albuterol-ipratropium, ALPRAZolam, calcium gluconate IVPB, calcium gluconate IVPB, calcium gluconate IVPB, dextrose 50%, dextrose 50%, glucagon (human recombinant), glucose, glucose, ipratropium, labetalol, magnesium oxide, magnesium oxide, magnesium sulfate IVPB, magnesium sulfate IVPB, naloxone, ondansetron, oxyCODONE-acetaminophen, polyethylene glycol, potassium bicarbonate, potassium bicarbonate, potassium bicarbonate, potassium chloride in water **AND** potassium chloride in water **AND** potassium chloride in water, prochlorperazine, simethicone, sodium chloride 0.9%, sodium chloride 0.9%, sodium phosphate IVPB, sodium phosphate IVPB, sodium phosphate IVPB    Antibiotics (From admission, onward)            Start     Stop Route Frequency Ordered    06/20/22 0945  mupirocin 2 % ointment  (MRSA Decolonization Orders [facility])         06/25 0859 Nasl 2 times daily 06/20/22 0839    06/18/22 0500  meropenem-0.9% sodium chloride 1 g/50 mL IVPB         -- IV Every 8 hours (non-standard times) 06/17/22 2317        Estimated/Assessed Needs  Weight Used For Calorie Calculations: 47.6 kg (104 lb 15 oz)  Energy Calorie Requirements (kcal): 1649-6595 (35-40kcal/kg once extubated)  Energy Need Method: Kcal/kg  Protein Requirements: 72-95 g/day (1.5-2.0 g/kg)  Weight Used For Protein Calculations: 47.6 kg (104 lb 15 oz)  Fluid Requirements (mL): 1 mL/kcal or per MD     RDA Method (mL): 1666     Nutrition Prescription Ordered    Current Diet Order: Adult Regular (IDDSI Level 7)  Current Nutrition Support Formula Ordered:  Other (Comment) (Custom 3-in-1 TPN)  Current Nutrition Support Rate Ordered: 70 (ml)  Current Nutrition Support Frequency Ordered: mL/hr; continuous    Evaluation of Received Nutrient/Fluid Intake    Parenteral Calories (kcal): 1186  Parenteral Protein (gm): 95  Parenteral Fluid (mL): 1680  Lipid Calories (kcals): 500 kcals  Energy Calories Required: not meeting needs  Protein Required: meeting needs  Fluid Required: meeting needs  Tolerance: tolerating  % Intake of Estimated Energy Needs: 50 - 75 %  % Meal Intake: 0 - 25 %    Intake/Output Summary (Last 24 hours) at 6/22/2022 0738  Last data filed at 6/22/2022 0630  Gross per 24 hour   Intake 1381.49 ml   Output 1700 ml   Net -318.51 ml      Nutrition Risk    Level of Risk/Frequency of Follow-up: high   Monitor and Evaluation    Food and Nutrient Intake: energy intake, food and beverage intake, parenteral nutrition intake  Food and Nutrient Adminstration: diet order, enteral and parenteral nutrition administration  Physical Activity and Function: nutrition-related ADLs and IADLs, factors affecting access to physical activity  Anthropometric Measurements: body mass index, weight change, weight  Biochemical Data, Medical Tests and Procedures: electrolyte and renal panel, gastrointestinal profile, glucose/endocrine profile, inflammatory profile, lipid profile  Nutrition-Focused Physical Findings: overall appearance     Nutrition Follow-Up    RD Follow-up?: Yes  Dee Wakefield RD 06/22/2022 9:59 AM

## 2022-06-22 NOTE — PROGRESS NOTES
Count includes the [facility] Medicine  Progress Note    Patient Name: Estelle Cast  MRN: [id number]  Patient Class: IP- Inpatient   Admission Date: 6/17/2022  Length of Stay: 5 days  Attending Physician: Ben Leung MD  Primary Care Provider: Primary Doctor No        Subjective:     Principal Problem:COPD (chronic obstructive pulmonary disease)        HPI:  56-year-old with past medical history significant for COPD, BMI 14.8, depression, generalized anxiety, DANIS, extensive smoking history presenting with worsening shortness breath.  At baseline she uses 3 L it was reported that her oxygen was in the low 80s.  Minimally productive cough.  Chronic diarrhea unchanged from baseline.  No fevers, chills, nausea, vomiting, sick contacts.  No chest pain.  Patient has history of chronic abdominal pain from bowel resection from severe sepsis episode in the past.  On presentation she had been complaining of some upper abdominal pain that was mild.  Family recently relocated from [place].    At time of my evaluation the patient had been intubated for worsening shortness of breath and acute respiratory decompensation with code.  Per ED physician verbal report, patient had gone into torsades and subsequently V-tach.  CPR, epinephrine, amiodarone, shock, and magnesium were given.  Patient responded and is now intubated.  Approximate time of code was less than 10 minutes.  She is currently on Levophed drip.  She is alert and able to give the thumbs-up sign.  My history is limited to report from emergency department.    ED lab significant for WBC 18, Na 134, CO2 48, creatinine 0.3, BNP 60.  Troponins negative.  Lactic acid negative.  Procalcitonin negative.    Chest x-ray significant for tiny interstitial opacity and right upper lobe and right lower lung base.  Lungs are hyperexpanded consistent with emphysema.    In the ED she received a dose of the azithromycin, ceftriaxone, and later meropenem after she was  intubated.  She is also received a dose of Solu-Medrol and DuoNeb treatments.        Overview/Hospital Course:  Assumed care of this patient on 6/18/22.  Patient recently relocated to [place] to live with sister from [place].  Known history of COPD, chronic respiratory failure on 3 L home oxygen, continued tobacco use.  States in December needed emergent surgery, was taken to [facility], had abdominal resection, since then she is followed by surgery and Gastroenterology, chronic abdominal pain, intermittent worsening, loose stools and as per report seems she was placed on b.i.d. Questran.  Initially presented to the ED due to worsening abdominal pain.  Subsequently with worsening respiratory status, cough productive of greenish phlegm, cardiopulmonary arrest (reported torsades with VT after azithromycin and EKG with QT prolongation), status post ROSC, intubated and transferred to the ICU.  Chest x-ray with concern for right lung pneumonia, likely aspiration.  CT abdomen with concern for large stool burden large bowel and concern for partial colonic obstruction.  Patient is severely malnourished.  On 06/18 remains orally intubated but awake, alert, communicating via writing, copious amounts of phlegm production, denies any abdominal pain, states she is not passing flatus, no bowel movements today.  On 06/19, EKG with continued QTC prolongation, echo now with EF of 25% with grade 1 diastolic dysfunction, started on metoprolol, cardiology following.  Plan is attempted extubation to BiPAP.  PICC line when able to start TPN.    6/21/2022  Ms Cast is sitting up and feeding herself in a chair. She notes improved SOB but continued GONZALEZ. I want to have s decent life with the time I have left.     6/22/2022  Pt states that she is feeling better than yesterday with reduced sob, gonzalez and orthopnea. She has decided to be a hospice patient at home.      Interval History: Ms Cast has reduced respiratory symptoms  but has decided to go home with hospice    Review of Systems   Constitutional:  Positive for diaphoresis and fatigue.   HENT: Negative.     Eyes: Negative.    Respiratory:  Positive for cough, chest tightness and shortness of breath.         Hawkins  All reduced   Cardiovascular:  Positive for chest pain.   Gastrointestinal: Negative.    Endocrine: Positive for heat intolerance.   Genitourinary: Negative.    Musculoskeletal:  Positive for arthralgias, gait problem and myalgias.   Skin: Negative.    Allergic/Immunologic: Negative.    Neurological:  Positive for weakness.   Hematological:  Bruises/bleeds easily.   Psychiatric/Behavioral:  Positive for dysphoric mood. The patient is nervous/anxious.    Objective:     Vital Signs (Most Recent):  Temp: 97.8 °F (36.6 °C) (06/22/22 0730)  Pulse: 86 (06/22/22 0900)  Resp: 17 (06/22/22 0900)  BP: 121/75 (06/22/22 0900)  SpO2: (!) 92 % (06/22/22 0900)   Vital Signs (24h Range):  Temp:  [97.8 °F (36.6 °C)-98.5 °F (36.9 °C)] 97.8 °F (36.6 °C)  Pulse:  [] 86  Resp:  [14-46] 17  SpO2:  [88 %-100 %] 92 %  BP: ()/(55-88) 121/75     Weight: 47.6 kg (104 lb 15 oz)  Body mass index is 18.01 kg/m².    Intake/Output Summary (Last 24 hours) at 6/22/2022 1039  Last data filed at 6/22/2022 0630  Gross per 24 hour   Intake 1381.49 ml   Output 1700 ml   Net -318.51 ml      Physical Exam  Vitals and nursing note reviewed.   Constitutional:       General: She is not in acute distress.     Appearance: She is ill-appearing. She is not diaphoretic.   HENT:      Head: Atraumatic.      Comments: Temporal wasting     Nose: Nose normal.      Mouth/Throat:      Mouth: Mucous membranes are moist.   Eyes:      Extraocular Movements: Extraocular movements intact.      Pupils: Pupils are equal, round, and reactive to light.   Neck:      Comments: No use of accessory muscles at rest  Cardiovascular:      Rate and Rhythm: Normal rate and regular rhythm.      Heart sounds:     Gallop present.    Pulmonary:      Effort: Pulmonary effort is normal.      Comments: Diminished breath sounds  Abdominal:      General: Bowel sounds are normal. There is no distension.      Tenderness: There is no abdominal tenderness. There is no guarding or rebound.   Musculoskeletal:         General: Normal range of motion.      Cervical back: Normal range of motion and neck supple.   Skin:     General: Skin is warm.   Neurological:      Mental Status: She is alert and oriented to person, place, and time.   Psychiatric:      Comments: Mildly dysphoric mood       Significant Labs: All pertinent labs within the past 24 hours have been reviewed.  Recent Lab Results         06/22/22  0412        Albumin 2.6       Alkaline Phosphatase 40       ALT 16       Anion Gap 3       AST 14       Baso # 0.01       Basophil % 0.2       BILIRUBIN TOTAL 0.4  Comment: For infants and newborns, interpretation of results should be based  on gestational age, weight and in agreement with clinical  observations.    Premature Infant recommended reference ranges:  Up to 24 hours.............<8.0 mg/dL  Up to 48 hours............<12.0 mg/dL  3-5 days..................<15.0 mg/dL  6-29 days.................<15.0 mg/dL         BUN 14       Calcium 7.7       Chloride 90       CO2 43  Comment: co2 critical result(s) repeated. Called and verbal readback obtained   from more clay rn sicu by WES 06/22/2022 05:46         Creatinine <0.3       Differential Method Automated       eGFR if  >60.0       eGFR if non  >60.0  Comment: Calculation used to obtain the estimated glomerular filtration  rate (eGFR) is the CKD-EPI equation.          Eos # 0.1       Eosinophil % 1.5       Glucose 89       Gran # (ANC) 3.9       Gran % 64.1       Hematocrit 30.5       Hemoglobin 9.0       Immature Grans (Abs) 0.02  Comment: Mild elevation in immature granulocytes is non specific and   can be seen in a variety of conditions including stress  response,   acute inflammation, trauma and pregnancy. Correlation with other   laboratory and clinical findings is essential.         Immature Granulocytes 0.3       Lymph # 1.4       Lymph % 23.2       Magnesium 1.7       MCH 28.2       MCHC 29.5       MCV 96       Mono # 0.7       Mono % 10.7       MPV 8.8       nRBC 0       Phosphorus 2.0       Platelets 205       Potassium 3.6       PROTEIN TOTAL 5.1       RBC 3.19       RDW 14.8       Sodium 136       WBC 6.07               Significant Imaging: I have reviewed all pertinent imaging results/findings within the past 24 hours.      Assessment/Plan:      * COPD (chronic obstructive pulmonary disease)  Respiratory status remains very tenuous and at high risk for re-intubation  Continue scheduled DuoNebs, increase IV steroids 40 q.6  Suspect severe COPD    6/21/2022  Ms Cast notes reduced SOB but continued LAWSON  Her overall prognosis is very poor  I believe that she is stable for transfer to cardio A  Pulmonology input appreciated  Case discussed with Pt and sister  Consider SNF placement    Hypokalemia  Potassium 3.3, replaced as per sliding scale to keep greater than 4    QT prolongation  With torsades and VT in the ED  Avoid multiple QTC prolonging medications, checking EKG daily-QTC this morning 495  Keep potassium greater than 4, magnesium greater than 2      Cardiomyopathy  Echo this admission with EF of 25% with grade 1 diastolic dysfunction  Denies any previous known history of heart disease  On 06/18 started on metoprolol, adjust goal-directed therapy as able-with up trending blood pressure will discussed with Cardiology ACE versus Entresto  Will need left heart catheterization/further evaluation as per Cardiology  Monitor for signs of volume overload  Appreciate cardiology input      Hyponatremia  Optimizing nutrition as able and following      Anemia  Chronic, no evidence of active bleeding, monitoring      Severe malnutrition  Chronic issue.  There was  concern for bowel obstruction, surgery following, KUB this morning with nonobstructive bowel gas pattern  Central line in place, start TPN, consult for PICC line    BALDEMAR (generalized anxiety disorder)  Continue p.r.n. benzodiazepine and monitoring      VT (ventricular tachycardia)  QTC prolongation with torsades and ventricular tachycardia.  No further episodes.  Started on metoprolol.  Continuous cardiac monitoring      VT/torsades cardiac arrest with ROSC  Patient had ROSC, see QT and cardiomyopathy        Smoker  Nicotine patch in place.  Continue to reinforce smoking cessation    Right lung pneumonia, likely aspiration  Respiratory sample with no pathogens but radiological evidence of right lung pneumonia and suspect aspiration  Continue meropenem, adjust as needed      Obstructive sleep apnea syndrome  Has had multiple admissions for hypercapnic respiratory failure as per report  Reportedly has NIP VV at home  Continue BiPAP      Acute on chronic respiratory failure requiring mechanical ventilation  Intubated in the ED. On 06/19 extubated to BiPAP  P.r.n. ABG and chest x-ray  Pulmonary following    Abdominal pain  Acute on chronic  CT is concerning for bowel obstruction with large stool burden, seen by General surgery and given enema on 06/19  KUB 6/20 nonobstructive bowel gas pattern  Trial of clear liquid diet and serial abdominal examination        BMI less than 19,adult  See malnutrition      VTE Risk Mitigation (From admission, onward)         Ordered     enoxaparin injection 40 mg  Daily         06/17/22 2317     Place sequential compression device  Until discontinued         06/17/22 2317     IP VTE HIGH RISK PATIENT  Once         06/17/22 2317     Reason for no Mechanical VTE Prophylaxis  Once        Question:  Reasons:  Answer:  Physician Provided (leave comment)    06/17/22 2317                Discharge Planning   LIZANDRO: 6/23/2022     Code Status: Full Code   Is the patient medically ready for  discharge?:     Reason for patient still in hospital (select all that apply): Treatment, Consult recommendations and Pending disposition  Discharge Plan A: Skilled Nursing Facility   Discharge Delays: (!) Post-Acute Set-up              Ben Leung MD  Department of Hospital Medicine   [facility]

## 2022-06-22 NOTE — PROGRESS NOTES
[facility]  Department of Cardiology  Progress Note      PATIENT NAME: Estelle Cast    MRN: [id number]  TODAY'S DATE: 06/22/2022  ADMIT DATE: 6/17/2022                          CONSULT REQUESTED BY: Ben Leung MD    SUBJECTIVE     PRINCIPAL PROBLEM: COPD (chronic obstructive pulmonary disease)      6/22/2022      Patient sitting up in the chair very frail. Denies any symptoms currently.     6/21/2022    Patient sitting up in the chair in no acute distress. Vital signs stable. No arythmias overnight.   Sister at bedside.       6/20/2022    Patient is off the ventilator. She denies chest pain. She has a BMI 17.85. K is 3.3. and Magnesium is 1.7.      6/19/22:  Patient seen resting in bed on CPAP/BiPAP.  Her sister is at bedside and is currently going through stacks of records.  According to the patient she has not had a cardiac angiogram in the past but has had a workup.  Currently she is in sinus rhythm with occasional PVCs which have improved.    REASON FOR CONSULT:    From H&P: 56-year-old with past medical history significant for COPD, BMI 14.8, depression, generalized anxiety, DANIS, extensive smoking history presenting with worsening shortness breath.  At baseline she uses 3 L it was reported that her oxygen was in the low 80s.  Minimally productive cough.  Chronic diarrhea unchanged from baseline.  No fevers, chills, nausea, vomiting, sick contacts.  No chest pain.  Patient has history of chronic abdominal pain from bowel resection from severe sepsis episode in the past.  On presentation she had been complaining of some upper abdominal pain that was mild.  Family recently relocated from [place].     At time of my evaluation the patient had been intubated for worsening shortness of breath and acute respiratory decompensation with code.  Per ED physician verbal report, patient had gone into torsades and subsequently V-tach.  CPR, epinephrine, amiodarone, shock, and magnesium were given.   Patient responded and is now intubated.  Approximate time of code was less than 10 minutes.  She is currently on Levophed drip.  She is alert and able to give the thumbs-up sign.  My history is limited to report from emergency department.     ED lab significant for WBC 18, Na 134, CO2 48, creatinine 0.3, BNP 60.  Troponins negative.  Lactic acid negative.  Procalcitonin negative.     Chest x-ray significant for tiny interstitial opacity and right upper lobe and right lower lung base.  Lungs are hyperexpanded consistent with emphysema.     In the ED she received a dose of the azithromycin, ceftriaxone, and later meropenem after she was intubated.  She is also received a dose of Solu-Medrol and DuoNeb treatments.           No new subjective & objective note has been filed under this hospital service since the last note was generated.      HPI:    Patient is a 56-year-old female who presented to the emergency room with worsening shortness of breath.  Patient was noted to be hypoxic with O2 sat in the 80s.  In the emergency room the patient went into torsades and subsequently ventricular tachycardia according to H&P.  Patient was coded with ACLS protocol and achieved Rosc.  She was intubated and put in the ICU at which time she was placed on a Levophed drip.    Patient's niece was present at bedside this morning, but she was not aware of the patient's medical history other than that she had been having some abdominal pain at her home in [place] and decided to come over and stay with her sister.      Review of patient's allergies indicates:   Allergen Reactions    Celexa [citalopram]     Breztri aerosphere [budesonide-glycopyr-formoterol]     Pcn [penicillins]     Shellfish containing products     Advair diskus [fluticasone propion-salmeterol] Rash    Azithromycin Rash    Buspirone (bulk) Rash    Chantix [varenicline] Nausea And Vomiting       Past Medical History:   Diagnosis Date    Anemia, unspecified      Asthma     Chronic respiratory failure with hypoxia     COPD (chronic obstructive pulmonary disease)     Dysuria     Hypokalemia     Hypomagnesemia     Hyponatremia     Malnutrition     Sepsis, unspecified organism      Past Surgical History:   Procedure Laterality Date    ABDOMINAL SURGERY      CHOLECYSTECTOMY      COLECTOMY      12/2021    HYSTERECTOMY       Social History     Tobacco Use    Smoking status: Current Every Day Smoker     Packs/day: 0.50    Smokeless tobacco: Never Used   Substance Use Topics    Alcohol use: Not Currently    Drug use: Yes     Types: Marijuana     Comment: x 5 days ago        REVIEW OF SYSTEMS      OBJECTIVE     VITAL SIGNS (Most Recent)  Temp: 97.8 °F (36.6 °C) (06/22/22 0730)  Pulse: 79 (06/22/22 1150)  Resp: 20 (06/22/22 1150)  BP: 111/73 (06/22/22 1100)  SpO2: 96 % (06/22/22 1150)    VENTILATION STATUS  Resp: 20 (06/22/22 1150)  SpO2: 96 % (06/22/22 1150)  Oxygen Concentration (%):  [35] 35    I & O (Last 24H):    Intake/Output Summary (Last 24 hours) at 6/22/2022 1414  Last data filed at 6/22/2022 0630  Gross per 24 hour   Intake 1381.49 ml   Output 1700 ml   Net -318.51 ml       WEIGHTS  Wt Readings from Last 1 Encounters:   06/17/22 2245 47.6 kg (104 lb 15 oz)   06/17/22 1100 39 kg (86 lb)       PHYSICAL EXAM  CONSTITUTIONAL: No fever, no chills; very thin female in no acute distress lying in bed  HEENT: Normocephalic, atraumatic,pupils reactive to light                 NECK:  No JVD no carotid bruit  CVS: S1S2+, RRR  LUNGS: Diminished  ABDOMEN: Soft, NT, BS+  EXTREMITIES: No cyanosis, edema  : + eugene catheter  NEURO: AAO X 3      HOME MEDICATIONS:  No current facility-administered medications on file prior to encounter.     Current Outpatient Medications on File Prior to Encounter   Medication Sig Dispense Refill    acetaminophen (TYLENOL) 500 MG tablet Take 500 mg by mouth every 6 (six) hours as needed for Pain.      albuterol (ACCUNEB) 0.63 mg/3 mL Nebu  Take 0.63 mg by nebulization every 6 (six) hours as needed. Rescue      ALPRAZolam (XANAX) 0.25 MG tablet Take by mouth 3 (three) times daily.      azelastine (ASTELIN) 137 mcg (0.1 %) nasal spray 1 spray by Nasal route 2 (two) times daily.      azithromycin (Z-CAMI) 250 MG tablet Take 500 mg by mouth once daily.      fluticasone furoate (ARNUITY ELLIPTA) 200 mcg/actuation inhaler Inhale 200 mcg into the lungs. Controller      guaiFENesin (MUCINEX) 600 mg 12 hr tablet Take 1,200 mg by mouth once daily.      ipratropium (ATROVENT HFA) 17 mcg/actuation inhaler Inhale 2 puffs into the lungs every 6 (six) hours. Rescue      ipratropium/albuterol sulfate (DUONEB INHL) Inhale into the lungs.      latanoprost, PF, 0.005 % Drop Apply to eye.      neomycin-polymyxin-hydrocortisone (CORTISPORIN) 3.5-10,000-10 mg-unit-mg/mL ophthalmic suspension 1 drop every 4 (four) hours.      nicotine (NICODERM CQ) 14 mg/24 hr Place 1 patch onto the skin every 24 hours.      pantoprazole (PROTONIX) 40 MG tablet Take 40 mg by mouth once daily.         SCHEDULED MEDS:   albuterol-ipratropium  3 mL Nebulization Q4H    amiodarone  200 mg Oral BID    aspirin  81 mg Oral Daily    chlorhexidine  15 mL Mouth/Throat BID    enoxaparin  40 mg Subcutaneous Daily    guaiFENesin  1,200 mg Oral Daily    latanoprost  1 drop Both Eyes QHS    losartan  12.5 mg Oral Daily    magnesium oxide  400 mg Oral Daily    meropenem (MERREM) IVPB  1 g Intravenous Q8H    metoprolol tartrate  25 mg Oral BID    mupirocin   Nasal BID    nicotine  1 patch Transdermal Daily    predniSONE  30 mg Oral Daily       CONTINUOUS INFUSIONS:   TPN ADULT CENTRAL LINE CUSTOM (3 in 1) 70 mL/hr at 06/21/22 2109    TPN ADULT CENTRAL LINE CUSTOM (3 in 1)         PRN MEDS:acetaminophen, albuterol-ipratropium, ALPRAZolam, calcium gluconate IVPB, calcium gluconate IVPB, calcium gluconate IVPB, dextrose 50%, dextrose 50%, glucagon (human recombinant), glucose, glucose,  ipratropium, labetalol, magnesium oxide, magnesium oxide, magnesium sulfate IVPB, magnesium sulfate IVPB, naloxone, ondansetron, oxyCODONE-acetaminophen, polyethylene glycol, potassium bicarbonate, potassium bicarbonate, potassium bicarbonate, potassium chloride in water **AND** potassium chloride in water **AND** potassium chloride in water, prochlorperazine, simethicone, sodium chloride 0.9%, sodium chloride 0.9%, sodium phosphate IVPB, sodium phosphate IVPB, sodium phosphate IVPB    LABS AND DIAGNOSTICS     CBC LAST 3 DAYS  Recent Labs   Lab 06/20/22  0229 06/21/22  0456 06/22/22 0412   WBC 12.12 6.97 6.07   RBC 3.40* 3.38* 3.19*   HGB 9.6* 9.4* 9.0*   HCT 30.2* 31.0* 30.5*   MCV 89 92 96   MCH 28.2 27.8 28.2   MCHC 31.8* 30.3* 29.5*   RDW 15.7* 14.8* 14.8*    199 205   MPV 8.6* 8.7* 8.8*   GRAN 80.3*  9.7* 92.3*  6.4 64.1  3.9   LYMPH 13.8*  1.7 5.0*  0.4* 23.2  1.4   MONO 5.4  0.7 2.2*  0.2* 10.7  0.7   BASO 0.01 0.00 0.01   NRBC 0 0 0       COAGULATION LAST 3 DAYS  Recent Labs   Lab 06/19/22  0514   LABPT 12.7   INR 1.0       CHEMISTRY LAST 3 DAYS  Recent Labs   Lab 06/18/22  0524 06/19/22  0510 06/19/22  0514 06/20/22  0229 06/20/22  0352 06/20/22  1207 06/20/22  1819 06/21/22  0456 06/22/22 0412   NA  --   --    < > 133*  --   --   --  134* 136   K  --   --    < > 3.3*  --  4.1  --  4.0 3.6   CL  --   --    < > 87*  --   --   --  89* 90*   CO2  --   --    < > 41*  --   --   --  41* 43*   ANIONGAP  --   --    < > 5*  --   --   --  4* 3*   BUN  --   --    < > 20  --   --   --  13 14   CREATININE  --   --    < > 0.3*  --   --   --  <0.3* <0.3*   GLU  --   --    < > 88  --   --   --  117* 89   CALCIUM  --   --    < > 7.8*  --   --   --  7.9* 7.7*   PH 7.530* 7.398  --   --  7.368  --   --   --   --    MG  --   --    < > 1.7  --  1.9 1.9 1.7 1.7   ALBUMIN  --   --    < > 2.6*  --   --   --  2.7* 2.6*   PROT  --   --    < > 5.4*  --   --   --  5.5* 5.1*   ALKPHOS  --   --    < > 45*  --   --   --   42* 40*   ALT  --   --    < > 22  --   --   --  18 16   AST  --   --    < > 23  --   --   --  17 14   BILITOT  --   --    < > 0.6  --   --   --  0.6 0.4    < > = values in this interval not displayed.       CARDIAC PROFILE LAST 3 DAYS  Recent Labs   Lab 06/17/22  1307 06/21/22  0456   BNP 60 277*   TROPONINI <0.030 <0.030       ENDOCRINE LAST 3 DAYS  Recent Labs   Lab 06/17/22  1307 06/20/22  0229   TSH  --  1.940   PROCAL 0.05  --        LAST ARTERIAL BLOOD GAS  ABG  Recent Labs   Lab 06/20/22  0352   PH 7.368   PO2 48   PCO2 80.6*   HCO3 46.4*   BE 21       LAST 7 DAYS MICROBIOLOGY   Microbiology Results (last 7 days)     Procedure Component Value Units Date/Time    Blood culture #1 **CANNOT BE ORDERED STAT** [[id number]] Collected: 06/17/22 1307    Order Status: Completed Specimen: Blood from Peripheral, Antecubital, Left Updated: 06/21/22 1432     Blood Culture, Routine No Growth to date      No Growth to date      No Growth to date      No Growth to date      No Growth to date    Blood culture #2 **CANNOT BE ORDERED STAT** [[id number]] Collected: 06/17/22 1320    Order Status: Completed Specimen: Blood from Peripheral, Antecubital, Right Updated: 06/21/22 1432     Blood Culture, Routine No Growth to date      No Growth to date      No Growth to date      No Growth to date      No Growth to date    Culture, Respiratory with Gram Stain [[id number]] Collected: 06/18/22 1535    Order Status: Completed Specimen: Respiratory from Tracheal Aspirate Updated: 06/20/22 0655     Respiratory Culture No normal respiratory mane      No growth     Gram Stain (Respiratory) Many WBC's     Gram Stain (Respiratory) <10 epithelial cells per low power field.     Gram Stain (Respiratory) No organisms seen    MRSA Screen by PCR [[id number]] Collected: 06/18/22 0121    Order Status: Completed Specimen: Nasopharyngeal Swab from Nasal Updated: 06/18/22 0358     MRSA SCREEN BY PCR Negative          MOST RECENT IMAGING  X-Ray Chest AP  Portable  Portable chest x-ray at 11:47 AM is compared to prior study 6/20/2022    Clinical history PICC line placement    There is a left PICC line with the tip overlying the distal superior vena cava. There is no pneumothorax. The right IJ catheter is in stable position. Cardiomediastinal silhouette is normal in size.    The lungs are hyperexpanded with diffuse emphysematous changes.    There is faint groundglass opacity within the right lower lobe the remainder of the lungs are clear.    IMPRESSION: No pneumothorax status post left PICC line placement    Hyperexpanded lungs compatible with COPD with faint groundglass opacity in the right lower lobe suggestive of pneumonia    Electronically signed by:  Laurel Arana MD  6/20/2022 12:12 PM CDT Workstation: [id number]  X-Ray KUB  KUB    Clinical history is abdominal distention    There is a normal bowel gas pattern.    There are surgical anastomotic sutures in the right midabdomen. No abnormal soft tissue mass or organomegaly. The visualized portion of the lung bases are clear. There are no acute osseous abnormalities.    IMPRESSION: Unremarkable bowel gas pattern    Electronically signed by:  Laurel Arana MD  6/20/2022 8:01 AM CDT Workstation: [id number]  X-Ray Chest AP Portable  Portable chest x-ray 5:17 AM is compared to prior study dated 6/19/2022    Clinical history is shortness of breath    Endotracheal tube has been removed. The right IJ catheter is in stable position.    Lungs are hyperexpanded compatible with COPD. There is near complete resolution of the right upper lobe reticulonodular infiltrate. There are no confluent infiltrates or pleural effusions. There are no acute osseous abnormalities.    IMPRESSION: Hyperexpanded lungs compatible with COPD with near complete resolution of the reticular nodular infiltrate right upper lobe    Electronically signed by:  Laurel Arana MD  6/20/2022 6:11 AM CDT Workstation:  [id number]      ECHOCARDIOGRAM RESULTS (last 5)  No results found for this or any previous visit.    Summary    · The left ventricle is normal in size with moderate concentric hypertrophy and severely decreased systolic function.  · Grade I left ventricular diastolic dysfunction.  · Moderate right ventricular enlargement with mildly reduced right ventricular systolic function.  · The estimated ejection fraction is 25%.  · Mechanically ventilated; cannot use inferior caval vein diameter to estimate central venous pressure.  · Mild tricuspid regurgitation.  · Trivial pericardial effusion.          CURRENT/PREVIOUS VISIT EKG  Results for orders placed or performed during the hospital encounter of 06/17/22   EKG 12-lead    Collection Time: 06/21/22  7:12 AM    Narrative    Test Reason : I47.2,    Vent. Rate : 086 BPM     Atrial Rate : 086 BPM     P-R Int : 144 ms          QRS Dur : 080 ms      QT Int : 378 ms       P-R-T Axes : 079 024 250 degrees     QTc Int : 452 ms    Normal sinus rhythm  Possible Left atrial enlargement  Septal infarct ,age undetermined  ST and Marked T wave abnormality, consider anterolateral ischemia  Abnormal ECG  When compared with ECG of 20-JUN-2022 05:35,  Premature ventricular complexes are no longer Present  Septal infarct is now Present  Confirmed by Jose Roberto BOWEN, Isaak FARFAN ([id number]) on 6/21/2022 4:41:03 PM    Referred By: [email or web address]   SELF           Confirmed By:Isaak Cid MD           ASSESSMENT/PLAN:     Active Hospital Problems    Diagnosis    *COPD (chronic obstructive pulmonary disease)    Hypokalemia    Acute hypercapnic respiratory failure    Cardiomyopathy    QT prolongation    VT (ventricular tachycardia)    BALDEMAR (generalized anxiety disorder)    Severe malnutrition    Anemia    Hyponatremia    Acute on chronic respiratory failure requiring mechanical ventilation    VT/torsades cardiac arrest with ROSC     Patient coded in emergency department  Intubated  Admit to  ICU  Consult to Critical Care      Right lung pneumonia, likely aspiration    Smoker    Abdominal pain    BMI less than 19,adult    Obstructive sleep apnea syndrome       ASSESSMENT & PLAN:     1. Cardiac arrest  2. Torsades/VT  3. Acute hypoxic respiratory failure  4. Malnutrition  5. Right lung pneumonia  6. Smoker  7. COPD  8. DANIS  9. Hypomagnesemia   10. LV Dysfunction LVEF 25%  11. Hypokalemia-replaced  12. Partial colonic obstruction        RECOMMENDATIONS:    Replace Electrolytes  Increase Metoprolol to TID today  Plan for Cardiac CTA tomorrow as this is safer for the patient in her condition   Consider life vest at discharge and if patient has any recurrent events, can consider defibrillator.               Lizzie Hernandez NP  [facility]  Department of Cardiology  Date of Service: 06/22/2022  2:44 PM

## 2022-06-22 NOTE — SUBJECTIVE & OBJECTIVE
Interval History: Ms Cast has reduced respiratory symptoms but has decided to go home with hospice    Review of Systems   Constitutional:  Positive for diaphoresis and fatigue.   HENT: Negative.     Eyes: Negative.    Respiratory:  Positive for cough, chest tightness and shortness of breath.         Hawkins  All reduced   Cardiovascular:  Positive for chest pain.   Gastrointestinal: Negative.    Endocrine: Positive for heat intolerance.   Genitourinary: Negative.    Musculoskeletal:  Positive for arthralgias, gait problem and myalgias.   Skin: Negative.    Allergic/Immunologic: Negative.    Neurological:  Positive for weakness.   Hematological:  Bruises/bleeds easily.   Psychiatric/Behavioral:  Positive for dysphoric mood. The patient is nervous/anxious.    Objective:     Vital Signs (Most Recent):  Temp: 97.8 °F (36.6 °C) (06/22/22 0730)  Pulse: 86 (06/22/22 0900)  Resp: 17 (06/22/22 0900)  BP: 121/75 (06/22/22 0900)  SpO2: (!) 92 % (06/22/22 0900)   Vital Signs (24h Range):  Temp:  [97.8 °F (36.6 °C)-98.5 °F (36.9 °C)] 97.8 °F (36.6 °C)  Pulse:  [] 86  Resp:  [14-46] 17  SpO2:  [88 %-100 %] 92 %  BP: ()/(55-88) 121/75     Weight: 47.6 kg (104 lb 15 oz)  Body mass index is 18.01 kg/m².    Intake/Output Summary (Last 24 hours) at 6/22/2022 1039  Last data filed at 6/22/2022 0630  Gross per 24 hour   Intake 1381.49 ml   Output 1700 ml   Net -318.51 ml      Physical Exam  Vitals and nursing note reviewed.   Constitutional:       General: She is not in acute distress.     Appearance: She is ill-appearing. She is not diaphoretic.   HENT:      Head: Atraumatic.      Comments: Temporal wasting     Nose: Nose normal.      Mouth/Throat:      Mouth: Mucous membranes are moist.   Eyes:      Extraocular Movements: Extraocular movements intact.      Pupils: Pupils are equal, round, and reactive to light.   Neck:      Comments: No use of accessory muscles at rest  Cardiovascular:      Rate and Rhythm: Normal rate and  regular rhythm.      Heart sounds:     Gallop present.   Pulmonary:      Effort: Pulmonary effort is normal.      Comments: Diminished breath sounds  Abdominal:      General: Bowel sounds are normal. There is no distension.      Tenderness: There is no abdominal tenderness. There is no guarding or rebound.   Musculoskeletal:         General: Normal range of motion.      Cervical back: Normal range of motion and neck supple.   Skin:     General: Skin is warm.   Neurological:      Mental Status: She is alert and oriented to person, place, and time.   Psychiatric:      Comments: Mildly dysphoric mood       Significant Labs: All pertinent labs within the past 24 hours have been reviewed.  Recent Lab Results         06/22/22  0412        Albumin 2.6       Alkaline Phosphatase 40       ALT 16       Anion Gap 3       AST 14       Baso # 0.01       Basophil % 0.2       BILIRUBIN TOTAL 0.4  Comment: For infants and newborns, interpretation of results should be based  on gestational age, weight and in agreement with clinical  observations.    Premature Infant recommended reference ranges:  Up to 24 hours.............<8.0 mg/dL  Up to 48 hours............<12.0 mg/dL  3-5 days..................<15.0 mg/dL  6-29 days.................<15.0 mg/dL         BUN 14       Calcium 7.7       Chloride 90       CO2 43  Comment: co2 critical result(s) repeated. Called and verbal readback obtained   from more clay rn sicu by WES 06/22/2022 05:46         Creatinine <0.3       Differential Method Automated       eGFR if  >60.0       eGFR if non  >60.0  Comment: Calculation used to obtain the estimated glomerular filtration  rate (eGFR) is the CKD-EPI equation.          Eos # 0.1       Eosinophil % 1.5       Glucose 89       Gran # (ANC) 3.9       Gran % 64.1       Hematocrit 30.5       Hemoglobin 9.0       Immature Grans (Abs) 0.02  Comment: Mild elevation in immature granulocytes is non specific and   can  be seen in a variety of conditions including stress response,   acute inflammation, trauma and pregnancy. Correlation with other   laboratory and clinical findings is essential.         Immature Granulocytes 0.3       Lymph # 1.4       Lymph % 23.2       Magnesium 1.7       MCH 28.2       MCHC 29.5       MCV 96       Mono # 0.7       Mono % 10.7       MPV 8.8       nRBC 0       Phosphorus 2.0       Platelets 205       Potassium 3.6       PROTEIN TOTAL 5.1       RBC 3.19       RDW 14.8       Sodium 136       WBC 6.07               Significant Imaging: I have reviewed all pertinent imaging results/findings within the past 24 hours.

## 2022-06-22 NOTE — CARE UPDATE
06/21/22 1946   Patient Assessment/Suction   Level of Consciousness (AVPU) alert   Respiratory Effort Normal;Unlabored   Expansion/Accessory Muscles/Retractions no use of accessory muscles   All Lung Fields Breath Sounds diminished   Rhythm/Pattern, Respiratory assisted mechanically   Cough Frequency infrequent   PRE-TX-O2   O2 Device (Oxygen Therapy) High Flow nasal Cannula   $ Is the patient on Low Flow Oxygen? Yes   Flow (L/min) 3   SpO2 95 %   Pulse Oximetry Type Continuous   $ Pulse Oximetry - Multiple Charge Pulse Oximetry - Multiple   Pulse 101   Resp (!) 22   Aerosol Therapy   $ Aerosol Therapy Charges Aerosol Treatment   Daily Review of Necessity (SVN) completed   Respiratory Treatment Status (SVN) given   Treatment Route (SVN) mask;oxygen   Patient Position (SVN) sitting in chair   Post Treatment Assessment (SVN) increased aeration   Signs of Intolerance (SVN) none   Preset CPAP/BiPAP Settings   Mode Of Delivery BiPAP S/T;Standby   $ CPAP/BiPAP Daily Charge BiPAP/CPAP Daily   Education   $ Education Bronchodilator;15 min   Respiratory Evaluation   $ Care Plan Tech Time 15 min   $ Eval/Re-eval Charges Re-evaluation

## 2022-06-23 LAB — VIT B1 BLD-SCNC: 102 NMOL/L (ref 66.5–200)

## 2022-06-23 PROCEDURE — 25000003 PHARM REV CODE 250: Performed by: NURSE PRACTITIONER

## 2022-06-23 PROCEDURE — 99900031 HC PATIENT EDUCATION (STAT)

## 2022-06-23 PROCEDURE — 94799 UNLISTED PULMONARY SVC/PX: CPT

## 2022-06-23 PROCEDURE — 25000003 PHARM REV CODE 250: Performed by: INTERNAL MEDICINE

## 2022-06-23 PROCEDURE — 27000221 HC OXYGEN, UP TO 24 HOURS

## 2022-06-23 PROCEDURE — 97116 GAIT TRAINING THERAPY: CPT

## 2022-06-23 PROCEDURE — 21000000 HC CCU ICU ROOM CHARGE

## 2022-06-23 PROCEDURE — 25000242 PHARM REV CODE 250 ALT 637 W/ HCPCS: Performed by: INTERNAL MEDICINE

## 2022-06-23 PROCEDURE — 99233 SBSQ HOSP IP/OBS HIGH 50: CPT | Mod: ,,, | Performed by: INTERNAL MEDICINE

## 2022-06-23 PROCEDURE — 94640 AIRWAY INHALATION TREATMENT: CPT

## 2022-06-23 PROCEDURE — 99900035 HC TECH TIME PER 15 MIN (STAT)

## 2022-06-23 PROCEDURE — 97530 THERAPEUTIC ACTIVITIES: CPT

## 2022-06-23 PROCEDURE — S4991 NICOTINE PATCH NONLEGEND: HCPCS | Performed by: INTERNAL MEDICINE

## 2022-06-23 PROCEDURE — 63600175 PHARM REV CODE 636 W HCPCS: Performed by: INTERNAL MEDICINE

## 2022-06-23 PROCEDURE — 94761 N-INVAS EAR/PLS OXIMETRY MLT: CPT

## 2022-06-23 PROCEDURE — 94660 CPAP INITIATION&MGMT: CPT

## 2022-06-23 PROCEDURE — 99233 PR SUBSEQUENT HOSPITAL CARE,LEVL III: ICD-10-PCS | Mod: ,,, | Performed by: INTERNAL MEDICINE

## 2022-06-23 RX ORDER — SERTRALINE HYDROCHLORIDE 25 MG/1
25 TABLET, FILM COATED ORAL DAILY
Status: DISCONTINUED | OUTPATIENT
Start: 2022-06-23 | End: 2022-06-25 | Stop reason: HOSPADM

## 2022-06-23 RX ORDER — FLUTICASONE FUROATE AND VILANTEROL 200; 25 UG/1; UG/1
1 POWDER RESPIRATORY (INHALATION) DAILY
Status: DISCONTINUED | OUTPATIENT
Start: 2022-06-23 | End: 2022-06-25 | Stop reason: HOSPADM

## 2022-06-23 RX ORDER — ALBUTEROL SULFATE 0.83 MG/ML
2.5 SOLUTION RESPIRATORY (INHALATION) EVERY 4 HOURS
Status: DISCONTINUED | OUTPATIENT
Start: 2022-06-23 | End: 2022-06-25 | Stop reason: HOSPADM

## 2022-06-23 RX ORDER — LEVOFLOXACIN 750 MG/1
750 TABLET ORAL DAILY
Status: COMPLETED | OUTPATIENT
Start: 2022-06-23 | End: 2022-06-25

## 2022-06-23 RX ADMIN — CHLORHEXIDINE GLUCONATE 15 ML: 1.2 RINSE ORAL at 09:06

## 2022-06-23 RX ADMIN — MEROPENEM AND SODIUM CHLORIDE 1 G: 1 INJECTION, SOLUTION INTRAVENOUS at 05:06

## 2022-06-23 RX ADMIN — MUPIROCIN: 20 OINTMENT TOPICAL at 09:06

## 2022-06-23 RX ADMIN — FLUTICASONE FUROATE AND VILANTEROL TRIFENATATE 1 PUFF: 200; 25 POWDER RESPIRATORY (INHALATION) at 11:06

## 2022-06-23 RX ADMIN — ONDANSETRON 4 MG: 2 INJECTION INTRAMUSCULAR; INTRAVENOUS at 09:06

## 2022-06-23 RX ADMIN — ALBUTEROL SULFATE 2.5 MG: 2.5 SOLUTION RESPIRATORY (INHALATION) at 11:06

## 2022-06-23 RX ADMIN — SIMETHICONE 80 MG: 80 TABLET, CHEWABLE ORAL at 05:06

## 2022-06-23 RX ADMIN — LEVOFLOXACIN 750 MG: 750 TABLET, FILM COATED ORAL at 09:06

## 2022-06-23 RX ADMIN — METOPROLOL TARTRATE 25 MG: 25 TABLET, FILM COATED ORAL at 03:06

## 2022-06-23 RX ADMIN — ENOXAPARIN SODIUM 40 MG: 40 INJECTION SUBCUTANEOUS at 04:06

## 2022-06-23 RX ADMIN — AMIODARONE HYDROCHLORIDE 200 MG: 200 TABLET ORAL at 09:06

## 2022-06-23 RX ADMIN — Medication 400 MG: at 09:06

## 2022-06-23 RX ADMIN — TIOTROPIUM BROMIDE INHALATION SPRAY 2 PUFF: 3.12 SPRAY, METERED RESPIRATORY (INHALATION) at 11:06

## 2022-06-23 RX ADMIN — METOPROLOL TARTRATE 25 MG: 25 TABLET, FILM COATED ORAL at 09:06

## 2022-06-23 RX ADMIN — IPRATROPIUM BROMIDE AND ALBUTEROL SULFATE 3 ML: .5; 3 SOLUTION RESPIRATORY (INHALATION) at 07:06

## 2022-06-23 RX ADMIN — ALBUTEROL SULFATE 2.5 MG: 2.5 SOLUTION RESPIRATORY (INHALATION) at 03:06

## 2022-06-23 RX ADMIN — PREDNISONE 30 MG: 5 TABLET ORAL at 09:06

## 2022-06-23 RX ADMIN — LATANOPROST 1 DROP: 50 SOLUTION OPHTHALMIC at 09:06

## 2022-06-23 RX ADMIN — ALBUTEROL SULFATE 2.5 MG: 2.5 SOLUTION RESPIRATORY (INHALATION) at 08:06

## 2022-06-23 RX ADMIN — GUAIFENESIN 1200 MG: 600 TABLET, EXTENDED RELEASE ORAL at 09:06

## 2022-06-23 RX ADMIN — OXYCODONE HYDROCHLORIDE AND ACETAMINOPHEN 1 TABLET: 10; 325 TABLET ORAL at 01:06

## 2022-06-23 RX ADMIN — IPRATROPIUM BROMIDE AND ALBUTEROL SULFATE 3 ML: .5; 3 SOLUTION RESPIRATORY (INHALATION) at 12:06

## 2022-06-23 RX ADMIN — SIMETHICONE 80 MG: 80 TABLET, CHEWABLE ORAL at 06:06

## 2022-06-23 RX ADMIN — ASPIRIN 81 MG CHEWABLE TABLET 81 MG: 81 TABLET CHEWABLE at 09:06

## 2022-06-23 RX ADMIN — NICOTINE 1 PATCH: 7 PATCH, EXTENDED RELEASE TRANSDERMAL at 09:06

## 2022-06-23 RX ADMIN — SERTRALINE HYDROCHLORIDE 25 MG: 25 TABLET ORAL at 01:06

## 2022-06-23 RX ADMIN — SIMETHICONE 80 MG: 80 TABLET, CHEWABLE ORAL at 12:06

## 2022-06-23 RX ADMIN — IPRATROPIUM BROMIDE AND ALBUTEROL SULFATE 3 ML: .5; 3 SOLUTION RESPIRATORY (INHALATION) at 05:06

## 2022-06-23 RX ADMIN — ALPRAZOLAM 0.25 MG: 0.25 TABLET ORAL at 04:06

## 2022-06-23 RX ADMIN — LOSARTAN POTASSIUM 12.5 MG: 25 TABLET, FILM COATED ORAL at 12:06

## 2022-06-23 RX ADMIN — ALPRAZOLAM 0.25 MG: 0.25 TABLET ORAL at 09:06

## 2022-06-23 NOTE — PLAN OF CARE
Problem: Physical Therapy  Goal: Physical Therapy Goal  Description: Goals to be met by: 2022     Patient will increase functional independence with mobility by performin. Supine to sit with Supervision  2. Sit to stand transfer with Supervision  3. Bed to chair transfer with Supervision using Rolling Walker  4. Gait  x 75 feet with Supervision using Rolling Walker.         Outcome: Met

## 2022-06-23 NOTE — PROGRESS NOTES
Progress Note  PULMONARY    Admit Date: 6/17/2022 06/23/2022  History of Present Illness:  Pt is a 55 yo female with COPD, malnutrition who presented to ED with resp distress and experienced code blue after arrival requiring 10min CPR, mag for torsades then had ROSC, intubated and was waking up and responsive. Pt admitted to ICU and pulmonary is consulted for further management/recommendations.  Further history is obtained over the phone with pt's sister and RAMIREZ Cassidy. She states pt was living in [place] where she had a pulmonologist caring for her, had very poor health with frequent admissions lately for COPD with CO2 retention (10 times so far this year) and moved to [place] to stay w/ sister 4 days ago. She developed acute on chronic abdominal pain and distention which made it difficult for her to breathe then had difficulty keeping sats up with home O2- uses 3L continuously. She was supposed to have NIV for home but had difficulty tolerating it and then difficulty getting it in [place]. Pt has had SBO requiring bowel resection 12/2021 and has had complications with abd pain, trouble eating off and on since then. She is noted to have SBO on this admission and surgery is consulted for further recommendations. Pt is a current smoker and wants to quit.       SUBJECTIVE:     6/19- pt waking up and writing notes to staff, continues on vent. cpap trial passed    6/20/2022 - Stable overnight but has remained on BiPAP.  In no distress.  She is anxious.  She shows very little insight into her overall health condition.  No new issues reported.  Had small BM yesterday but does feel that her abdomen is distended.  She is on Clinimix - to have PICC line placed (does have R CVL)    6/21/2022 - Stable overnight and has tolerated NC O2, using BiPAP when sleeping.  Tolerating liquids and is on TPN.  No new issues reported.  Remains weak and frail overall.  EKG noted this AM (cardiology following).    6/22/2022 - Stable  overnight, waiting on room availability to transfer out of ICU.  No new complaints.  She is considering possible DC with hospice but when I discussed code status she would want to be intubated and is undecided about whether she would want resuscitation.  We discussed this but about half way through the conversation she did not participate any more.  No new issues reported.    6/23/2022 - Stable overnight, sitting up in chair and is more interactive.  She is depressed.  We had long discussion about her lung and cardiac disease, cigarette use, diet, etc.  She has been able to ambulate but still feels weak.    NIPPV Order    Ordering noninvasive positive pressure volume ventilator for continuous use and PRN to reduce the risk of exacerbation and possible hospitalization.  Home BiPAP is insufficient due to the severity of the condition.  COPD/emphysema is the cause of the chronic respiratory failure/hypercapnea.        OBJECTIVE:     Vitals (Most recent):  Vitals:    06/23/22 0754   BP: 119/85   Pulse: 92   Resp: 20   Temp: 97.8 °F (36.6 °C)       Vitals (24 hour range):  Temp:  [97.3 °F (36.3 °C)-98.5 °F (36.9 °C)]   Pulse:  []   Resp:  [15-22]   BP: (101-127)/(66-85)   SpO2:  [91 %-97 %]       Intake/Output Summary (Last 24 hours) at 6/23/2022 0819  Last data filed at 6/23/2022 0642  Gross per 24 hour   Intake 2330.86 ml   Output 2900 ml   Net -569.14 ml          Physical Exam:  The patient's neuro status (alertness,orientation,cognitive function,motor skills,), pharyngeal exam (oral lesions, hygiene, abn dentition,), Neck (jvd,mass,thyroid,nodes in neck and above/below clavicle),RESPIRATORY(symmetry,effort,fremitus,percussion,auscultation),  Cor(rhythm,heart tones including gallops,perfusion,edema)ABD(distention,hepatic&splenomegaly,tenderness,masses), Skin(rash,cyanosis),Psyc(affect,judgement,).  Exam negative except for these pertinent findings:      Awake, alert, on BiPAP  Clear but diminished breath sounds,  + Ansari's sign  Abdomen is soft, nontender, BS+ and more active  No edema  Thin, cachectic   Bruising bilat arms    Radiographs reviewed: view by direct vision   CXR 6/19- hyperinflated, no significant change    Labs     No results for input(s): WBC, HGB, HCT, PLT, BAND, METAMYELOCYT, MYELOPCT, HGBA1C in the last 24 hours.  No results for input(s): NA, K, CL, CO2, BUN, CREATININE, GLU, CALCIUM, CAION, MG, PHOS, AST, ALT, ALKPHOS, BILITOT, BILIDIR, PROT, ALBUMIN, PREALBUMIN, AMYLASE, LIPASE, CRP, HSCRP, SEDRATE, PROCAL, INR, PTT, LABHEPA, LACTATE, TROPONINI, CPK, CPKMB, MB, BNP in the last 24 hours.  No results for input(s): PH, PCO2, PO2, HCO3 in the last 24 hours.  Microbiology Results (last 7 days)     Procedure Component Value Units Date/Time    Blood culture #1 **CANNOT BE ORDERED STAT** [[id number]] Collected: 06/17/22 1307    Order Status: Completed Specimen: Blood from Peripheral, Antecubital, Left Updated: 06/22/22 1432     Blood Culture, Routine No growth after 5 days.    Blood culture #2 **CANNOT BE ORDERED STAT** [[id number]] Collected: 06/17/22 1320    Order Status: Completed Specimen: Blood from Peripheral, Antecubital, Right Updated: 06/22/22 1432     Blood Culture, Routine No growth after 5 days.    Culture, Respiratory with Gram Stain [[id number]] Collected: 06/18/22 1535    Order Status: Completed Specimen: Respiratory from Tracheal Aspirate Updated: 06/20/22 0655     Respiratory Culture No normal respiratory mane      No growth     Gram Stain (Respiratory) Many WBC's     Gram Stain (Respiratory) <10 epithelial cells per low power field.     Gram Stain (Respiratory) No organisms seen    MRSA Screen by PCR [[id number]] Collected: 06/18/22 0121    Order Status: Completed Specimen: Nasopharyngeal Swab from Nasal Updated: 06/18/22 0358     MRSA SCREEN BY PCR Negative          Impression:  Active Hospital Problems    Diagnosis  POA    *COPD (chronic obstructive pulmonary disease) [J44.9]  Yes     Chronic     Hypokalemia [E87.6]  No    Acute hypercapnic respiratory failure [J96.02]  Yes    Cardiomyopathy [I42.9]  Yes     Chronic    QT prolongation [R94.31]  Yes     Chronic    VT (ventricular tachycardia) [I47.2]  Clinically Undetermined    BALDEMAR (generalized anxiety disorder) [F41.1]  Yes     Chronic    Severe malnutrition [E43]  Yes     Chronic    Anemia [D64.9]  Yes     Chronic    Hyponatremia [E87.1]  No    Acute on chronic respiratory failure requiring mechanical ventilation [J96.10]  Yes    VT/torsades cardiac arrest with ROSC [I46.9]  Yes     Patient coded in emergency department  Intubated  Admit to ICU  Consult to Critical Care      Right lung pneumonia, likely aspiration [J18.9]  Clinically Undetermined    Smoker [F17.200]  Yes    Abdominal pain [R10.9]  Yes    BMI less than 19,adult [Z68.1]  Not Applicable    Obstructive sleep apnea syndrome [G47.33]  Yes      Resolved Hospital Problems    Diagnosis Date Resolved POA    Possible bowel obstruction  [K56.609] 06/20/2022 Yes    Leucocytosis [D72.829] 06/19/2022 Yes               Plan:     - BiPAP as needed and when sleeping, O2 as able - her baseline paCO2 is probably 70-80  - will work on getting NIPPV for home  - continue inhaled bronchodilators  - she will need ICS/LABA/LAMA at DC  - d/w her about diet  - change tyo po antibiotics and complete 7 days total  - pepcid for GI prophylaxis  - lovenox for DVT prophylaxis  - further cardiac management per cardiology - note has been read  - increase activity as able  - overall prognosis is very poor with severe lung disease, severe CM, severe malnutrition            Delfino Cano MD  [facility] Pulmonary/Critical Care                                          .

## 2022-06-23 NOTE — PLAN OF CARE
Goals to be met by: 2022     Patient will increase functional independence with mobility by performin. Supine to sit Independent  2. Sit to stand transfer Independent  3. Bed to chair transfer Independent  4. Gait  x 250 feet Independent

## 2022-06-23 NOTE — PLAN OF CARE
Problem: Adjustment to Illness COPD (Chronic Obstructive Pulmonary Disease)  Goal: Optimal Chronic Illness Coping  Outcome: Ongoing, Progressing     Problem: Functional Ability Impaired COPD (Chronic Obstructive Pulmonary Disease)  Goal: Optimal Level of Functional [place]  Outcome: Ongoing, Progressing     Problem: Infection COPD (Chronic Obstructive Pulmonary Disease)  Goal: Absence of Infection Signs and Symptoms  Outcome: Ongoing, Progressing     Problem: Oral Intake Inadequate COPD (Chronic Obstructive Pulmonary Disease)  Goal: Improved Nutrition Intake  Outcome: Ongoing, Progressing     Problem: Respiratory Compromise COPD (Chronic Obstructive Pulmonary Disease)  Goal: Effective Oxygenation and Ventilation  Outcome: Ongoing, Progressing     Problem: Adult Inpatient Plan of Care  Goal: Plan of Care Review  Outcome: Ongoing, Progressing  Goal: Patient-Specific Goal (Individualized)  Outcome: Ongoing, Progressing  Goal: Absence of Hospital-Acquired Illness or Injury  Outcome: Ongoing, Progressing  Goal: Optimal Comfort and Wellbeing  Outcome: Ongoing, Progressing  Goal: Readiness for Transition of Care  Outcome: Ongoing, Progressing     Problem: Infection  Goal: Absence of Infection Signs and Symptoms  Outcome: Ongoing, Progressing     Problem: Fluid Imbalance (Pneumonia)  Goal: Fluid Balance  Outcome: Ongoing, Progressing     Problem: Infection (Pneumonia)  Goal: Resolution of Infection Signs and Symptoms  Outcome: Ongoing, Progressing     Problem: Respiratory Compromise (Pneumonia)  Goal: Effective Oxygenation and Ventilation  Outcome: Ongoing, Progressing     Problem: Communication Impairment (Mechanical Ventilation, Invasive)  Goal: Effective Communication  Outcome: Ongoing, Progressing     Problem: Device-Related Complication Risk (Mechanical Ventilation, Invasive)  Goal: Optimal Device Function  Outcome: Ongoing, Progressing     Problem: Inability to Wean (Mechanical Ventilation, Invasive)  Goal:  Mechanical Ventilation Liberation  Outcome: Ongoing, Progressing     Problem: Nutrition Impairment (Mechanical Ventilation, Invasive)  Goal: Optimal Nutrition Delivery  Outcome: Ongoing, Progressing     Problem: Skin and Tissue Injury (Mechanical Ventilation, Invasive)  Goal: Absence of Device-Related Skin and Tissue Injury  Outcome: Ongoing, Progressing     Problem: Ventilator-Induced Lung Injury (Mechanical Ventilation, Invasive)  Goal: Absence of Ventilator-Induced Lung Injury  Outcome: Ongoing, Progressing     Problem: Communication Impairment (Artificial Airway)  Goal: Effective Communication  Outcome: Ongoing, Progressing     Problem: Device-Related Complication Risk (Artificial Airway)  Goal: Optimal Device Function  Outcome: Ongoing, Progressing     Problem: Skin and Tissue Injury (Artificial Airway)  Goal: Absence of Device-Related Skin or Tissue Injury  Outcome: Ongoing, Progressing     Problem: Noninvasive Ventilation Acute  Goal: Effective Unassisted Ventilation and Oxygenation  Outcome: Ongoing, Progressing     Problem: Skin Injury Risk Increased  Goal: Skin Health and Integrity  Outcome: Ongoing, Progressing     Problem: Fall Injury Risk  Goal: Absence of Fall and Fall-Related Injury  Outcome: Ongoing, Progressing     Problem: Parenteral Nutrition  Goal: Effective Intravenous Nutrition Therapy Delivery  Outcome: Ongoing, Progressing

## 2022-06-23 NOTE — ASSESSMENT & PLAN NOTE
Intubated in the ED. On 06/19 extubated to BiPAP  P.r.n. ABG and chest x-ray  Pulmonary following    Pt has improved respiratory status but has a poor overall prognosis. She is strongly considering hospice care

## 2022-06-23 NOTE — PT/OT/SLP PROGRESS
Physical Therapy Treatment    Patient Name:  Estelle Cast   MRN:  [id number]    Recommendations:     Discharge Recommendations:  home health PT   Discharge Equipment Recommendations: walker, rolling   Barriers to discharge: Decreased caregiver support    Assessment:     Estelle Cast is a 56 y.o. female admitted with a medical diagnosis of Chronic respiratory failure.  She presents with the following impairments/functional limitations:  weakness, impaired endurance, impaired self care skills, gait instability, impaired balance, impaired cardiopulmonary response to activity. Pt found sitting upright in chair and agreeable to working with PT. Pt tolerated session well and required supervision for safe mobilization during session today. Pt would benefit from acute PT during hospitalization to increase strength, endurance and safety with mobility and would benefit from HHPT upon discharge home.    Rehab Prognosis: Good; patient would benefit from acute skilled PT services to address these deficits and reach maximum level of function.    Recent Surgery: * No surgery found *      Plan:     During this hospitalization, patient to be seen 6 x/week to address the identified rehab impairments via gait training, therapeutic activities, therapeutic exercises and progress toward the following goals:    · Plan of Care Expires:  07/20/22    Subjective     Chief Complaint: Needing to make a BM  Patient/Family Comments/goals: D/C home  Pain/Comfort:  · Pain Rating 1: 0/10      Objective:     Communicated with RN prior to session.  Patient found up in chair with blood pressure cuff, oxygen, peripheral IV, PICC line, pulse ox (continuous), telemetry, eugene catheter upon PT entry to room.     General Precautions: Standard, fall   Orthopedic Precautions:N/A   Braces: N/A  Respiratory Status: Nasal cannula, flow 4 L/min     Functional Mobility:  · Transfers:     · Sit to Stand:  supervision with no AD  · Gait: Pt tolerated gt  x120' no AD under supervision on nc 4L O2. Pt's SPO2 was >90% throughout ambulation. Pt reported needing to make a BM at the end of session and SPT/PT assisted her in getting seated on BSC. Balance during ambulation was fair, and pt could benefit from balance training in future sessions.      AM-PAC 6 CLICK MOBILITY  Turning over in bed (including adjusting bedclothes, sheets and blankets)?: 4  Sitting down on and standing up from a chair with arms (e.g., wheelchair, bedside commode, etc.): 4  Moving from lying on back to sitting on the side of the bed?: 4  Moving to and from a bed to a chair (including a wheelchair)?: 4  Need to walk in hospital room?: 4  Climbing 3-5 steps with a railing?: 3  Basic Mobility Total Score: 23       Therapeutic Activities and Exercises:   Pt was educated on the following: call light use, importance of OOB activity and functional mobility to negate the negative effects of prolonged bed rest during this hospitalization, safe transfers/ambulation and discharge planning recommendations/options.    Patient left on BSC with all lines intact, call button in reach and RN notified..    GOALS:   Multidisciplinary Problems     Physical Therapy Goals        Problem: Physical Therapy    Goal Priority Disciplines Outcome Goal Variances Interventions   Physical Therapy Goal     PT, PT/OT      Description: Goals to be met by: 2022     Patient will increase functional independence with mobility by performin. Supine to sit Independent  2. Sit to stand transfer Independent  3. Bed to chair transfer Independent  4. Gait  x 250 feet Independent                    Multidisciplinary Problems (Resolved)        Problem: Physical Therapy    Goal Priority Disciplines Outcome Goal Variances Interventions   Physical Therapy Goal   (Resolved)     PT, PT/OT Met     Description: Goals to be met by: 2022     Patient will increase functional independence with mobility by performin. Supine to  sit with Supervision  2. Sit to stand transfer with Supervision  3. Bed to chair transfer with Supervision using Rolling Walker  4. Gait  x 75 feet with Supervision using Rolling Walker.                          Time Tracking:     PT Received On: 06/23/22  PT Start Time: 1120     PT Stop Time: 1146  PT Total Time (min): 26 min     Billable Minutes: Gait Training 13 and Therapeutic Activity 13    Treatment Type: Treatment  PT/PTA: PT     PTA Visit Number: 0     06/23/2022

## 2022-06-23 NOTE — PLAN OF CARE
Following up with Dr. Leung this evening.  He has spoken to Dr. Cano who is to document settings when possible.  Per Dr. Leung, plan is for discharge in AM with hospice.

## 2022-06-23 NOTE — SUBJECTIVE & OBJECTIVE
Interval History: Ms Cast is depressed without suicidal or violent ideation. She must decide Re hospice or active care    Review of Systems   Constitutional:  Positive for activity change, diaphoresis and fatigue.   HENT: Negative.     Eyes: Negative.    Respiratory:  Positive for cough, chest tightness and shortness of breath.         [place]   Cardiovascular:  Positive for palpitations.   Gastrointestinal: Negative.    Endocrine: Positive for heat intolerance.   Genitourinary: Negative.    Musculoskeletal:  Positive for arthralgias, gait problem and myalgias.   Skin: Negative.    Allergic/Immunologic: Negative.    Neurological:  Positive for weakness.   Hematological:  Bruises/bleeds easily.   Psychiatric/Behavioral:  Positive for dysphoric mood. The patient is nervous/anxious.    Objective:     Vital Signs (Most Recent):  Temp: 98.3 °F (36.8 °C) (06/23/22 1153)  Pulse: 86 (06/23/22 1153)  Resp: 16 (06/23/22 1153)  BP: 113/78 (06/23/22 1153)  SpO2: 100 % (06/23/22 1153) Vital Signs (24h Range):  Temp:  [97.3 °F (36.3 °C)-98.5 °F (36.9 °C)] 98.3 °F (36.8 °C)  Pulse:  [] 86  Resp:  [15-22] 16  SpO2:  [91 %-100 %] 100 %  BP: (101-127)/(66-85) 113/78     Weight: 47.6 kg (104 lb 15 oz)  Body mass index is 18.01 kg/m².    Intake/Output Summary (Last 24 hours) at 6/23/2022 1301  Last data filed at 6/23/2022 1202  Gross per 24 hour   Intake 2690.86 ml   Output 3450 ml   Net -759.14 ml      Physical Exam  Vitals and nursing note reviewed.   Constitutional:       General: She is not in acute distress.     Appearance: She is ill-appearing. She is not diaphoretic.   HENT:      Head: Normocephalic and atraumatic.      Nose: Nose normal.      Mouth/Throat:      Mouth: Mucous membranes are moist.   Eyes:      Extraocular Movements: Extraocular movements intact.      Pupils: Pupils are equal, round, and reactive to light.   Cardiovascular:      Rate and Rhythm: Normal rate and regular rhythm.   Pulmonary:      Effort:  Pulmonary effort is normal.      Breath sounds: Normal breath sounds.   Abdominal:      General: Bowel sounds are normal. There is no distension.      Tenderness: There is no abdominal tenderness. There is no guarding or rebound.   Musculoskeletal:         General: Normal range of motion.      Cervical back: Normal range of motion.   Skin:     General: Skin is warm.   Neurological:      Mental Status: She is alert and oriented to person, place, and time.   Psychiatric:      Comments: Depressed/dysphoric mood       Significant Labs: All pertinent labs within the past 24 hours have been reviewed.  Recent Lab Results       None            Significant Imaging: I have reviewed all pertinent imaging results/findings within the past 24 hours.

## 2022-06-23 NOTE — CARE UPDATE
06/23/22 0741   Patient Assessment/Suction   Level of Consciousness (AVPU) alert   All Lung Fields Breath Sounds diminished   PRE-TX-O2   O2 Device (Oxygen Therapy) High Flow nasal Cannula   $ Is the patient on Low Flow Oxygen? Yes   Flow (L/min) 4   SpO2 95 %   Pulse Oximetry Type Continuous   $ Pulse Oximetry - Multiple Charge Pulse Oximetry - Multiple   Pulse 90   Resp 15   Aerosol Therapy   $ Aerosol Therapy Charges Aerosol Treatment   Daily Review of Necessity (SVN) completed   Respiratory Treatment Status (SVN) given   Treatment Route (SVN) mask   Patient Position (SVN) semi-Akers's   Post Treatment Assessment (SVN) increased aeration   Signs of Intolerance (SVN) none   Breath Sounds Post-Respiratory Treatment   Throughout All Fields Post-Treatment aeration increased   Post-treatment Heart Rate (beats/min) 86   Post-treatment Resp Rate (breaths/min) 15   Skin Integrity   $ Wound Care Tech Time 15 min   Area Observed Bridge of nose   Skin Appearance without discoloration   Preset CPAP/BiPAP Settings   $ CPAP/BiPAP Daily Charge BiPAP/CPAP Daily   Education   $ Education Bronchodilator;15 min   Respiratory Evaluation   $ Care Plan Tech Time 15 min

## 2022-06-23 NOTE — PROGRESS NOTES
Patient has decided on hospice. TPN stopped. RD to sign off. If plan of care changes, please consult RD.    Dee Wakefield RD 06/23/2022 8:31 AM

## 2022-06-23 NOTE — PLAN OF CARE
Problem: Adjustment to Illness COPD (Chronic Obstructive Pulmonary Disease)  Goal: Optimal Chronic Illness Coping  Outcome: Ongoing, Progressing     Problem: Functional Ability Impaired COPD (Chronic Obstructive Pulmonary Disease)  Goal: Optimal Level of Functional [place]  Outcome: Ongoing, Progressing     Problem: Infection COPD (Chronic Obstructive Pulmonary Disease)  Goal: Absence of Infection Signs and Symptoms  Outcome: Ongoing, Progressing     Problem: Oral Intake Inadequate COPD (Chronic Obstructive Pulmonary Disease)  Goal: Improved Nutrition Intake  Outcome: Ongoing, Progressing     Problem: Respiratory Compromise COPD (Chronic Obstructive Pulmonary Disease)  Goal: Effective Oxygenation and Ventilation  Outcome: Ongoing, Progressing     Problem: Adult Inpatient Plan of Care  Goal: Plan of Care Review  Outcome: Ongoing, Progressing  Goal: Patient-Specific Goal (Individualized)  Outcome: Ongoing, Progressing  Goal: Absence of Hospital-Acquired Illness or Injury  Outcome: Ongoing, Progressing  Goal: Optimal Comfort and Wellbeing  Outcome: Ongoing, Progressing  Goal: Readiness for Transition of Care  Outcome: Ongoing, Progressing     Problem: Infection  Goal: Absence of Infection Signs and Symptoms  Outcome: Ongoing, Progressing     Problem: Fluid Imbalance (Pneumonia)  Goal: Fluid Balance  Outcome: Ongoing, Progressing     Problem: Infection (Pneumonia)  Goal: Resolution of Infection Signs and Symptoms  Outcome: Ongoing, Progressing     Problem: Respiratory Compromise (Pneumonia)  Goal: Effective Oxygenation and Ventilation  Outcome: Ongoing, Progressing     Problem: Communication Impairment (Mechanical Ventilation, Invasive)  Goal: Effective Communication  Outcome: Ongoing, Progressing     Problem: Device-Related Complication Risk (Mechanical Ventilation, Invasive)  Goal: Optimal Device Function  Outcome: Ongoing, Progressing     Problem: Inability to Wean (Mechanical Ventilation, Invasive)  Goal:  Mechanical Ventilation Liberation  Outcome: Ongoing, Progressing     Problem: Nutrition Impairment (Mechanical Ventilation, Invasive)  Goal: Optimal Nutrition Delivery  Outcome: Ongoing, Progressing     Problem: Skin and Tissue Injury (Mechanical Ventilation, Invasive)  Goal: Absence of Device-Related Skin and Tissue Injury  Outcome: Ongoing, Progressing     Problem: Ventilator-Induced Lung Injury (Mechanical Ventilation, Invasive)  Goal: Absence of Ventilator-Induced Lung Injury  Outcome: Ongoing, Progressing     Problem: Communication Impairment (Artificial Airway)  Goal: Effective Communication  Outcome: Ongoing, Progressing     Problem: Device-Related Complication Risk (Artificial Airway)  Goal: Optimal Device Function  Outcome: Ongoing, Progressing     Problem: Skin and Tissue Injury (Artificial Airway)  Goal: Absence of Device-Related Skin or Tissue Injury  Outcome: Ongoing, Progressing     Problem: Noninvasive Ventilation Acute  Goal: Effective Unassisted Ventilation and Oxygenation  Outcome: Ongoing, Progressing     Problem: Skin Injury Risk Increased  Goal: Skin Health and Integrity  Outcome: Ongoing, Progressing     Problem: Fall Injury Risk  Goal: Absence of Fall and Fall-Related Injury  Outcome: Ongoing, Progressing     Problem: Parenteral Nutrition  Goal: Effective Intravenous Nutrition Therapy Delivery  Outcome: Ongoing, Progressing

## 2022-06-24 LAB
ALBUMIN SERPL BCP-MCNC: 3 G/DL (ref 3.5–5.2)
ALBUMIN SERPL BCP-MCNC: 3 G/DL (ref 3.5–5.2)
ALP SERPL-CCNC: 50 U/L (ref 55–135)
ALP SERPL-CCNC: 50 U/L (ref 55–135)
ALT SERPL W/O P-5'-P-CCNC: 16 U/L (ref 10–44)
ALT SERPL W/O P-5'-P-CCNC: 16 U/L (ref 10–44)
ANION GAP SERPL CALC-SCNC: 5 MMOL/L (ref 8–16)
ANION GAP SERPL CALC-SCNC: 5 MMOL/L (ref 8–16)
AST SERPL-CCNC: 15 U/L (ref 10–40)
AST SERPL-CCNC: 15 U/L (ref 10–40)
BASOPHILS # BLD AUTO: 0.01 K/UL (ref 0–0.2)
BASOPHILS # BLD AUTO: 0.01 K/UL (ref 0–0.2)
BASOPHILS NFR BLD: 0.1 % (ref 0–1.9)
BASOPHILS NFR BLD: 0.1 % (ref 0–1.9)
BILIRUB SERPL-MCNC: 0.3 MG/DL (ref 0.1–1)
BILIRUB SERPL-MCNC: 0.3 MG/DL (ref 0.1–1)
BUN SERPL-MCNC: 10 MG/DL (ref 6–20)
BUN SERPL-MCNC: 10 MG/DL (ref 6–20)
CALCIUM SERPL-MCNC: 8 MG/DL (ref 8.7–10.5)
CALCIUM SERPL-MCNC: 8 MG/DL (ref 8.7–10.5)
CHLORIDE SERPL-SCNC: 83 MMOL/L (ref 95–110)
CHLORIDE SERPL-SCNC: 83 MMOL/L (ref 95–110)
CO2 SERPL-SCNC: 46 MMOL/L (ref 23–29)
CO2 SERPL-SCNC: 46 MMOL/L (ref 23–29)
CREAT SERPL-MCNC: 0.4 MG/DL (ref 0.5–1.4)
CREAT SERPL-MCNC: 0.4 MG/DL (ref 0.5–1.4)
DIFFERENTIAL METHOD: ABNORMAL
DIFFERENTIAL METHOD: ABNORMAL
EOSINOPHIL # BLD AUTO: 0 K/UL (ref 0–0.5)
EOSINOPHIL # BLD AUTO: 0 K/UL (ref 0–0.5)
EOSINOPHIL NFR BLD: 0.2 % (ref 0–8)
EOSINOPHIL NFR BLD: 0.2 % (ref 0–8)
ERYTHROCYTE [DISTWIDTH] IN BLOOD BY AUTOMATED COUNT: 14.9 % (ref 11.5–14.5)
ERYTHROCYTE [DISTWIDTH] IN BLOOD BY AUTOMATED COUNT: 14.9 % (ref 11.5–14.5)
EST. GFR  (AFRICAN AMERICAN): >60 ML/MIN/1.73 M^2
EST. GFR  (AFRICAN AMERICAN): >60 ML/MIN/1.73 M^2
EST. GFR  (NON AFRICAN AMERICAN): >60 ML/MIN/1.73 M^2
EST. GFR  (NON AFRICAN AMERICAN): >60 ML/MIN/1.73 M^2
GLUCOSE SERPL-MCNC: 93 MG/DL (ref 70–110)
GLUCOSE SERPL-MCNC: 93 MG/DL (ref 70–110)
HCT VFR BLD AUTO: 31.3 % (ref 37–48.5)
HCT VFR BLD AUTO: 31.3 % (ref 37–48.5)
HGB BLD-MCNC: 9.4 G/DL (ref 12–16)
HGB BLD-MCNC: 9.4 G/DL (ref 12–16)
IMM GRANULOCYTES # BLD AUTO: 0.04 K/UL (ref 0–0.04)
IMM GRANULOCYTES # BLD AUTO: 0.04 K/UL (ref 0–0.04)
IMM GRANULOCYTES NFR BLD AUTO: 0.5 % (ref 0–0.5)
IMM GRANULOCYTES NFR BLD AUTO: 0.5 % (ref 0–0.5)
LYMPHOCYTES # BLD AUTO: 1.3 K/UL (ref 1–4.8)
LYMPHOCYTES # BLD AUTO: 1.3 K/UL (ref 1–4.8)
LYMPHOCYTES NFR BLD: 14.9 % (ref 18–48)
LYMPHOCYTES NFR BLD: 14.9 % (ref 18–48)
MAGNESIUM SERPL-MCNC: 1.4 MG/DL (ref 1.6–2.6)
MAGNESIUM SERPL-MCNC: 1.4 MG/DL (ref 1.6–2.6)
MCH RBC QN AUTO: 27.3 PG (ref 27–31)
MCH RBC QN AUTO: 27.3 PG (ref 27–31)
MCHC RBC AUTO-ENTMCNC: 30 G/DL (ref 32–36)
MCHC RBC AUTO-ENTMCNC: 30 G/DL (ref 32–36)
MCV RBC AUTO: 91 FL (ref 82–98)
MCV RBC AUTO: 91 FL (ref 82–98)
MONOCYTES # BLD AUTO: 0.8 K/UL (ref 0.3–1)
MONOCYTES # BLD AUTO: 0.8 K/UL (ref 0.3–1)
MONOCYTES NFR BLD: 8.9 % (ref 4–15)
MONOCYTES NFR BLD: 8.9 % (ref 4–15)
NEUTROPHILS # BLD AUTO: 6.4 K/UL (ref 1.8–7.7)
NEUTROPHILS # BLD AUTO: 6.4 K/UL (ref 1.8–7.7)
NEUTROPHILS NFR BLD: 75.4 % (ref 38–73)
NEUTROPHILS NFR BLD: 75.4 % (ref 38–73)
NRBC BLD-RTO: 0 /100 WBC
NRBC BLD-RTO: 0 /100 WBC
PHOSPHATE SERPL-MCNC: 2.9 MG/DL (ref 2.7–4.5)
PHOSPHATE SERPL-MCNC: 2.9 MG/DL (ref 2.7–4.5)
PLATELET # BLD AUTO: 287 K/UL (ref 150–450)
PLATELET # BLD AUTO: 287 K/UL (ref 150–450)
PLATELET BLD QL SMEAR: ABNORMAL
PLATELET BLD QL SMEAR: ABNORMAL
PMV BLD AUTO: 8.2 FL (ref 9.2–12.9)
PMV BLD AUTO: 8.2 FL (ref 9.2–12.9)
POTASSIUM SERPL-SCNC: 3.7 MMOL/L (ref 3.5–5.1)
POTASSIUM SERPL-SCNC: 3.7 MMOL/L (ref 3.5–5.1)
PROT SERPL-MCNC: 5.7 G/DL (ref 6–8.4)
PROT SERPL-MCNC: 5.7 G/DL (ref 6–8.4)
RBC # BLD AUTO: 3.44 M/UL (ref 4–5.4)
RBC # BLD AUTO: 3.44 M/UL (ref 4–5.4)
SODIUM SERPL-SCNC: 134 MMOL/L (ref 136–145)
SODIUM SERPL-SCNC: 134 MMOL/L (ref 136–145)
WBC # BLD AUTO: 8.53 K/UL (ref 3.9–12.7)
WBC # BLD AUTO: 8.53 K/UL (ref 3.9–12.7)

## 2022-06-24 PROCEDURE — 25000003 PHARM REV CODE 250: Performed by: INTERNAL MEDICINE

## 2022-06-24 PROCEDURE — 97116 GAIT TRAINING THERAPY: CPT | Mod: CQ

## 2022-06-24 PROCEDURE — 25000003 PHARM REV CODE 250: Performed by: NURSE PRACTITIONER

## 2022-06-24 PROCEDURE — 63600175 PHARM REV CODE 636 W HCPCS: Performed by: INTERNAL MEDICINE

## 2022-06-24 PROCEDURE — 99900031 HC PATIENT EDUCATION (STAT)

## 2022-06-24 PROCEDURE — 94640 AIRWAY INHALATION TREATMENT: CPT

## 2022-06-24 PROCEDURE — 99900035 HC TECH TIME PER 15 MIN (STAT)

## 2022-06-24 PROCEDURE — 94660 CPAP INITIATION&MGMT: CPT

## 2022-06-24 PROCEDURE — 94799 UNLISTED PULMONARY SVC/PX: CPT

## 2022-06-24 PROCEDURE — 99232 SBSQ HOSP IP/OBS MODERATE 35: CPT | Mod: ,,, | Performed by: INTERNAL MEDICINE

## 2022-06-24 PROCEDURE — 85025 COMPLETE CBC W/AUTO DIFF WBC: CPT | Performed by: INTERNAL MEDICINE

## 2022-06-24 PROCEDURE — 99232 PR SUBSEQUENT HOSPITAL CARE,LEVL II: ICD-10-PCS | Mod: ,,, | Performed by: INTERNAL MEDICINE

## 2022-06-24 PROCEDURE — 83735 ASSAY OF MAGNESIUM: CPT | Performed by: INTERNAL MEDICINE

## 2022-06-24 PROCEDURE — 25000242 PHARM REV CODE 250 ALT 637 W/ HCPCS: Performed by: INTERNAL MEDICINE

## 2022-06-24 PROCEDURE — 27000221 HC OXYGEN, UP TO 24 HOURS

## 2022-06-24 PROCEDURE — 94761 N-INVAS EAR/PLS OXIMETRY MLT: CPT

## 2022-06-24 PROCEDURE — 80053 COMPREHEN METABOLIC PANEL: CPT | Performed by: INTERNAL MEDICINE

## 2022-06-24 PROCEDURE — S4991 NICOTINE PATCH NONLEGEND: HCPCS | Performed by: INTERNAL MEDICINE

## 2022-06-24 PROCEDURE — 84100 ASSAY OF PHOSPHORUS: CPT | Performed by: INTERNAL MEDICINE

## 2022-06-24 PROCEDURE — 21000000 HC CCU ICU ROOM CHARGE

## 2022-06-24 RX ORDER — POTASSIUM CHLORIDE 20 MEQ/1
40 TABLET, EXTENDED RELEASE ORAL ONCE
Status: COMPLETED | OUTPATIENT
Start: 2022-06-24 | End: 2022-06-24

## 2022-06-24 RX ORDER — LANOLIN ALCOHOL/MO/W.PET/CERES
1000 CREAM (GRAM) TOPICAL DAILY
Status: DISCONTINUED | OUTPATIENT
Start: 2022-06-25 | End: 2022-06-25 | Stop reason: HOSPADM

## 2022-06-24 RX ADMIN — ONDANSETRON 4 MG: 2 INJECTION INTRAMUSCULAR; INTRAVENOUS at 10:06

## 2022-06-24 RX ADMIN — ASPIRIN 81 MG CHEWABLE TABLET 81 MG: 81 TABLET CHEWABLE at 08:06

## 2022-06-24 RX ADMIN — NICOTINE 1 PATCH: 7 PATCH, EXTENDED RELEASE TRANSDERMAL at 08:06

## 2022-06-24 RX ADMIN — LOSARTAN POTASSIUM 12.5 MG: 25 TABLET, FILM COATED ORAL at 08:06

## 2022-06-24 RX ADMIN — ALBUTEROL SULFATE 2.5 MG: 2.5 SOLUTION RESPIRATORY (INHALATION) at 07:06

## 2022-06-24 RX ADMIN — ALPRAZOLAM 0.25 MG: 0.25 TABLET ORAL at 02:06

## 2022-06-24 RX ADMIN — MAGNESIUM SULFATE HEPTAHYDRATE 4 G: 40 INJECTION, SOLUTION INTRAVENOUS at 08:06

## 2022-06-24 RX ADMIN — METOPROLOL TARTRATE 25 MG: 25 TABLET, FILM COATED ORAL at 08:06

## 2022-06-24 RX ADMIN — FLUTICASONE FUROATE AND VILANTEROL TRIFENATATE 1 PUFF: 200; 25 POWDER RESPIRATORY (INHALATION) at 07:06

## 2022-06-24 RX ADMIN — METOPROLOL TARTRATE 25 MG: 25 TABLET, FILM COATED ORAL at 02:06

## 2022-06-24 RX ADMIN — AMIODARONE HYDROCHLORIDE 200 MG: 200 TABLET ORAL at 08:06

## 2022-06-24 RX ADMIN — OXYCODONE HYDROCHLORIDE AND ACETAMINOPHEN 1 TABLET: 10; 325 TABLET ORAL at 08:06

## 2022-06-24 RX ADMIN — GUAIFENESIN 1200 MG: 600 TABLET, EXTENDED RELEASE ORAL at 08:06

## 2022-06-24 RX ADMIN — ALBUTEROL SULFATE 2.5 MG: 2.5 SOLUTION RESPIRATORY (INHALATION) at 03:06

## 2022-06-24 RX ADMIN — SERTRALINE HYDROCHLORIDE 25 MG: 25 TABLET ORAL at 08:06

## 2022-06-24 RX ADMIN — LEVOFLOXACIN 750 MG: 750 TABLET, FILM COATED ORAL at 05:06

## 2022-06-24 RX ADMIN — CHLORHEXIDINE GLUCONATE 15 ML: 1.2 RINSE ORAL at 08:06

## 2022-06-24 RX ADMIN — PREDNISONE 30 MG: 5 TABLET ORAL at 08:06

## 2022-06-24 RX ADMIN — Medication 400 MG: at 08:06

## 2022-06-24 RX ADMIN — TIOTROPIUM BROMIDE INHALATION SPRAY 2 PUFF: 3.12 SPRAY, METERED RESPIRATORY (INHALATION) at 07:06

## 2022-06-24 RX ADMIN — MUPIROCIN: 20 OINTMENT TOPICAL at 08:06

## 2022-06-24 RX ADMIN — SIMETHICONE 80 MG: 80 TABLET, CHEWABLE ORAL at 11:06

## 2022-06-24 RX ADMIN — POTASSIUM CHLORIDE 40 MEQ: 1500 TABLET, EXTENDED RELEASE ORAL at 08:06

## 2022-06-24 RX ADMIN — ENOXAPARIN SODIUM 40 MG: 40 INJECTION SUBCUTANEOUS at 04:06

## 2022-06-24 RX ADMIN — ALBUTEROL SULFATE 2.5 MG: 2.5 SOLUTION RESPIRATORY (INHALATION) at 08:06

## 2022-06-24 RX ADMIN — ALBUTEROL SULFATE 2.5 MG: 2.5 SOLUTION RESPIRATORY (INHALATION) at 11:06

## 2022-06-24 RX ADMIN — SIMETHICONE 80 MG: 80 TABLET, CHEWABLE ORAL at 05:06

## 2022-06-24 RX ADMIN — LATANOPROST 1 DROP: 50 SOLUTION OPHTHALMIC at 08:06

## 2022-06-24 RX ADMIN — ALBUTEROL SULFATE 2.5 MG: 2.5 SOLUTION RESPIRATORY (INHALATION) at 12:06

## 2022-06-24 RX ADMIN — SIMETHICONE 80 MG: 80 TABLET, CHEWABLE ORAL at 04:06

## 2022-06-24 NOTE — PLAN OF CARE
NIPPV settings noted in Dr. Cano's progress note and order placed.  Order sent to [facility] via [facility] and liaison Low notified via voicemail.        06/24/22 0840   Post-Acute Status   Post-Acute Authorization HME;Hospice   HME Status Referrals Sent   Hospice Status Pending equipment/medication delivery

## 2022-06-24 NOTE — PLAN OF CARE
Spoke with Low at [facility] who states they have received the NIPPV orders and are actively working on getting everything arranged.        06/24/22 1515   Post-Acute Status   Post-Acute Authorization Hospice   Hospice Status Pending equipment/medication delivery

## 2022-06-24 NOTE — PT/OT/SLP PROGRESS
Physical Therapy Treatment    Patient Name:  Estelle Cast   MRN:  [id number]    Recommendations:     Discharge Recommendations:  home health PT   Discharge Equipment Recommendations: walker, rolling   Barriers to discharge: increased assist with mobility    Assessment:     Estelle Cast is a 56 y.o. female admitted with a medical diagnosis of Chronic respiratory failure.  She presents with the following impairments/functional limitations:  weakness, impaired endurance, impaired self care skills, gait instability, impaired balance, impaired cardiopulmonary response to activity.  Pt agreeable to visit. Pt c/o pain in tailbone, secondary to sitting up for a while. Pt eager to ambulate to stretch out her legs and move around. Pt c/o unsteadiness due to possible ear infection affecting her balance. Pt ambulated 450' with no AD and SBA to CGA secondary to two episodes of LOB that pt was able to correct. Pt required several standing rest breaks due to feeling unsteady. Pt on nc 4 L/m O2 with sats >95% throughout session.    Rehab Prognosis: Fair; patient would benefit from acute skilled PT services to address these deficits and reach maximum level of function.    Recent Surgery: * No surgery found *      Plan:     During this hospitalization, patient to be seen 6 x/week to address the identified rehab impairments via gait training, therapeutic activities, therapeutic exercises and progress toward the following goals:    · Plan of Care Expires:  07/20/22    Subjective     Chief Complaint: unsteadiness due to possible ear infection  Patient/Family Comments/goals: to return home  Pain/Comfort:  · Pain Rating 1: other (see comments) (not rated)  · Location - Orientation 1: generalized  · Location 1: coccyx (from sitting for extended period)  · Pain Addressed 1: Reposition, Distraction  · Pain Rating Post-Intervention 1: 0/10      Objective:     Communicated with RN prior to session.  Patient found up in chair with  oxygen, peripheral IV, PICC line, pulse ox (continuous), telemetry upon PT entry to room.     General Precautions: Standard, fall   Orthopedic Precautions:N/A   Braces:    Respiratory Status: High flow, flow 4 L/min, concentration  %     Functional Mobility:  · Transfers:     · Sit to Stand:  supervision with no AD  · Gait: x 450' with SBA to CGA secondary to unsteadiness and two episodes of LOB that pt was able to correct. Several short standing rest breaks due to unsteadiness.      AM-PAC 6 CLICK MOBILITY          Therapeutic Activities and Exercises:   Pt educated on importance of time OOB, importance of intermittent mobility, safe techniques for transfers/ambulation, discharge recommendations/options, and use of call light for assistance and fall prevention.      Patient left up in chair with all lines intact, call button in reach and Rn notified..    GOALS:   Multidisciplinary Problems     Physical Therapy Goals        Problem: Physical Therapy    Goal Priority Disciplines Outcome Goal Variances Interventions   Physical Therapy Goal     PT, PT/OT      Description: Goals to be met by: 2022     Patient will increase functional independence with mobility by performin. Supine to sit Independent  2. Sit to stand transfer Independent  3. Bed to chair transfer Independent  4. Gait  x 250 feet Independent                    Multidisciplinary Problems (Resolved)        Problem: Physical Therapy    Goal Priority Disciplines Outcome Goal Variances Interventions   Physical Therapy Goal   (Resolved)     PT, PT/OT Met     Description: Goals to be met by: 2022     Patient will increase functional independence with mobility by performin. Supine to sit with Supervision  2. Sit to stand transfer with Supervision  3. Bed to chair transfer with Supervision using Rolling Walker  4. Gait  x 75 feet with Supervision using Rolling Walker.                          Time Tracking:     PT Received On: 22  PT  Start Time: 1026     PT Stop Time: 1050  PT Total Time (min): 24 min     Billable Minutes: Gait Training 24    Treatment Type: Treatment  PT/PTA: PTA     PTA Visit Number: 1 06/24/2022

## 2022-06-24 NOTE — RESPIRATORY THERAPY
06/23/22 2022   Patient Assessment/Suction   Level of Consciousness (AVPU) alert   Respiratory Effort Normal;Unlabored   Expansion/Accessory Muscles/Retractions no use of accessory muscles   All Lung Fields Breath Sounds equal bilaterally;diminished   RUL Breath Sounds wheezes, expiratory   Rhythm/Pattern, Respiratory unlabored   PRE-TX-O2   O2 Device (Oxygen Therapy) High Flow nasal Cannula   Flow (L/min) 3   SpO2 (!) 93 %   Pulse Oximetry Type Continuous   $ Pulse Oximetry - Multiple Charge Pulse Oximetry - Multiple   Pulse 80   Resp 20   Aerosol Therapy   $ Aerosol Therapy Charges Aerosol Treatment   Daily Review of Necessity (SVN) completed   Respiratory Treatment Status (SVN) given   Treatment Route (SVN) mask   Patient Position (SVN) semi-Akers's   Post Treatment Assessment (SVN) breath sounds unchanged   Signs of Intolerance (SVN) none   Breath Sounds Post-Respiratory Treatment   Post-treatment Heart Rate (beats/min) 81   Post-treatment Resp Rate (breaths/min) 18   Preset CPAP/BiPAP Settings   Mode Of Delivery BiPAP;Standby   Education   $ Education Bronchodilator;15 min   Respiratory Evaluation   $ Care Plan Tech Time 15 min   $ Eval/Re-eval Charges Re-evaluation   Evaluation For New Orders

## 2022-06-24 NOTE — PLAN OF CARE
Problem: Adjustment to Illness COPD (Chronic Obstructive Pulmonary Disease)  Goal: Optimal Chronic Illness Coping  Outcome: Ongoing, Progressing     Problem: Functional Ability Impaired COPD (Chronic Obstructive Pulmonary Disease)  Goal: Optimal Level of Functional [place]  Outcome: Ongoing, Progressing     Problem: Infection COPD (Chronic Obstructive Pulmonary Disease)  Goal: Absence of Infection Signs and Symptoms  Outcome: Ongoing, Progressing     Problem: Oral Intake Inadequate COPD (Chronic Obstructive Pulmonary Disease)  Goal: Improved Nutrition Intake  Outcome: Ongoing, Progressing     Problem: Respiratory Compromise COPD (Chronic Obstructive Pulmonary Disease)  Goal: Effective Oxygenation and Ventilation  Outcome: Ongoing, Progressing     Problem: Adult Inpatient Plan of Care  Goal: Plan of Care Review  Outcome: Ongoing, Progressing  Goal: Patient-Specific Goal (Individualized)  Outcome: Ongoing, Progressing  Goal: Absence of Hospital-Acquired Illness or Injury  Outcome: Ongoing, Progressing  Goal: Optimal Comfort and Wellbeing  Outcome: Ongoing, Progressing  Goal: Readiness for Transition of Care  Outcome: Ongoing, Progressing     Problem: Infection  Goal: Absence of Infection Signs and Symptoms  Outcome: Ongoing, Progressing     Problem: Fluid Imbalance (Pneumonia)  Goal: Fluid Balance  Outcome: Ongoing, Progressing     Problem: Infection (Pneumonia)  Goal: Resolution of Infection Signs and Symptoms  Outcome: Ongoing, Progressing     Problem: Respiratory Compromise (Pneumonia)  Goal: Effective Oxygenation and Ventilation  Outcome: Ongoing, Progressing     Problem: Communication Impairment (Mechanical Ventilation, Invasive)  Goal: Effective Communication  Outcome: Ongoing, Progressing     Problem: Device-Related Complication Risk (Mechanical Ventilation, Invasive)  Goal: Optimal Device Function  Outcome: Ongoing, Progressing     Problem: Inability to Wean (Mechanical Ventilation, Invasive)  Goal:  Mechanical Ventilation Liberation  Outcome: Ongoing, Progressing     Problem: Nutrition Impairment (Mechanical Ventilation, Invasive)  Goal: Optimal Nutrition Delivery  Outcome: Ongoing, Progressing     Problem: Skin and Tissue Injury (Mechanical Ventilation, Invasive)  Goal: Absence of Device-Related Skin and Tissue Injury  Outcome: Ongoing, Progressing     Problem: Ventilator-Induced Lung Injury (Mechanical Ventilation, Invasive)  Goal: Absence of Ventilator-Induced Lung Injury  Outcome: Ongoing, Progressing     Problem: Communication Impairment (Artificial Airway)  Goal: Effective Communication  Outcome: Ongoing, Progressing     Problem: Device-Related Complication Risk (Artificial Airway)  Goal: Optimal Device Function  Outcome: Ongoing, Progressing     Problem: Skin and Tissue Injury (Artificial Airway)  Goal: Absence of Device-Related Skin or Tissue Injury  Outcome: Ongoing, Progressing     Problem: Noninvasive Ventilation Acute  Goal: Effective Unassisted Ventilation and Oxygenation  Outcome: Ongoing, Progressing     Problem: Skin Injury Risk Increased  Goal: Skin Health and Integrity  Outcome: Ongoing, Progressing     Problem: Fall Injury Risk  Goal: Absence of Fall and Fall-Related Injury  Outcome: Ongoing, Progressing     Problem: Parenteral Nutrition  Goal: Effective Intravenous Nutrition Therapy Delivery  Outcome: Ongoing, Progressing

## 2022-06-24 NOTE — PLAN OF CARE
06/24/22 0750   Patient Assessment/Suction   Level of Consciousness (AVPU) alert   Respiratory Effort Normal;Unlabored   All Lung Fields Breath Sounds wheezes, expiratory   PRE-TX-O2   O2 Device (Oxygen Therapy) High Flow nasal Cannula   $ Is the patient on Low Flow Oxygen? Yes   Flow (L/min) 4   SpO2 98 %   Pulse Oximetry Type Continuous   $ Pulse Oximetry - Multiple Charge Pulse Oximetry - Multiple   Pulse 83   Resp 18   Aerosol Therapy   $ Aerosol Therapy Charges Aerosol Treatment   Daily Review of Necessity (SVN) completed   Respiratory Treatment Status (SVN) given   Treatment Route (SVN) in-line;mask   Patient Position (SVN) HOB elevated   Post Treatment Assessment (SVN) breath sounds improved   Signs of Intolerance (SVN) none   Inhaler   $ Inhaler Charges MDI (Metered Dose [place]ahler) Treatment   Daily Review of Necessity (Inhaler) completed   Respiratory Treatment Status (Inhaler) given   Treatment Route (Inhaler) mouthpiece   Patient Position (Inhaler) HOB elevated   Post Treatment Assessment (Inhaler) breath sounds unchanged   Signs of Intolerance (Inhaler) none   Skin Integrity   $ Wound Care Tech Time 15 min   Area Observed Bridge of nose   Skin Appearance without discoloration   Barrier used? Gel Cushion   Preset CPAP/BiPAP Settings   Mode Of Delivery Standby   $ CPAP/BiPAP Daily Charge BiPAP/CPAP Daily   Equipment Type V60   Ipap 14   EPAP (cm H2O) 6   Pressure Support (cm H2O) 8   Set Rate (Breaths/Min) 20   ITime (sec) 1   Rise Time (sec) 3   Education   $ Education Bronchodilator;15 min   Respiratory Evaluation   $ Care Plan Tech Time 15 min

## 2022-06-24 NOTE — PLAN OF CARE
Plan remains for home hospice with [facility], but they are having difficulty arranging BiPap at this time.  Awaiting updated settings orders from Dr. Lim.  Will continue to follow.        06/24/22 5821   Discharge Reassessment   Assessment Type Discharge Planning Reassessment   Did the patient's condition or plan change since previous assessment? No   Discharge Plan A Hospice/home   Discharge Barriers Identified None   Post-Acute Status   Hospice Status Pending equipment/medication delivery   Discharge Delays (!) Home Medical Equipment (Insurance, Delivery)

## 2022-06-24 NOTE — PROGRESS NOTES
Progress Note  PULMONARY    Admit Date: 6/17/2022 06/24/2022  History of Present Illness:  Pt is a 57 yo female with COPD, malnutrition who presented to ED with resp distress and experienced code blue after arrival requiring 10min CPR, mag for torsades then had ROSC, intubated and was waking up and responsive. Pt admitted to ICU and pulmonary is consulted for further management/recommendations.  Further history is obtained over the phone with pt's sister and RAMIREZ Cassidy. She states pt was living in [place] where she had a pulmonologist caring for her, had very poor health with frequent admissions lately for COPD with CO2 retention (10 times so far this year) and moved to [place] to stay w/ sister 4 days ago. She developed acute on chronic abdominal pain and distention which made it difficult for her to breathe then had difficulty keeping sats up with home O2- uses 3L continuously. She was supposed to have NIV for home but had difficulty tolerating it and then difficulty getting it in [place]. Pt has had SBO requiring bowel resection 12/2021 and has had complications with abd pain, trouble eating off and on since then. She is noted to have SBO on this admission and surgery is consulted for further recommendations. Pt is a current smoker and wants to quit.       SUBJECTIVE:     6/19- pt waking up and writing notes to staff, continues on vent. cpap trial passed    6/20/2022 - Stable overnight but has remained on BiPAP.  In no distress.  She is anxious.  She shows very little insight into her overall health condition.  No new issues reported.  Had small BM yesterday but does feel that her abdomen is distended.  She is on Clinimix - to have PICC line placed (does have R CVL)    6/21/2022 - Stable overnight and has tolerated NC O2, using BiPAP when sleeping.  Tolerating liquids and is on TPN.  No new issues reported.  Remains weak and frail overall.  EKG noted this AM (cardiology following).    6/22/2022 - Stable  overnight, waiting on room availability to transfer out of ICU.  No new complaints.  She is considering possible DC with hospice but when I discussed code status she would want to be intubated and is undecided about whether she would want resuscitation.  We discussed this but about half way through the conversation she did not participate any more.  No new issues reported.    6/23/2022 - Stable overnight, sitting up in chair and is more interactive.  She is depressed.  We had long discussion about her lung and cardiac disease, cigarette use, diet, etc.  She has been able to ambulate but still feels weak.    6/24/2022 - Stable overnight and current plan is to DC home with hospice, she was sleeping when I visited and I did not waken her.  No new issues reported.    NIPPV Order    Ordering noninvasive positive pressure volume ventilator for continuous use and PRN to reduce the risk of exacerbation and possible hospitalization.  Home BiPAP is insufficient due to the severity of the condition.  COPD/emphysema is the cause of the chronic respiratory failure/hypercapnea.        OBJECTIVE:     Vitals (Most recent):  Vitals:    06/24/22 0750   BP:    Pulse: 83   Resp: 18   Temp:        Vitals (24 hour range):  Temp:  [97.1 °F (36.2 °C)-98.8 °F (37.1 °C)]   Pulse:  [72-92]   Resp:  [15-26]   BP: (103-173)/()   SpO2:  [93 %-100 %]       Intake/Output Summary (Last 24 hours) at 6/24/2022 0812  Last data filed at 6/24/2022 0300  Gross per 24 hour   Intake 840 ml   Output 1590 ml   Net -750 ml          Physical Exam:  The patient's neuro status (alertness,orientation,cognitive function,motor skills,), pharyngeal exam (oral lesions, hygiene, abn dentition,), Neck (jvd,mass,thyroid,nodes in neck and above/below clavicle),RESPIRATORY(symmetry,effort,fremitus,percussion,auscultation),  Cor(rhythm,heart tones including gallops,perfusion,edema)ABD(distention,hepatic&splenomegaly,tenderness,masses),  Skin(rash,cyanosis),Psyc(affect,judgement,).  Exam negative except for these pertinent findings:      Awake, alert, on BiPAP  Clear but diminished breath sounds, + Ansari's sign  Abdomen is soft, nontender, BS+ and more active  No edema  Thin, cachectic   Bruising bilat arms    Radiographs reviewed: view by direct vision   CXR 6/19- hyperinflated, no significant change    Labs     No results for input(s): WBC, HGB, HCT, PLT, BAND, METAMYELOCYT, MYELOPCT, HGBA1C in the last 24 hours.  No results for input(s): NA, K, CL, CO2, BUN, CREATININE, GLU, CALCIUM, CAION, MG, PHOS, AST, ALT, ALKPHOS, BILITOT, BILIDIR, PROT, ALBUMIN, PREALBUMIN, AMYLASE, LIPASE, CRP, HSCRP, SEDRATE, PROCAL, INR, PTT, LABHEPA, LACTATE, TROPONINI, CPK, CPKMB, MB, BNP in the last 24 hours.  No results for input(s): PH, PCO2, PO2, HCO3 in the last 24 hours.  Microbiology Results (last 7 days)     Procedure Component Value Units Date/Time    Blood culture #1 **CANNOT BE ORDERED STAT** [[id number]] Collected: 06/17/22 1307    Order Status: Completed Specimen: Blood from Peripheral, Antecubital, Left Updated: 06/22/22 1432     Blood Culture, Routine No growth after 5 days.    Blood culture #2 **CANNOT BE ORDERED STAT** [[id number]] Collected: 06/17/22 1320    Order Status: Completed Specimen: Blood from Peripheral, Antecubital, Right Updated: 06/22/22 1432     Blood Culture, Routine No growth after 5 days.    Culture, Respiratory with Gram Stain [[id number]] Collected: 06/18/22 1535    Order Status: Completed Specimen: Respiratory from Tracheal Aspirate Updated: 06/20/22 0655     Respiratory Culture No normal respiratory mane      No growth     Gram Stain (Respiratory) Many WBC's     Gram Stain (Respiratory) <10 epithelial cells per low power field.     Gram Stain (Respiratory) No organisms seen    MRSA Screen by PCR [[id number]] Collected: 06/18/22 0121    Order Status: Completed Specimen: Nasopharyngeal Swab from Nasal Updated: 06/18/22 0358     MRSA  SCREEN BY PCR Negative          Impression:  Active Hospital Problems    Diagnosis  POA    *Acute on chronic respiratory failure requiring mechanical ventilation [J96.10]  Yes    Hypokalemia [E87.6]  No    Acute hypercapnic respiratory failure [J96.02]  Yes    Cardiomyopathy [I42.9]  Yes     Chronic    QT prolongation [R94.31]  Yes     Chronic    VT (ventricular tachycardia) [I47.2]  Clinically Undetermined    BALDEMAR (generalized anxiety disorder) [F41.1]  Yes     Chronic    Severe malnutrition [E43]  Yes     Chronic    COPD (chronic obstructive pulmonary disease) [J44.9]  Yes     Chronic    Anemia [D64.9]  Yes     Chronic    Hyponatremia [E87.1]  No    VT/torsades cardiac arrest with ROSC [I46.9]  Yes     Patient coded in emergency department  Intubated  Admit to ICU  Consult to Critical Care      Right lung pneumonia, likely aspiration [J18.9]  Clinically Undetermined    Smoker [F17.200]  Yes    Abdominal pain [R10.9]  Yes    BMI less than 19,adult [Z68.1]  Not Applicable    Obstructive sleep apnea syndrome [G47.33]  Yes      Resolved Hospital Problems    Diagnosis Date Resolved POA    Possible bowel obstruction  [K56.609] 06/20/2022 Yes    Leucocytosis [D72.829] 06/19/2022 Yes               Plan:     - OK to DC with hospice on:    ICS/LABA/LAMA (ideally TRELEGY but SYMBICORT/Spiriva also OK)  PRN ALBUTEROL (nebulizer and inhaler)  Home O2 3-4 LPM   PO levaquin to complete 7 days total antibiotics  Prednisone taper - (30 x 5, 20 x 5, 10 x 5)  PRN MUCINEX    NO SMOKING    NIPPV settings:     PSmax - 25, range 15-25   PEEP range 5-10   RR - 16   TV goal - 300 - 400 ml   O2 to keep sats 88-94%          Delfino Cano MD  [facility] Pulmonary/Critical Care                                          .

## 2022-06-25 VITALS
HEIGHT: 64 IN | SYSTOLIC BLOOD PRESSURE: 102 MMHG | DIASTOLIC BLOOD PRESSURE: 69 MMHG | RESPIRATION RATE: 18 BRPM | OXYGEN SATURATION: 94 % | HEART RATE: 87 BPM | BODY MASS INDEX: 17.92 KG/M2 | WEIGHT: 104.94 LBS | TEMPERATURE: 98 F

## 2022-06-25 LAB
ALBUMIN SERPL BCP-MCNC: 3 G/DL (ref 3.5–5.2)
ALP SERPL-CCNC: 49 U/L (ref 55–135)
ALT SERPL W/O P-5'-P-CCNC: 15 U/L (ref 10–44)
ANION GAP SERPL CALC-SCNC: 5 MMOL/L (ref 8–16)
AST SERPL-CCNC: 14 U/L (ref 10–40)
BASOPHILS # BLD AUTO: 0.02 K/UL (ref 0–0.2)
BASOPHILS NFR BLD: 0.2 % (ref 0–1.9)
BILIRUB SERPL-MCNC: 0.3 MG/DL (ref 0.1–1)
BUN SERPL-MCNC: 11 MG/DL (ref 6–20)
CALCIUM SERPL-MCNC: 8 MG/DL (ref 8.7–10.5)
CHLORIDE SERPL-SCNC: 85 MMOL/L (ref 95–110)
CO2 SERPL-SCNC: 45 MMOL/L (ref 23–29)
CREAT SERPL-MCNC: 0.4 MG/DL (ref 0.5–1.4)
DIFFERENTIAL METHOD: ABNORMAL
EOSINOPHIL # BLD AUTO: 0.1 K/UL (ref 0–0.5)
EOSINOPHIL NFR BLD: 1.3 % (ref 0–8)
ERYTHROCYTE [DISTWIDTH] IN BLOOD BY AUTOMATED COUNT: 15.2 % (ref 11.5–14.5)
EST. GFR  (AFRICAN AMERICAN): >60 ML/MIN/1.73 M^2
EST. GFR  (NON AFRICAN AMERICAN): >60 ML/MIN/1.73 M^2
GLUCOSE SERPL-MCNC: 84 MG/DL (ref 70–110)
HCT VFR BLD AUTO: 31.2 % (ref 37–48.5)
HGB BLD-MCNC: 9.6 G/DL (ref 12–16)
IMM GRANULOCYTES # BLD AUTO: 0.05 K/UL (ref 0–0.04)
IMM GRANULOCYTES NFR BLD AUTO: 0.5 % (ref 0–0.5)
LYMPHOCYTES # BLD AUTO: 2.3 K/UL (ref 1–4.8)
LYMPHOCYTES NFR BLD: 22.7 % (ref 18–48)
MAGNESIUM SERPL-MCNC: 2 MG/DL (ref 1.6–2.6)
MCH RBC QN AUTO: 28.1 PG (ref 27–31)
MCHC RBC AUTO-ENTMCNC: 30.8 G/DL (ref 32–36)
MCV RBC AUTO: 91 FL (ref 82–98)
MONOCYTES # BLD AUTO: 1 K/UL (ref 0.3–1)
MONOCYTES NFR BLD: 9.8 % (ref 4–15)
NEUTROPHILS # BLD AUTO: 6.5 K/UL (ref 1.8–7.7)
NEUTROPHILS NFR BLD: 65.5 % (ref 38–73)
NRBC BLD-RTO: 0 /100 WBC
PHOSPHATE SERPL-MCNC: 3.6 MG/DL (ref 2.7–4.5)
PLATELET # BLD AUTO: 324 K/UL (ref 150–450)
PMV BLD AUTO: 8.3 FL (ref 9.2–12.9)
POTASSIUM SERPL-SCNC: 4.1 MMOL/L (ref 3.5–5.1)
PROT SERPL-MCNC: 5.5 G/DL (ref 6–8.4)
RBC # BLD AUTO: 3.42 M/UL (ref 4–5.4)
SODIUM SERPL-SCNC: 135 MMOL/L (ref 136–145)
WBC # BLD AUTO: 9.9 K/UL (ref 3.9–12.7)

## 2022-06-25 PROCEDURE — 99900031 HC PATIENT EDUCATION (STAT)

## 2022-06-25 PROCEDURE — 94640 AIRWAY INHALATION TREATMENT: CPT

## 2022-06-25 PROCEDURE — 25000003 PHARM REV CODE 250: Performed by: INTERNAL MEDICINE

## 2022-06-25 PROCEDURE — 80053 COMPREHEN METABOLIC PANEL: CPT | Performed by: INTERNAL MEDICINE

## 2022-06-25 PROCEDURE — 84100 ASSAY OF PHOSPHORUS: CPT | Performed by: INTERNAL MEDICINE

## 2022-06-25 PROCEDURE — 99900035 HC TECH TIME PER 15 MIN (STAT)

## 2022-06-25 PROCEDURE — 27000221 HC OXYGEN, UP TO 24 HOURS

## 2022-06-25 PROCEDURE — S4991 NICOTINE PATCH NONLEGEND: HCPCS | Performed by: INTERNAL MEDICINE

## 2022-06-25 PROCEDURE — 25000242 PHARM REV CODE 250 ALT 637 W/ HCPCS: Performed by: INTERNAL MEDICINE

## 2022-06-25 PROCEDURE — 83735 ASSAY OF MAGNESIUM: CPT | Performed by: INTERNAL MEDICINE

## 2022-06-25 PROCEDURE — 94660 CPAP INITIATION&MGMT: CPT

## 2022-06-25 PROCEDURE — 63600175 PHARM REV CODE 636 W HCPCS: Performed by: INTERNAL MEDICINE

## 2022-06-25 PROCEDURE — 94761 N-INVAS EAR/PLS OXIMETRY MLT: CPT

## 2022-06-25 PROCEDURE — 25000003 PHARM REV CODE 250: Performed by: NURSE PRACTITIONER

## 2022-06-25 PROCEDURE — 85025 COMPLETE CBC W/AUTO DIFF WBC: CPT | Performed by: INTERNAL MEDICINE

## 2022-06-25 RX ORDER — PREDNISONE 10 MG/1
10 TABLET ORAL DAILY
Qty: 30 TABLET | Refills: 0
Start: 2022-06-25 | End: 2022-07-07

## 2022-06-25 RX ADMIN — ALBUTEROL SULFATE 2.5 MG: 2.5 SOLUTION RESPIRATORY (INHALATION) at 08:06

## 2022-06-25 RX ADMIN — ALBUTEROL SULFATE 2.5 MG: 2.5 SOLUTION RESPIRATORY (INHALATION) at 11:06

## 2022-06-25 RX ADMIN — AMIODARONE HYDROCHLORIDE 200 MG: 200 TABLET ORAL at 08:06

## 2022-06-25 RX ADMIN — ASPIRIN 81 MG CHEWABLE TABLET 81 MG: 81 TABLET CHEWABLE at 08:06

## 2022-06-25 RX ADMIN — PREDNISONE 30 MG: 5 TABLET ORAL at 08:06

## 2022-06-25 RX ADMIN — ALBUTEROL SULFATE 2.5 MG: 2.5 SOLUTION RESPIRATORY (INHALATION) at 03:06

## 2022-06-25 RX ADMIN — ALPRAZOLAM 0.25 MG: 0.25 TABLET ORAL at 03:06

## 2022-06-25 RX ADMIN — Medication 400 MG: at 08:06

## 2022-06-25 RX ADMIN — ONDANSETRON 4 MG: 2 INJECTION INTRAMUSCULAR; INTRAVENOUS at 07:06

## 2022-06-25 RX ADMIN — ALPRAZOLAM 0.25 MG: 0.25 TABLET ORAL at 08:06

## 2022-06-25 RX ADMIN — LOSARTAN POTASSIUM 12.5 MG: 25 TABLET, FILM COATED ORAL at 08:06

## 2022-06-25 RX ADMIN — SIMETHICONE 80 MG: 80 TABLET, CHEWABLE ORAL at 11:06

## 2022-06-25 RX ADMIN — OXYCODONE HYDROCHLORIDE AND ACETAMINOPHEN 1 TABLET: 10; 325 TABLET ORAL at 04:06

## 2022-06-25 RX ADMIN — FLUTICASONE FUROATE AND VILANTEROL TRIFENATATE 1 PUFF: 200; 25 POWDER RESPIRATORY (INHALATION) at 08:06

## 2022-06-25 RX ADMIN — LEVOFLOXACIN 750 MG: 750 TABLET, FILM COATED ORAL at 05:06

## 2022-06-25 RX ADMIN — GUAIFENESIN 1200 MG: 600 TABLET, EXTENDED RELEASE ORAL at 08:06

## 2022-06-25 RX ADMIN — TIOTROPIUM BROMIDE INHALATION SPRAY 2 PUFF: 3.12 SPRAY, METERED RESPIRATORY (INHALATION) at 08:06

## 2022-06-25 RX ADMIN — SERTRALINE HYDROCHLORIDE 25 MG: 25 TABLET ORAL at 08:06

## 2022-06-25 RX ADMIN — NICOTINE 1 PATCH: 7 PATCH, EXTENDED RELEASE TRANSDERMAL at 08:06

## 2022-06-25 RX ADMIN — METOPROLOL TARTRATE 25 MG: 25 TABLET, FILM COATED ORAL at 08:06

## 2022-06-25 RX ADMIN — OXYCODONE HYDROCHLORIDE AND ACETAMINOPHEN 1 TABLET: 10; 325 TABLET ORAL at 02:06

## 2022-06-25 RX ADMIN — CYANOCOBALAMIN TAB 1000 MCG 1000 MCG: 1000 TAB at 08:06

## 2022-06-25 RX ADMIN — METOPROLOL TARTRATE 25 MG: 25 TABLET, FILM COATED ORAL at 02:06

## 2022-06-25 RX ADMIN — ALBUTEROL SULFATE 2.5 MG: 2.5 SOLUTION RESPIRATORY (INHALATION) at 12:06

## 2022-06-25 RX ADMIN — SIMETHICONE 80 MG: 80 TABLET, CHEWABLE ORAL at 05:06

## 2022-06-25 NOTE — SUBJECTIVE & OBJECTIVE
Interval History: Pt was unable to obtain a trilogy vent may require bipap    Review of Systems   Constitutional:  Positive for fatigue. Negative for activity change.   HENT: Negative.     Eyes: Negative.    Respiratory:          LAWSON   Cardiovascular: Negative.    Gastrointestinal: Negative.    Endocrine: Positive for heat intolerance.   Genitourinary: Negative.    Musculoskeletal:  Positive for arthralgias and gait problem.   Skin: Negative.    Allergic/Immunologic: Negative.    Neurological:  Positive for weakness.   Hematological:  Bruises/bleeds easily.   Psychiatric/Behavioral:  The patient is nervous/anxious.         Improved   Objective:     Vital Signs (Most Recent):  Temp: 98.1 °F (36.7 °C) (06/24/22 1120)  Pulse: 83 (06/24/22 1555)  Resp: 18 (06/24/22 1555)  BP: 111/69 (06/24/22 1430)  SpO2: (!) 94 % (06/24/22 1555)   Vital Signs (24h Range):  Temp:  [97.1 °F (36.2 °C)-98.8 °F (37.1 °C)] 98.1 °F (36.7 °C)  Pulse:  [72-92] 83  Resp:  [16-26] 18  SpO2:  [93 %-100 %] 94 %  BP: (103-173)/() 111/69     Weight: 47.6 kg (104 lb 15 oz)  Body mass index is 18.01 kg/m².    Intake/Output Summary (Last 24 hours) at 6/24/2022 1903  Last data filed at 6/24/2022 1339  Gross per 24 hour   Intake 120 ml   Output 240 ml   Net -120 ml      Physical Exam  Constitutional:       General: She is not in acute distress.     Appearance: She is ill-appearing.   HENT:      Head: Normocephalic and atraumatic.      Nose: Nose normal.      Mouth/Throat:      Mouth: Mucous membranes are moist.   Eyes:      Extraocular Movements: Extraocular movements intact.      Pupils: Pupils are equal, round, and reactive to light.   Cardiovascular:      Rate and Rhythm: Normal rate and regular rhythm.   Pulmonary:      Effort: Pulmonary effort is normal.      Comments: Diminished breath sounds  Abdominal:      General: There is distension.      Tenderness: There is no abdominal tenderness. There is no guarding or rebound.   Musculoskeletal:          General: Normal range of motion.      Cervical back: Normal range of motion and neck supple.      Comments: Much diminished muscle mass   Skin:     General: Skin is warm.   Neurological:      Mental Status: She is alert and oriented to person, place, and time.   Psychiatric:      Comments: Much improved mood and affect       Significant Labs: All pertinent labs within the past 24 hours have been reviewed.  Recent Lab Results       None            Significant Imaging: I have reviewed all pertinent imaging results/findings within the past 24 hours.

## 2022-06-25 NOTE — PLAN OF CARE
Problem: Adjustment to Illness COPD (Chronic Obstructive Pulmonary Disease)  Goal: Optimal Chronic Illness Coping  Outcome: Ongoing, Progressing     Problem: Functional Ability Impaired COPD (Chronic Obstructive Pulmonary Disease)  Goal: Optimal Level of Functional [place]  Outcome: Ongoing, Progressing     Problem: Infection COPD (Chronic Obstructive Pulmonary Disease)  Goal: Absence of Infection Signs and Symptoms  Outcome: Ongoing, Progressing     Problem: Oral Intake Inadequate COPD (Chronic Obstructive Pulmonary Disease)  Goal: Improved Nutrition Intake  Outcome: Ongoing, Progressing     Problem: Respiratory Compromise COPD (Chronic Obstructive Pulmonary Disease)  Goal: Effective Oxygenation and Ventilation  Outcome: Ongoing, Progressing     Problem: Adult Inpatient Plan of Care  Goal: Plan of Care Review  Outcome: Ongoing, Progressing  Goal: Patient-Specific Goal (Individualized)  Outcome: Ongoing, Progressing  Goal: Absence of Hospital-Acquired Illness or Injury  Outcome: Ongoing, Progressing  Goal: Optimal Comfort and Wellbeing  Outcome: Ongoing, Progressing  Goal: Readiness for Transition of Care  Outcome: Ongoing, Progressing     Problem: Infection  Goal: Absence of Infection Signs and Symptoms  Outcome: Ongoing, Progressing     Problem: Fluid Imbalance (Pneumonia)  Goal: Fluid Balance  Outcome: Ongoing, Progressing     Problem: Infection (Pneumonia)  Goal: Resolution of Infection Signs and Symptoms  Outcome: Ongoing, Progressing     Problem: Respiratory Compromise (Pneumonia)  Goal: Effective Oxygenation and Ventilation  Outcome: Ongoing, Progressing     Problem: Communication Impairment (Mechanical Ventilation, Invasive)  Goal: Effective Communication  Outcome: Ongoing, Progressing     Problem: Device-Related Complication Risk (Mechanical Ventilation, Invasive)  Goal: Optimal Device Function  Outcome: Ongoing, Progressing     Problem: Inability to Wean (Mechanical Ventilation, Invasive)  Goal:  Mechanical Ventilation Liberation  Outcome: Ongoing, Progressing     Problem: Nutrition Impairment (Mechanical Ventilation, Invasive)  Goal: Optimal Nutrition Delivery  Outcome: Ongoing, Progressing     Problem: Skin and Tissue Injury (Mechanical Ventilation, Invasive)  Goal: Absence of Device-Related Skin and Tissue Injury  Outcome: Ongoing, Progressing     Problem: Ventilator-Induced Lung Injury (Mechanical Ventilation, Invasive)  Goal: Absence of Ventilator-Induced Lung Injury  Outcome: Ongoing, Progressing     Problem: Communication Impairment (Artificial Airway)  Goal: Effective Communication  Outcome: Ongoing, Progressing     Problem: Device-Related Complication Risk (Artificial Airway)  Goal: Optimal Device Function  Outcome: Ongoing, Progressing     Problem: Skin and Tissue Injury (Artificial Airway)  Goal: Absence of Device-Related Skin or Tissue Injury  Outcome: Ongoing, Progressing     Problem: Noninvasive Ventilation Acute  Goal: Effective Unassisted Ventilation and Oxygenation  Outcome: Ongoing, Progressing     Problem: Skin Injury Risk Increased  Goal: Skin Health and Integrity  Outcome: Ongoing, Progressing     Problem: Fall Injury Risk  Goal: Absence of Fall and Fall-Related Injury  Outcome: Ongoing, Progressing     Problem: Parenteral Nutrition  Goal: Effective Intravenous Nutrition Therapy Delivery  Outcome: Ongoing, Progressing

## 2022-06-25 NOTE — PROGRESS NOTES
Critical access hospital Medicine  Progress Note    Patient Name: Estelle Cast  MRN: [id number]  Patient Class: IP- Inpatient   Admission Date: 6/17/2022  Length of Stay: 7 days  Attending Physician: Ben Leung MD  Primary Care Provider: Primary Doctor No    This is the 6/23 2022 note    Subjective:     Principal Problem:Chronic respiratory failure        HPI:  56-year-old with past medical history significant for COPD, BMI 14.8, depression, generalized anxiety, DANIS, extensive smoking history presenting with worsening shortness breath.  At baseline she uses 3 L it was reported that her oxygen was in the low 80s.  Minimally productive cough.  Chronic diarrhea unchanged from baseline.  No fevers, chills, nausea, vomiting, sick contacts.  No chest pain.  Patient has history of chronic abdominal pain from bowel resection from severe sepsis episode in the past.  On presentation she had been complaining of some upper abdominal pain that was mild.  Family recently relocated from [place].    At time of my evaluation the patient had been intubated for worsening shortness of breath and acute respiratory decompensation with code.  Per ED physician verbal report, patient had gone into torsades and subsequently V-tach.  CPR, epinephrine, amiodarone, shock, and magnesium were given.  Patient responded and is now intubated.  Approximate time of code was less than 10 minutes.  She is currently on Levophed drip.  She is alert and able to give the thumbs-up sign.  My history is limited to report from emergency department.    ED lab significant for WBC 18, Na 134, CO2 48, creatinine 0.3, BNP 60.  Troponins negative.  Lactic acid negative.  Procalcitonin negative.    Chest x-ray significant for tiny interstitial opacity and right upper lobe and right lower lung base.  Lungs are hyperexpanded consistent with emphysema.    In the ED she received a dose of the azithromycin, ceftriaxone, and later meropenem after she  was intubated.  She is also received a dose of Solu-Medrol and DuoNeb treatments.        Overview/Hospital Course:  Assumed care of this patient on 6/18/22.  Patient recently relocated to [place] to live with sister from [place].  Known history of COPD, chronic respiratory failure on 3 L home oxygen, continued tobacco use.  States in December needed emergent surgery, was taken to [facility], had abdominal resection, since then she is followed by surgery and Gastroenterology, chronic abdominal pain, intermittent worsening, loose stools and as per report seems she was placed on b.i.d. Questran.  Initially presented to the ED due to worsening abdominal pain.  Subsequently with worsening respiratory status, cough productive of greenish phlegm, cardiopulmonary arrest (reported torsades with VT after azithromycin and EKG with QT prolongation), status post ROSC, intubated and transferred to the ICU.  Chest x-ray with concern for right lung pneumonia, likely aspiration.  CT abdomen with concern for large stool burden large bowel and concern for partial colonic obstruction.  Patient is severely malnourished.  On 06/18 remains orally intubated but awake, alert, communicating via writing, copious amounts of phlegm production, denies any abdominal pain, states she is not passing flatus, no bowel movements today.  On 06/19, EKG with continued QTC prolongation, echo now with EF of 25% with grade 1 diastolic dysfunction, started on metoprolol, cardiology following.  Plan is attempted extubation to BiPAP.  PICC line when able to start TPN.    6/21/2022  Ms Cast is sitting up and feeding herself in a chair. She notes improved SOB but continued GONZALEZ. I want to have s decent life with the time I have left.     6/22/2022  Pt states that she is feeling better than yesterday with reduced sob, gonzalez and orthopnea. She has decided to be a hospice patient at home.    6/23/2022  Ms Cast has is depressed and I have spoken to  her Re DNR status and no indication at this point for a life vest          Interval History: Ms Cast is depressed without suicidal or violent ideation. She must decide Re hospice or active care    Review of Systems   Constitutional:  Positive for activity change, diaphoresis and fatigue.   HENT: Negative.     Eyes: Negative.    Respiratory:  Positive for cough, chest tightness and shortness of breath.         LAWSON   Cardiovascular:  Positive for palpitations.   Gastrointestinal: Negative.    Endocrine: Positive for heat intolerance.   Genitourinary: Negative.    Musculoskeletal:  Positive for arthralgias, gait problem and myalgias.   Skin: Negative.    Allergic/Immunologic: Negative.    Neurological:  Positive for weakness.   Hematological:  Bruises/bleeds easily.   Psychiatric/Behavioral:  Positive for dysphoric mood. The patient is nervous/anxious.    Objective:     Vital Signs (Most Recent):  Temp: 98.3 °F (36.8 °C) (06/23/22 1153)  Pulse: 86 (06/23/22 1153)  Resp: 16 (06/23/22 1153)  BP: 113/78 (06/23/22 1153)  SpO2: 100 % (06/23/22 1153) Vital Signs (24h Range):  Temp:  [97.3 °F (36.3 °C)-98.5 °F (36.9 °C)] 98.3 °F (36.8 °C)  Pulse:  [] 86  Resp:  [15-22] 16  SpO2:  [91 %-100 %] 100 %  BP: (101-127)/(66-85) 113/78     Weight: 47.6 kg (104 lb 15 oz)  Body mass index is 18.01 kg/m².    Intake/Output Summary (Last 24 hours) at 6/23/2022 1301  Last data filed at 6/23/2022 1202  Gross per 24 hour   Intake 2690.86 ml   Output 3450 ml   Net -759.14 ml      Physical Exam  Vitals and nursing note reviewed.   Constitutional:       General: She is not in acute distress.     Appearance: She is ill-appearing. She is not diaphoretic.   HENT:      Head: Normocephalic and atraumatic.      Nose: Nose normal.      Mouth/Throat:      Mouth: Mucous membranes are moist.   Eyes:      Extraocular Movements: Extraocular movements intact.      Pupils: Pupils are equal, round, and reactive to light.   Cardiovascular:      Rate  and Rhythm: Normal rate and regular rhythm.   Pulmonary:      Effort: Pulmonary effort is normal.      Breath sounds: Normal breath sounds.   Abdominal:      General: Bowel sounds are normal. There is no distension.      Tenderness: There is no abdominal tenderness. There is no guarding or rebound.   Musculoskeletal:         General: Normal range of motion.      Cervical back: Normal range of motion.   Skin:     General: Skin is warm.   Neurological:      Mental Status: She is alert and oriented to person, place, and time.   Psychiatric:      Comments: Depressed/dysphoric mood       Significant Labs: All pertinent labs within the past 24 hours have been reviewed.  Recent Lab Results       None            Significant Imaging: I have reviewed all pertinent imaging results/findings within the past 24 hours.      Assessment/Plan:      * Acute on chronic respiratory failure requiring mechanical ventilation  Intubated in the ED. On 06/19 extubated to BiPAP  P.r.n. ABG and chest x-ray  Pulmonary following    Pt has improved respiratory status but has a poor overall prognosis. She is strongly considering hospice care    Hypokalemia  Potassium 3.3, replaced as per sliding scale to keep greater than 4    QT prolongation  With torsades and VT in the ED  Avoid multiple QTC prolonging medications, checking EKG daily-QTC this morning 495  Keep potassium greater than 4, magnesium greater than 2      Cardiomyopathy  Echo this admission with EF of 25% with grade 1 diastolic dysfunction  Denies any previous known history of heart disease  On 06/18 started on metoprolol, adjust goal-directed therapy as able-with up trending blood pressure will discussed with Cardiology ACE versus Entresto  Will need left heart catheterization/further evaluation as per Cardiology  Monitor for signs of volume overload  Appreciate cardiology input      Hyponatremia  Optimizing nutrition as able and following      Anemia  Chronic, no evidence of active  bleeding, monitoring      COPD (chronic obstructive pulmonary disease)  Respiratory status remains very tenuous and at high risk for re-intubation  Continue scheduled DuoNebs, increase IV steroids 40 q.6  Suspect severe COPD    6/21/2022  Ms Cast notes reduced SOB but continued LAWSON  Her overall prognosis is very poor  I believe that she is stable for transfer to cardio A  Pulmonology input appreciated  Case discussed with Pt and sister  Consider SNF placement    Severe malnutrition  Chronic issue.  There was concern for bowel obstruction, surgery following, KUB this morning with nonobstructive bowel gas pattern  Central line in place, start TPN, consult for PICC line    BALDEMAR (generalized anxiety disorder)  Continue p.r.n. benzodiazepine and monitoring      VT (ventricular tachycardia)  QTC prolongation with torsades and ventricular tachycardia.  No further episodes.  Started on metoprolol.  Continuous cardiac monitoring      VT/torsades cardiac arrest with ROSC  Patient had ROSC, see QT and cardiomyopathy        Smoker  Nicotine patch in place.  Continue to reinforce smoking cessation    Right lung pneumonia, likely aspiration  Respiratory sample with no pathogens but radiological evidence of right lung pneumonia and suspect aspiration  Continue meropenem, adjust as needed      Obstructive sleep apnea syndrome  Has had multiple admissions for hypercapnic respiratory failure as per report  Reportedly has NIP VV at home  Continue BiPAP      Abdominal pain  Acute on chronic  CT is concerning for bowel obstruction with large stool burden, seen by General surgery and given enema on 06/19  KUB 6/20 nonobstructive bowel gas pattern  Trial of clear liquid diet and serial abdominal examination        BMI less than 19,adult  See malnutrition      VTE Risk Mitigation (From admission, onward)         Ordered     enoxaparin injection 40 mg  Daily         06/17/22 8249     Place sequential compression device  Until  discontinued         06/17/22 2317     IP VTE HIGH RISK PATIENT  Once         06/17/22 2317     Reason for no Mechanical VTE Prophylaxis  Once        Question:  Reasons:  Answer:  Physician Provided (leave comment)    06/17/22 2317                Discharge Planning   LIZANDRO: 6/24/2022     Code Status: Full Code   Is the patient medically ready for discharge?:     Reason for patient still in hospital (select all that apply): Treatment, Consult recommendations and Pending disposition  Discharge Plan A: Hospice/home   Discharge Delays: (!) Home Medical Equipment (Insurance, Delivery)              Ben Leung MD  Department of Hospital Medicine   [facility]

## 2022-06-25 NOTE — PLAN OF CARE
06/25/22 0808   Patient Assessment/Suction   Level of Consciousness (AVPU) alert   Respiratory Effort Normal;Unlabored   All Lung Fields Breath Sounds diminished;clear   PRE-TX-O2   O2 Device (Oxygen Therapy) High Flow nasal Cannula   $ Is the patient on Low Flow Oxygen? Yes   Flow (L/min) 3   SpO2 98 %   Pulse Oximetry Type Continuous   $ Pulse Oximetry - Multiple Charge Pulse Oximetry - Multiple   Pulse 80   Resp 18   Aerosol Therapy   $ Aerosol Therapy Charges Aerosol Treatment   Daily Review of Necessity (SVN) completed   Respiratory Treatment Status (SVN) given   Treatment Route (SVN) mask;oxygen   Patient Position (SVN) sitting in chair   Post Treatment Assessment (SVN) breath sounds unchanged   Signs of Intolerance (SVN) none   Inhaler   $ Inhaler Charges MDI (Metered Dose Inahler) Treatment   Daily Review of Necessity (Inhaler) completed   Respiratory Treatment Status (Inhaler) given;mouth rinsed post treatment   Treatment Route (Inhaler) mouthpiece   Patient Position (Inhaler) sitting in chair   Post Treatment Assessment (Inhaler) breath sounds unchanged   Signs of Intolerance (Inhaler) none   Skin Integrity   $ Wound Care Tech Time 15 min   Area Observed Bridge of nose   Skin Appearance without discoloration   Barrier used? Gel Cushion   Preset CPAP/BiPAP Settings   Mode Of Delivery Standby   $ CPAP/BiPAP Daily Charge BiPAP/CPAP Daily   $ Initial CPAP/BiPAP Setup? No   Equipment Type V60   Ipap 14   EPAP (cm H2O) 6   Pressure Support (cm H2O) 8   Set Rate (Breaths/Min) 20   ITime (sec) 1   Rise Time (sec) 3   CPAP/BiPAP Alarms   High Pressure (cm H2O) 40   Low Pressure (cm H2O) 10   Minute Ventilation (L/Min) 6   High RR (breaths/min) 40   Low RR (breaths/min) 10   Apnea (Sec) 20   Education   $ Education Bronchodilator;15 min   Respiratory Evaluation   $ Care Plan Tech Time 15 min

## 2022-06-25 NOTE — PLAN OF CARE
06/25/22 1621   Final Note   Assessment Type Final Discharge Note   Anticipated Discharge Disposition HospiceHome   What phone number can be called within the next 1-3 days to see how you are doing after discharge? [phone]   Post-Acute Status   Post-Acute Authorization Hospice;HME   HME Status Set-up Complete/Auth obtained   Hospice Status Set-up Complete/Auth obtained   Coverage [facility]   Discharge Delays None known at this time     Pt is discharging home with her sister on Hospice with [employer]. Confirmed with both Selin and Mikael at [facility] that HME was delivered and pt dc orders were sent via [facility].     BiPap was delivered per [place] at [facility].     Pt has no further CM needs and is clear to discharge from a CM standpoint.

## 2022-06-25 NOTE — RESPIRATORY THERAPY
06/24/22 2024   Patient Assessment/Suction   Level of Consciousness (AVPU) alert   Respiratory Effort Normal;Unlabored   Expansion/Accessory Muscles/Retractions no use of accessory muscles   All Lung Fields Breath Sounds equal bilaterally;diminished;clear   Rhythm/Pattern, Respiratory unlabored   Cough Frequency frequent   Cough Type fair;nonproductive   PRE-TX-O2   O2 Device (Oxygen Therapy) High Flow nasal Cannula   Flow (L/min) 3   SpO2 96 %   Pulse Oximetry Type Continuous   $ Pulse Oximetry - Multiple Charge Pulse Oximetry - Multiple   Pulse 75   Resp 18   Aerosol Therapy   $ Aerosol Therapy Charges Aerosol Treatment   Daily Review of Necessity (SVN) completed   Respiratory Treatment Status (SVN) given   Treatment Route (SVN) mask   Patient Position (SVN) HOB elevated   Post Treatment Assessment (SVN) breath sounds unchanged   Signs of Intolerance (SVN) none   Breath Sounds Post-Respiratory Treatment   Post-treatment Heart Rate (beats/min) 72   Post-treatment Resp Rate (breaths/min) 18   Education   $ Education Bronchodilator;15 min   Respiratory Evaluation   $ Care Plan Tech Time 15 min   $ Eval/Re-eval Charges Re-evaluation   Evaluation For Re-Eval 5+ day

## 2022-06-25 NOTE — PROGRESS NOTES
[facility] Medicine  Progress Note    Patient Name: Estelle Cast  MRN: [id number]  Patient Class: IP- Inpatient   Admission Date: 6/17/2022  Length of Stay: 7 days  Attending Physician: Ben Leung MD  Primary Care Provider: Primary Doctor No        Subjective:     Principal Problem:Chronic respiratory failure        HPI:  56-year-old with past medical history significant for COPD, BMI 14.8, depression, generalized anxiety, DANIS, extensive smoking history presenting with worsening shortness breath.  At baseline she uses 3 L it was reported that her oxygen was in the low 80s.  Minimally productive cough.  Chronic diarrhea unchanged from baseline.  No fevers, chills, nausea, vomiting, sick contacts.  No chest pain.  Patient has history of chronic abdominal pain from bowel resection from severe sepsis episode in the past.  On presentation she had been complaining of some upper abdominal pain that was mild.  Family recently relocated from [place].    At time of my evaluation the patient had been intubated for worsening shortness of breath and acute respiratory decompensation with code.  Per ED physician verbal report, patient had gone into torsades and subsequently V-tach.  CPR, epinephrine, amiodarone, shock, and magnesium were given.  Patient responded and is now intubated.  Approximate time of code was less than 10 minutes.  She is currently on Levophed drip.  She is alert and able to give the thumbs-up sign.  My history is limited to report from emergency department.    ED lab significant for WBC 18, Na 134, CO2 48, creatinine 0.3, BNP 60.  Troponins negative.  Lactic acid negative.  Procalcitonin negative.    Chest x-ray significant for tiny interstitial opacity and right upper lobe and right lower lung base.  Lungs are hyperexpanded consistent with emphysema.    In the ED she received a dose of the azithromycin, ceftriaxone, and later meropenem after she was intubated.  She is  also received a dose of Solu-Medrol and DuoNeb treatments.        Overview/Hospital Course:  Assumed care of this patient on 6/18/22.  Patient recently relocated to [place] to live with sister from [place].  Known history of COPD, chronic respiratory failure on 3 L home oxygen, continued tobacco use.  States in December needed emergent surgery, was taken to [facility] [place] [facility], had abdominal resection, since then she is followed by surgery and Gastroenterology, chronic abdominal pain, intermittent worsening, loose stools and as per report seems she was placed on b.i.d. Questran.  Initially presented to the ED due to worsening abdominal pain.  Subsequently with worsening respiratory status, cough productive of greenish phlegm, cardiopulmonary arrest (reported torsades with VT after azithromycin and EKG with QT prolongation), status post ROSC, intubated and transferred to the ICU.  Chest x-ray with concern for right lung pneumonia, likely aspiration.  CT abdomen with concern for large stool burden large bowel and concern for partial colonic obstruction.  Patient is severely malnourished.  On 06/18 remains orally intubated but awake, alert, communicating via writing, copious amounts of phlegm production, denies any abdominal pain, states she is not passing flatus, no bowel movements today.  On 06/19, EKG with continued QTC prolongation, echo now with EF of 25% with grade 1 diastolic dysfunction, started on metoprolol, cardiology following.  Plan is attempted extubation to BiPAP.  PICC line when able to start TPN.    6/21/2022  Ms Cast is sitting up and feeding herself in a chair. She notes improved SOB but continued GONZALEZ. I want to have s decent life with the time I have left.     6/22/2022  Pt states that she is feeling better than yesterday with reduced sob, gonzalez and orthopnea. She has decided to be a hospice patient at home.    6/23/2022  Ms Cast has is depressed and I have spoken to her Re DNR status and no  indication at this point for a life vest    /24/2022  Ms Cast feels better on zoloft. She has an improved appetite and mobility with reduced LAWSON. Pt unable to receive a trilogy vent at present.           Interval History: Pt was unable to obtain a trilogy vent may require bipap    Review of Systems   Constitutional:  Positive for fatigue. Negative for activity change.   HENT: Negative.     Eyes: Negative.    Respiratory:          LAWSON   Cardiovascular: Negative.    Gastrointestinal: Negative.    Endocrine: Positive for heat intolerance.   Genitourinary: Negative.    Musculoskeletal:  Positive for arthralgias and gait problem.   Skin: Negative.    Allergic/Immunologic: Negative.    Neurological:  Positive for weakness.   Hematological:  Bruises/bleeds easily.   Psychiatric/Behavioral:  The patient is nervous/anxious.         Improved   Objective:     Vital Signs (Most Recent):  Temp: 98.1 °F (36.7 °C) (06/24/22 1120)  Pulse: 83 (06/24/22 1555)  Resp: 18 (06/24/22 1555)  BP: 111/69 (06/24/22 1430)  SpO2: (!) 94 % (06/24/22 1555)   Vital Signs (24h Range):  Temp:  [97.1 °F (36.2 °C)-98.8 °F (37.1 °C)] 98.1 °F (36.7 °C)  Pulse:  [72-92] 83  Resp:  [16-26] 18  SpO2:  [93 %-100 %] 94 %  BP: (103-173)/() 111/69     Weight: 47.6 kg (104 lb 15 oz)  Body mass index is 18.01 kg/m².    Intake/Output Summary (Last 24 hours) at 6/24/2022 1903  Last data filed at 6/24/2022 1339  Gross per 24 hour   Intake 120 ml   Output 240 ml   Net -120 ml      Physical Exam  Constitutional:       General: She is not in acute distress.     Appearance: She is ill-appearing.   HENT:      Head: Normocephalic and atraumatic.      Nose: Nose normal.      Mouth/Throat:      Mouth: Mucous membranes are moist.   Eyes:      Extraocular Movements: Extraocular movements intact.      Pupils: Pupils are equal, round, and reactive to light.   Cardiovascular:      Rate and Rhythm: Normal rate and regular rhythm.   Pulmonary:      Effort: Pulmonary  effort is normal.      Comments: Diminished breath sounds  Abdominal:      General: There is distension.      Tenderness: There is no abdominal tenderness. There is no guarding or rebound.   Musculoskeletal:         General: Normal range of motion.      Cervical back: Normal range of motion and neck supple.      Comments: Much diminished muscle mass   Skin:     General: Skin is warm.   Neurological:      Mental Status: She is alert and oriented to person, place, and time.   Psychiatric:      Comments: Much improved mood and affect       Significant Labs: All pertinent labs within the past 24 hours have been reviewed.  Recent Lab Results       None            Significant Imaging: I have reviewed all pertinent imaging results/findings within the past 24 hours.      Assessment/Plan:      * Acute on chronic respiratory failure requiring mechanical ventilation  Intubated in the ED. On 06/19 extubated to BiPAP  P.r.n. ABG and chest x-ray  Pulmonary following    Pt has improved respiratory status but has a poor overall prognosis. She is strongly considering hospice care    Hypokalemia  Potassium 3.3, replaced as per sliding scale to keep greater than 4    QT prolongation  With torsades and VT in the ED  Avoid multiple QTC prolonging medications, checking EKG daily-QTC this morning 495  Keep potassium greater than 4, magnesium greater than 2      Cardiomyopathy  Echo this admission with EF of 25% with grade 1 diastolic dysfunction  Denies any previous known history of heart disease  On 06/18 started on metoprolol, adjust goal-directed therapy as able-with up trending blood pressure will discussed with Cardiology ACE versus Entresto  Will need left heart catheterization/further evaluation as per Cardiology  Monitor for signs of volume overload  Appreciate cardiology input      Hyponatremia  Optimizing nutrition as able and following      Anemia  Chronic, no evidence of active bleeding, monitoring      COPD (chronic  obstructive pulmonary disease)  Respiratory status remains very tenuous and at high risk for re-intubation  Continue scheduled DuoNebs, increase IV steroids 40 q.6  Suspect severe COPD    6/21/2022  Ms Cast notes reduced SOB but continued LAWSON  Her overall prognosis is very poor  I believe that she is stable for transfer to cardio A  Pulmonology input appreciated  Case discussed with Pt and sister  Consider SNF placement    Severe malnutrition  Chronic issue.  There was concern for bowel obstruction, surgery following, KUB this morning with nonobstructive bowel gas pattern  Central line in place, start TPN, consult for PICC line    BALDEMAR (generalized anxiety disorder)  Continue p.r.n. benzodiazepine and monitoring      VT (ventricular tachycardia)  QTC prolongation with torsades and ventricular tachycardia.  No further episodes.  Started on metoprolol.  Continuous cardiac monitoring      VT/torsades cardiac arrest with ROSC  Patient had ROSC, see QT and cardiomyopathy        Smoker  Nicotine patch in place.  Continue to reinforce smoking cessation    Right lung pneumonia, likely aspiration  Respiratory sample with no pathogens but radiological evidence of right lung pneumonia and suspect aspiration  Continue meropenem, adjust as needed      Obstructive sleep apnea syndrome  Has had multiple admissions for hypercapnic respiratory failure as per report  Reportedly has NIP VV at home  Continue BiPAP      Abdominal pain  Acute on chronic  CT is concerning for bowel obstruction with large stool burden, seen by General surgery and given enema on 06/19  KUB 6/20 nonobstructive bowel gas pattern  Trial of clear liquid diet and serial abdominal examination        BMI less than 19,adult  See malnutrition      VTE Risk Mitigation (From admission, onward)         Ordered     enoxaparin injection 40 mg  Daily         06/17/22 2317     Place sequential compression device  Until discontinued         06/17/22 2317     IP VTE HIGH  RISK PATIENT  Once         06/17/22 2317     Reason for no Mechanical VTE Prophylaxis  Once        Question:  Reasons:  Answer:  Physician Provided (leave comment)    06/17/22 2317                Discharge Planning   LIZANDRO: 6/24/2022     Code Status: Full Code   Is the patient medically ready for discharge?:     Reason for patient still in hospital (select all that apply): Treatment, Consult recommendations and Pending disposition  Discharge Plan A: Hospice/home   Discharge Delays: (!) Home Medical Equipment (Insurance, Delivery)              Ben Leung MD  Department of Hospital Medicine   [facility]

## 2022-06-25 NOTE — PT/OT/SLP DISCHARGE
Physical Therapy Discharge Summary    Name: sEtelle Cast  MRN: [id number]   Principal Problem: Chronic respiratory failure     Patient Discharged from acute Physical Therapy on 22.  Please refer to prior PT noted date on 22 for functional status.     Assessment:     Patient has elected hospice care    Objective:     GOALS:   Multidisciplinary Problems     Physical Therapy Goals        Problem: Physical Therapy    Goal Priority Disciplines Outcome Goal Variances Interventions   Physical Therapy Goal     PT, PT/OT Ongoing, Progressing     Description: Goals to be met by: 2022     Patient will increase functional independence with mobility by performin. Supine to sit Independent  2. Sit to stand transfer Independent  3. Bed to chair transfer Independent  4. Gait  x 250 feet Independent                    Multidisciplinary Problems (Resolved)        Problem: Physical Therapy    Goal Priority Disciplines Outcome Goal Variances Interventions   Physical Therapy Goal   (Resolved)     PT, PT/OT Met     Description: Goals to be met by: 2022     Patient will increase functional independence with mobility by performin. Supine to sit with Supervision  2. Sit to stand transfer with Supervision  3. Bed to chair transfer with Supervision using Rolling Walker  4. Gait  x 75 feet with Supervision using Rolling Walker.                          Reasons for Discontinuation of Therapy Services  patient has elected hospice care      Plan:     Patient Discharged to: Palliative Care/Hospice.      2022

## 2022-06-25 NOTE — DISCHARGE SUMMARY
[facility] Medicine  Discharge Summary      Patient Name: Estelle Cast  MRN: [id number]  Patient Class: IP- Inpatient  Admission Date: 6/17/2022  Hospital Length of Stay: 8 days  Discharge Date and Time:  06/25/2022 11:53 AM  Attending Physician: Ben Leung MD   Discharging Provider: Ben Leung MD  Primary Care Provider: Primary Doctor No      HPI:   56-year-old with past medical history significant for COPD, BMI 14.8, depression, generalized anxiety, DANIS, extensive smoking history presenting with worsening shortness breath.  At baseline she uses 3 L it was reported that her oxygen was in the low 80s.  Minimally productive cough.  Chronic diarrhea unchanged from baseline.  No fevers, chills, nausea, vomiting, sick contacts.  No chest pain.  Patient has history of chronic abdominal pain from bowel resection from severe sepsis episode in the past.  On presentation she had been complaining of some upper abdominal pain that was mild.  Family recently relocated from [place].    At time of my evaluation the patient had been intubated for worsening shortness of breath and acute respiratory decompensation with code.  Per ED physician verbal report, patient had gone into torsades and subsequently V-tach.  CPR, epinephrine, amiodarone, shock, and magnesium were given.  Patient responded and is now intubated.  Approximate time of code was less than 10 minutes.  She is currently on Levophed drip.  She is alert and able to give the thumbs-up sign.  My history is limited to report from emergency department.    ED lab significant for WBC 18, Na 134, CO2 48, creatinine 0.3, BNP 60.  Troponins negative.  Lactic acid negative.  Procalcitonin negative.    Chest x-ray significant for tiny interstitial opacity and right upper lobe and right lower lung base.  Lungs are hyperexpanded consistent with emphysema.    In the ED she received a dose of the azithromycin, ceftriaxone, and later meropenem  after she was intubated.  She is also received a dose of Solu-Medrol and DuoNeb treatments.        * No surgery found *      Hospital Course:   Assumed care of this patient on 6/18/22.  Patient recently relocated to [place] to live with sister from [place].  Known history of COPD, chronic respiratory failure on 3 L home oxygen, continued tobacco use.  States in December needed emergent surgery, was taken to [facility], had abdominal resection, since then she is followed by surgery and Gastroenterology, chronic abdominal pain, intermittent worsening, loose stools and as per report seems she was placed on b.i.d. Questran.  Initially presented to the ED due to worsening abdominal pain.  Subsequently with worsening respiratory status, cough productive of greenish phlegm, cardiopulmonary arrest (reported torsades with VT after azithromycin and EKG with QT prolongation), status post ROSC, intubated and transferred to the ICU.  Chest x-ray with concern for right lung pneumonia, likely aspiration.  CT abdomen with concern for large stool burden large bowel and concern for partial colonic obstruction.  Patient is severely malnourished.  On 06/18 remains orally intubated but awake, alert, communicating via writing, copious amounts of phlegm production, denies any abdominal pain, states she is not passing flatus, no bowel movements today.  On 06/19, EKG with continued QTC prolongation, echo now with EF of 25% with grade 1 diastolic dysfunction, started on metoprolol, cardiology following.  Plan is attempted extubation to BiPAP.  PICC line when able to start TPN.    6/21/2022  Ms Cast is sitting up and feeding herself in a chair. She notes improved SOB but continued GONZALEZ. I want to have s decent life with the time I have left.     6/22/2022  Pt states that she is feeling better than yesterday with reduced sob, gonzalez and orthopnea. She has decided to be a hospice patient at home.    6/23/2022  Ms Cast has is  depressed and I have spoken to her Re DNR status and no indication at this point for a life vest    6/24/2022  Ms Cast feels better on zoloft. She has an improved appetite and mobility with reduced LAWSON. Pt unable to receive a trilogy vent at present.     6/25/2022  Pt is feeling much better and would like to go home on palliative care. Her BIPAP will be set up a 1 PM and she has 3 liters HOME 0 2  ROS: LAWSON otherwise negative X 9  PE in no distress HEENT MAC EOM intact moist mucus membranes Neck supple no use of accessory muscles Lungs no crackles wheezes or rhonchi Heart S 1 S 2 RRR no murmur Abdomen BS+ nontender Ext without CC or E pulses 1-2+ much diminished muscle mass Skin no acute finding Neuro no acute sensory or motor deficit           Goals of Care Treatment Preferences:  Code Status: Full Code      Consults:   Consults (From admission, onward)        Status Ordering Provider     Inpatient consult to Social Work/Case Management  Once        Provider:  (Not yet assigned)    Acknowledged KAITLIN HERNANDEZ     Inpatient consult to   Once        Provider:  (Not yet assigned)    Completed FELIPE CADENA     Inpatient consult to Social Work/Case Management  Once        Provider:  (Not yet assigned)    Acknowledged MARTÍNEZ AARON     Inpatient consult to   Once        Provider:  (Not yet assigned)    Acknowledged FELIPE CADENA     Inpatient consult to PICC Line Nurse  Once        Provider:  (Not yet assigned)    Acknowledged FELIPE CADENA     Inpatient consult to Registered Dietitian/Nutritionist  Once        Provider:  (Not yet assigned)    Completed KAITLIN HERNANDEZ     Inpatient consult to Cardiology  Once        Provider:  Albino Morales MD    Completed KASSIDY NOBLES     Inpatient consult to General Surgery  Once        Provider:  Bill Graf MD    Completed KASSIDY NOBLES     Inpatient consult to Pulmonology  Once        Provider:  (Not yet  assigned)    Completed JUAN M FIGUEREDO     Inpatient consult to Registered Dietitian/Nutritionist  Once        Provider:  (Not yet assigned)    Completed JUAN M FIGUEREDO     Inpatient consult to Hospitalist  Once        Provider:  Juan M Figueredo MD    Acknowledged FELIPE CADENA     Inpatient consult to Pulmonology  Once        Provider:  Lizzie Negrete MD    Completed JUAN M FIGUEREDO     Inpatient consult to Anesthesiology  Once        Provider:  Deni Funk MD    Acknowledged FELIPE CADENA     Inpatient consult to Pulmonology  Once        Provider:  Lizzie Negrete MD    Completed NOMAN NEWTON     Inpatient consult to Hospitalist  Once        Provider:  Juan M Figueredo MD    Acknowledged FELIPE CADENA          No new Assessment & Plan notes have been filed under this hospital service since the last note was generated.  Service: Hospital Medicine    Final Active Diagnoses:    Diagnosis Date Noted POA    PRINCIPAL PROBLEM:  Acute on chronic respiratory failure requiring mechanical ventilation [J96.10] 06/17/2022 Yes    Hypokalemia [E87.6] 06/20/2022 No    Acute hypercapnic respiratory failure [J96.02]  Yes    Cardiomyopathy [I42.9] 06/19/2022 Yes     Chronic    QT prolongation [R94.31] 06/19/2022 Yes     Chronic    VT (ventricular tachycardia) [I47.2] 06/18/2022 Yes    BALDEMAR (generalized anxiety disorder) [F41.1] 06/18/2022 Yes     Chronic    Severe malnutrition [E43] 06/18/2022 Yes     Chronic    COPD (chronic obstructive pulmonary disease) [J44.9] 06/18/2022 Yes     Chronic    Anemia [D64.9] 06/18/2022 Yes     Chronic    Hyponatremia [E87.1] 06/18/2022 No    VT/torsades cardiac arrest with ROSC [I46.9] 06/17/2022 Yes    Right lung pneumonia, likely aspiration [J18.9] 05/23/2022 No    Smoker [F17.200] 05/23/2022 Yes    Abdominal pain [R10.9] 03/07/2022 Yes    BMI less than 19,adult [Z68.1] 12/15/2021 Not Applicable    Obstructive sleep apnea syndrome [G47.33] 10/01/2017 Yes     "  Problems Resolved During this Admission:    Diagnosis Date Noted Date Resolved POA    Possible bowel obstruction  [K56.609] 06/19/2022 06/20/2022 Yes    Leucocytosis [D72.829] 06/18/2022 06/19/2022 Yes       Discharged Condition: good    Disposition:     Follow Up:   Follow-up Information     Primary Doctor Yessica .           [facility] Follow up.    Specialties: Hospice and Palliative Medicine, Hospice Services, Home Health Services  Contact information:  [facility] [address]  [facility] [id number] [phone]                       Patient Instructions:      NIPPV FOR HOME USE   Order Comments: NIPPV settings:                 PSmax - 25, range 15-25              PEEP range 5-10              RR - 16              TV goal - 300 - 400 ml              O2 to keep sats 88-94%     Order Specific Question Answer Comments   Height: 5' 4" (1.626 m)    Weight: 47.6 kg (104 lb 15 oz)    Does patient have medical equipment at home? oxygen    Length of need (1-99 months): 99        Significant Diagnostic Studies: Labs:   BMP:   Recent Labs   Lab 06/24/22  1924 06/25/22  0407   GLU 93  93 84   *  134* 135*   K 3.7  3.7 4.1   CL 83*  83* 85*   CO2 46*  46* 45*   BUN 10  10 11   CREATININE 0.4*  0.4* 0.4*   CALCIUM 8.0*  8.0* 8.0*   MG 1.4*  1.4* 2.0   , CMP   Recent Labs   Lab 06/24/22 1924 06/25/22  0407   *  134* 135*   K 3.7  3.7 4.1   CL 83*  83* 85*   CO2 46*  46* 45*   GLU 93  93 84   BUN 10  10 11   CREATININE 0.4*  0.4* 0.4*   CALCIUM 8.0*  8.0* 8.0*   PROT 5.7*  5.7* 5.5*   ALBUMIN 3.0*  3.0* 3.0*   BILITOT 0.3  0.3 0.3   ALKPHOS 50*  50* 49*   AST 15  15 14   ALT 16  16 15   ANIONGAP 5*  5* 5*   ESTGFRAFRICA >60.0  >60.0 >60.0   EGFRNONAA >60.0  >60.0 >60.0   , CBC   Recent Labs   Lab 06/24/22  1924 06/25/22  0407   WBC 8.53  8.53 9.90   HGB 9.4*  9.4* 9.6*   HCT 31.3*  31.3* 31.2*     287 324   , INR   Lab Results   Component Value Date    INR 1.0 " 06/19/2022   , Troponin   Recent Labs   Lab 06/21/22  0456   TROPONINI <0.030    and All labs within the past 24 hours have been reviewed  Microbiology:   Blood Culture   Lab Results   Component Value Date    LABBLOO No growth after 5 days. 06/17/2022    and Urine Culture  No results found for: LABURIN    Pending Diagnostic Studies:     Procedure Component Value Units Date/Time    Vitamin B6 [[id number]] Collected: 06/19/22 0514    Order Status: Sent Lab Status: In process Updated: 06/19/22 0514    Specimen: Blood          Medications:  Reconciled Home Medications:      Medication List      START taking these medications    predniSONE 10 MG tablet  Commonly known as: DELTASONE  Take 1 tablet (10 mg total) by mouth once daily. Hard script given for a 12 day taper for 12 days        CONTINUE taking these medications    albuterol 0.63 mg/3 mL Nebu  Commonly known as: ACCUNEB  Take 0.63 mg by nebulization every 6 (six) hours as needed. Rescue     ALPRAZolam 0.25 MG tablet  Commonly known as: XANAX  Take by mouth 3 (three) times daily.     ARNUITY ELLIPTA 200 mcg/actuation inhaler  Generic drug: fluticasone furoate  Inhale 200 mcg into the lungs. Controller     azelastine 137 mcg (0.1 %) nasal spray  Commonly known as: ASTELIN  1 spray by Nasal route 2 (two) times daily.     DUONEB INHL  Inhale into the lungs.     guaiFENesin 600 mg 12 hr tablet  Commonly known as: MUCINEX  Take 1,200 mg by mouth once daily.     ipratropium 17 mcg/actuation inhaler  Commonly known as: ATROVENT HFA  Inhale 2 puffs into the lungs every 6 (six) hours. Rescue     latanoprost (PF) 0.005 % Drop  Apply to eye.     neomycin-polymyxin-hydrocortisone 3.5-10,000-10 mg-unit-mg/mL ophthalmic suspension  Commonly known as: CORTISPORIN  1 drop every 4 (four) hours.     nicotine 14 mg/24 hr  Commonly known as: NICODERM CQ  Place 1 patch onto the skin every 24 hours.     pantoprazole 40 MG tablet  Commonly known as: PROTONIX  Take 40 mg by mouth once  daily.        STOP taking these medications    acetaminophen 500 MG tablet  Commonly known as: TYLENOL        ASK your doctor about these medications    azithromycin 250 MG tablet  Commonly known as: Z-CAMI  Take 500 mg by mouth once daily.            Indwelling Lines/Drains at time of discharge:   Lines/Drains/Airways     Peripherally Inserted Central Catheter Line  Duration           PICC Double Lumen 06/20/22 1110 left basilic 5 days                Time spent on the discharge of patient: 38 minutes         Ben Leung MD  Department of Hospital Medicine  [facility]

## 2022-07-02 LAB — VIT B6 SERPL-MCNC: 2.6 UG/L (ref 3.4–65.2)

## 2023-05-24 NOTE — PLAN OF CARE
[facility] expedited appeal pending at this time. Will follow for determination. Received call from [facility] stating pending order/settings for NIPPV. Informed will f/u with MD  Received CM consult for NIPPV, however will need settings. Sent message to Dr Cano via secured chat for NIPPV settings.pending response    F/U with Dr Perez re setting for NIPPV and he is pending information from Dr Cano.    Called and left message for [facility] to provide status on NIPPV settings.     As per Dr Perez during rounds, pt may not need Life Vest.      06/23/22 0915   Post-Acute Status   Post-Acute Authorization Hospice;HME   Discharge Delays None known at this time   Discharge Plan   Discharge Plan A Hospice/home   Discharge Plan B Hospice/home